# Patient Record
Sex: FEMALE | Race: WHITE | NOT HISPANIC OR LATINO | Employment: OTHER | URBAN - NONMETROPOLITAN AREA
[De-identification: names, ages, dates, MRNs, and addresses within clinical notes are randomized per-mention and may not be internally consistent; named-entity substitution may affect disease eponyms.]

---

## 2017-12-04 RX ORDER — RANITIDINE 150 MG/1
150 TABLET ORAL 2 TIMES DAILY
COMMUNITY
End: 2017-12-11 | Stop reason: HOSPADM

## 2017-12-05 ENCOUNTER — HOSPITAL ENCOUNTER (OUTPATIENT)
Dept: MRI IMAGING | Facility: HOSPITAL | Age: 77
Discharge: HOME | End: 2017-12-05
Admitting: INTERNAL MEDICINE

## 2017-12-05 DIAGNOSIS — R10.11 ABDOMINAL PAIN, RIGHT UPPER QUADRANT: ICD-10-CM

## 2017-12-05 PROCEDURE — 74181 MRI ABDOMEN W/O CONTRAST: CPT

## 2017-12-11 ENCOUNTER — HOSPITAL ENCOUNTER (OUTPATIENT)
Facility: HOSPITAL | Age: 77
Setting detail: HOSPITAL OUTPATIENT SURGERY
Discharge: HOME | End: 2017-12-11
Attending: INTERNAL MEDICINE | Admitting: INTERNAL MEDICINE

## 2017-12-11 ENCOUNTER — ANESTHESIA EVENT (OUTPATIENT)
Dept: GASTROENTEROLOGY | Facility: HOSPITAL | Age: 77
End: 2017-12-11

## 2017-12-11 ENCOUNTER — ANESTHESIA (OUTPATIENT)
Dept: GASTROENTEROLOGY | Facility: HOSPITAL | Age: 77
End: 2017-12-11

## 2017-12-11 VITALS
WEIGHT: 178.5 LBS | SYSTOLIC BLOOD PRESSURE: 195 MMHG | BODY MASS INDEX: 31.63 KG/M2 | RESPIRATION RATE: 18 BRPM | HEART RATE: 69 BPM | TEMPERATURE: 96.7 F | HEIGHT: 63 IN | OXYGEN SATURATION: 96 % | DIASTOLIC BLOOD PRESSURE: 78 MMHG

## 2017-12-11 DIAGNOSIS — K27.9: ICD-10-CM

## 2017-12-11 DIAGNOSIS — Z86.010 HISTORY OF COLONIC POLYPS: ICD-10-CM

## 2017-12-11 PROCEDURE — 88305 TISSUE EXAM BY PATHOLOGIST: CPT | Performed by: INTERNAL MEDICINE

## 2017-12-11 PROCEDURE — 25010000002 PROPOFOL 10 MG/ML EMULSION: Performed by: NURSE ANESTHETIST, CERTIFIED REGISTERED

## 2017-12-11 PROCEDURE — 25010000002 MIDAZOLAM PER 1 MG: Performed by: NURSE ANESTHETIST, CERTIFIED REGISTERED

## 2017-12-11 PROCEDURE — 88305 TISSUE EXAM BY PATHOLOGIST: CPT | Performed by: PATHOLOGY

## 2017-12-11 RX ORDER — PROPOFOL 10 MG/ML
VIAL (ML) INTRAVENOUS AS NEEDED
Status: DISCONTINUED | OUTPATIENT
Start: 2017-12-11 | End: 2017-12-11 | Stop reason: SURG

## 2017-12-11 RX ORDER — MIDAZOLAM HYDROCHLORIDE 1 MG/ML
INJECTION INTRAMUSCULAR; INTRAVENOUS AS NEEDED
Status: DISCONTINUED | OUTPATIENT
Start: 2017-12-11 | End: 2017-12-11 | Stop reason: SURG

## 2017-12-11 RX ORDER — LIDOCAINE HYDROCHLORIDE 10 MG/ML
INJECTION, SOLUTION INFILTRATION; PERINEURAL AS NEEDED
Status: DISCONTINUED | OUTPATIENT
Start: 2017-12-11 | End: 2017-12-11 | Stop reason: SURG

## 2017-12-11 RX ORDER — PROMETHAZINE HYDROCHLORIDE 25 MG/ML
12.5 INJECTION, SOLUTION INTRAMUSCULAR; INTRAVENOUS ONCE AS NEEDED
Status: DISCONTINUED | OUTPATIENT
Start: 2017-12-11 | End: 2017-12-11 | Stop reason: HOSPADM

## 2017-12-11 RX ORDER — PROMETHAZINE HYDROCHLORIDE 25 MG/1
25 SUPPOSITORY RECTAL ONCE AS NEEDED
Status: DISCONTINUED | OUTPATIENT
Start: 2017-12-11 | End: 2017-12-11 | Stop reason: HOSPADM

## 2017-12-11 RX ORDER — DEXTROSE AND SODIUM CHLORIDE 5; .45 G/100ML; G/100ML
20 INJECTION, SOLUTION INTRAVENOUS CONTINUOUS
Status: DISCONTINUED | OUTPATIENT
Start: 2017-12-11 | End: 2017-12-11 | Stop reason: HOSPADM

## 2017-12-11 RX ORDER — DEXAMETHASONE SODIUM PHOSPHATE 4 MG/ML
8 INJECTION, SOLUTION INTRA-ARTICULAR; INTRALESIONAL; INTRAMUSCULAR; INTRAVENOUS; SOFT TISSUE ONCE AS NEEDED
Status: DISCONTINUED | OUTPATIENT
Start: 2017-12-11 | End: 2017-12-11 | Stop reason: HOSPADM

## 2017-12-11 RX ORDER — ONDANSETRON 2 MG/ML
4 INJECTION INTRAMUSCULAR; INTRAVENOUS ONCE AS NEEDED
Status: DISCONTINUED | OUTPATIENT
Start: 2017-12-11 | End: 2017-12-11 | Stop reason: HOSPADM

## 2017-12-11 RX ORDER — PROMETHAZINE HYDROCHLORIDE 25 MG/1
25 TABLET ORAL ONCE AS NEEDED
Status: DISCONTINUED | OUTPATIENT
Start: 2017-12-11 | End: 2017-12-11 | Stop reason: HOSPADM

## 2017-12-11 RX ADMIN — MIDAZOLAM 2 MG: 1 INJECTION INTRAMUSCULAR; INTRAVENOUS at 16:45

## 2017-12-11 RX ADMIN — DEXTROSE AND SODIUM CHLORIDE 20 ML/HR: 5; 450 INJECTION, SOLUTION INTRAVENOUS at 16:09

## 2017-12-11 RX ADMIN — LIDOCAINE HYDROCHLORIDE 50 MG: 10 INJECTION, SOLUTION INFILTRATION; PERINEURAL at 16:45

## 2017-12-11 RX ADMIN — PROPOFOL 100 MG: 10 INJECTION, EMULSION INTRAVENOUS at 16:45

## 2017-12-11 RX ADMIN — PROPOFOL 100 MG: 10 INJECTION, EMULSION INTRAVENOUS at 16:50

## 2017-12-11 NOTE — H&P
Kylee Garcia DO,[facility]  Gastroenterology  Hepatology  Endoscopy  Board Certified in Internal Medicine and gastroenterology  [address], [address]  [place]. [address]  - [phone]   F - [phone]     GASTROENTEROLOGY HISTORY AND PHYSICAL  NOTE   KYLEE GARCIA DO.         SUBJECTIVE:   12/11/2017    Name: Hallie Milan  DOD: 1940        Chief Complaint:       Subjective : Upper abdominal pain with personal history of colon polyps and a family history of colon cancer    Patient is 77 y.o. female presents with for elective EGD and colonoscopy.      ROS/HISTORY/ CURRENT MEDICATIONS/OBJECTIVE/VS/PE:   Review of Systems:   Review of Systems    History:     Past Medical History:   Diagnosis Date   • Allergic rhinitis    • Benign paroxysmal positional vertigo    • Chronic laryngitis    • Ear problem     ear dysfunction   • GERD (gastroesophageal reflux disease)    • Kidney stone     Hx of   • Rapid heart beat    • Temporomandibular joint disorder    • Vertigo      Past Surgical History:   Procedure Laterality Date   • CHOLECYSTECTOMY     • MOUTH SURGERY      Due to staph infection in lower lip     Family History   Problem Relation Age of Onset   • Cancer Other    • Diabetes Other    • Heart disease Other    • Hypertension Other    • Stroke Other    • Thyroid disease Other    • Other Other      Colon Problems     Social History   Substance Use Topics   • Smoking status: Never Smoker   • Smokeless tobacco: Never Used   • Alcohol use No     Prescriptions Prior to Admission   Medication Sig Dispense Refill Last Dose   • albuterol (PROVENTIL HFA;VENTOLIN HFA) 108 (90 BASE) MCG/ACT inhaler Inhale 1 puff Every 4 (Four) Hours As Needed for wheezing.   Past Week at Unknown time   • B COMPLEX VITAMINS PO Take 1,000 mcg by mouth 1 (One) Time Per Week.   Past Week at Unknown time   • nebivolol (BYSTOLIC) 5 MG tablet Take 5 mg by mouth Daily.   12/10/2017 at Unknown time   • raNITIdine (ZANTAC) 150  MG tablet Take 150 mg by mouth 2 (Two) Times a Day.   12/10/2017 at Unknown time   • beclomethasone (QVAR) 40 MCG/ACT inhaler Inhale 2 puffs 2 (Two) Times a Day.   12/8/2017   • MAGNESIUM GLUCONATE PO Take 250 mg by mouth Daily.   12/8/2017   • meclizine (ANTIVERT) 25 MG tablet Take 12.5-25 mg by mouth 3 (Three) Times a Day As Needed for dizziness.   12/4/2017   • Omega-3 Fatty Acids (FISH OIL) 500 MG capsule Take 900 mg by mouth 2 (Two) Times a Day.   12/7/2017     Allergies:  Codeine; Minoxidil; Claritin-d 12 hour [loratadine-pseudoephedrine er]; and Prednisone    I have reviewed the patients medical history, surgical history and family history in the available medical record system.     Current Medications:     Current Facility-Administered Medications   Medication Dose Route Frequency Provider Last Rate Last Dose   • dextrose 5 % and sodium chloride 0.45 % infusion  20 mL/hr Intravenous Continuous Curtis Rick,  20 mL/hr at 12/11/17 1609 20 mL/hr at 12/11/17 1609       Objective     Physical Exam:   Temp:  [96.9 °F (36.1 °C)] 96.9 °F (36.1 °C)  Heart Rate:  [76] 76  Resp:  [19] 19    Physical Exam:  General Appearance:    Alert, cooperative, in no acute distress   Head:    Normocephalic, without obvious abnormality, atraumatic   Eyes:            Lids and lashes normal, conjunctivae and sclerae normal, no   icterus, no pallor, corneas clear, PERRLA   Ears:    Ears appear intact with no abnormalities noted   Throat:   No oral lesions, no thrush, oral mucosa moist   Neck:   No adenopathy, supple, trachea midline, no thyromegaly, no     carotid bruit, no JVD   Back:     No kyphosis present, no scoliosis present, no skin lesions,       erythema or scars, no tenderness to percussion or                   palpation,   range of motion normal   Lungs:     Clear to auscultation,respirations regular, even and                   unlabored    Heart:    Regular rhythm and normal rate, normal S1 and S2, no             murmur, no gallop, no rub, no click   Breast Exam:    Deferred   Abdomen:     Normal bowel sounds, no masses, no organomegaly, soft        non-tender, non-distended, no guarding, no rebound                 tenderness   Genitalia:    Deferred   Extremities:   Moves all extremities well, no edema, no cyanosis, no              redness   Pulses:   Pulses palpable and equal bilaterally   Skin:   No bleeding, bruising or rash   Lymph nodes:   No palpable adenopathy   Neurologic:   Cranial nerves 2 - 12 grossly intact, sensation intact, DTR        present and equal bilaterally      Results Review:     Lab Results   Component Value Date    WBC 10.1 (H) 10/10/2016    WBC 11.1 (H) 10/09/2016    HGB 12.1 10/10/2016    HGB 13.4 10/09/2016    HCT 36.2 10/10/2016    HCT 40.1 10/09/2016     10/10/2016     10/09/2016             No results found for: LIPASE  Lab Results   Component Value Date    INR 1.0 10/10/2016    INR 1.0 10/09/2016          Radiology Review:  Imaging Results (last 72 hours)     ** No results found for the last 72 hours. **           I reviewed the patient's new clinical results.  I reviewed the patient's new imaging results and agree with the interpretation.     ASSESSMENT/PLAN:   ASSESSMENT:   1.  Dyspepsia with no evidence of any improvement with typical medical therapy  2.  Family history of colon cancer  3.  Personal history of colon polyps    PLAN:   1.  Esophagogastroduodenoscopy with biopsies  2.  Colonoscopy    Risk and benefits associated with the procedure are reviewed with the patient.  The patient wishes to proceed      Curtis Rick DO  12/11/17  4:34 PM

## 2017-12-11 NOTE — PLAN OF CARE
Problem: Patient Care Overview (Adult)  Goal: Plan of Care Review    12/11/17 1705   Coping/Psychosocial Response Interventions   Plan Of Care Reviewed With patient   Patient Care Overview   Progress no change   Outcome Evaluation   Outcome Summary/Follow up Plan vss         Problem: GI Endoscopy (Adult)  Goal: Signs and Symptoms of Listed Potential Problems Will be Absent or Manageable (GI Endoscopy)    12/11/17 1705   GI Endoscopy   Problems Assessed (GI Endoscopy) all   Problems Present (GI Endoscopy) none

## 2017-12-11 NOTE — PLAN OF CARE
Problem: Patient Care Overview (Adult)  Goal: Plan of Care Review  Outcome: Outcome(s) achieved Date Met:  12/11/17 12/11/17 1718   Coping/Psychosocial Response Interventions   Plan Of Care Reviewed With patient   Patient Care Overview   Progress no change   Outcome Evaluation   Outcome Summary/Follow up Plan vss         Problem: GI Endoscopy (Adult)  Goal: Signs and Symptoms of Listed Potential Problems Will be Absent or Manageable (GI Endoscopy)  Outcome: Outcome(s) achieved Date Met:  12/11/17 12/11/17 1718   GI Endoscopy   Problems Assessed (GI Endoscopy) all   Problems Present (GI Endoscopy) none

## 2017-12-11 NOTE — ANESTHESIA POSTPROCEDURE EVALUATION
Patient: Hallie Milan    Procedure Summary     Date Anesthesia Start Anesthesia Stop Room / Location    12/11/17 3995 0086 [facility] ENDOSCOPY 2 / [facility] ENDOSCOPY       Procedure Diagnosis Surgeon Provider    ESOPHAGOGASTRODUODENOSCOPY (N/A Esophagus); COLONOSCOPY (N/A ) Active peptic ulcer; History of colonic polyps  (Active peptic ulcer [K27.9]; History of colonic polyps [Z86.010]) Curtis Rick, DO Terrell Boone CRNA          Anesthesia Type: MAC  Last vitals  BP       Temp   96.9 °F (36.1 °C) (12/11/17 1557)   Pulse   76 (12/11/17 1557)   Resp   19 (12/11/17 1557)     SpO2   98 % (12/11/17 1557)     Post Anesthesia Care and Evaluation    Patient location during evaluation: PACU  Patient participation: complete - patient participated  Level of consciousness: awake  Pain score: 1  Pain management: adequate  Airway patency: patent  Anesthetic complications: No anesthetic complications  PONV Status: none  Cardiovascular status: acceptable  Respiratory status: acceptable  Hydration status: acceptable

## 2017-12-11 NOTE — ANESTHESIA PREPROCEDURE EVALUATION
Anesthesia Evaluation     no history of anesthetic complications:  NPO Solid Status: > 8 hours  NPO Liquid Status: > 2 hours     Airway   Mallampati: II  TM distance: >3 FB  Neck ROM: full  Dental - normal exam     Comment: Lower partial left at home    Pulmonary - normal exam   (+) asthma,   Cardiovascular - normal exam    (+) dysrhythmias Tachycardia,   (-) hypertension    ROS comment: Elevated BP due to pain    Neuro/Psych  (+) dizziness/light headedness,    GI/Hepatic/Renal/Endo    (+)  GERD,     Musculoskeletal     Abdominal    Substance History      OB/GYN          Other                                        Anesthesia Plan    ASA 2     MAC     intravenous induction   Anesthetic plan and risks discussed with patient.

## 2017-12-13 LAB
LAB AP CASE REPORT: NORMAL
Lab: NORMAL
PATH REPORT.FINAL DX SPEC: NORMAL
PATH REPORT.GROSS SPEC: NORMAL

## 2018-01-29 ENCOUNTER — OFFICE VISIT (OUTPATIENT)
Dept: CARDIAC SURGERY | Facility: CLINIC | Age: 78
End: 2018-01-29

## 2018-01-29 VITALS
OXYGEN SATURATION: 96 % | SYSTOLIC BLOOD PRESSURE: 128 MMHG | HEIGHT: 63 IN | WEIGHT: 177 LBS | BODY MASS INDEX: 31.36 KG/M2 | HEART RATE: 75 BPM | DIASTOLIC BLOOD PRESSURE: 72 MMHG

## 2018-01-29 DIAGNOSIS — K27.9 PUD (PEPTIC ULCER DISEASE): ICD-10-CM

## 2018-01-29 DIAGNOSIS — H81.10 BENIGN PAROXYSMAL POSITIONAL VERTIGO, UNSPECIFIED LATERALITY: ICD-10-CM

## 2018-01-29 DIAGNOSIS — K21.9 GASTROESOPHAGEAL REFLUX DISEASE WITHOUT ESOPHAGITIS: ICD-10-CM

## 2018-01-29 DIAGNOSIS — I10 BENIGN ESSENTIAL HTN: ICD-10-CM

## 2018-01-29 DIAGNOSIS — E66.09 CLASS 1 OBESITY DUE TO EXCESS CALORIES WITH SERIOUS COMORBIDITY AND BODY MASS INDEX (BMI) OF 31.0 TO 31.9 IN ADULT: ICD-10-CM

## 2018-01-29 DIAGNOSIS — Q79.0 MORGAGNI HERNIA: Primary | ICD-10-CM

## 2018-01-29 PROCEDURE — 99204 OFFICE O/P NEW MOD 45 MIN: CPT | Performed by: THORACIC SURGERY (CARDIOTHORACIC VASCULAR SURGERY)

## 2018-01-29 RX ORDER — SUCRALFATE 1 G/1
1 TABLET ORAL 4 TIMES DAILY
COMMUNITY
End: 2018-02-08 | Stop reason: HOSPADM

## 2018-01-29 RX ORDER — PANTOPRAZOLE SODIUM 40 MG/1
40 TABLET, DELAYED RELEASE ORAL
COMMUNITY
End: 2022-03-25

## 2018-02-05 PROBLEM — K27.9 PUD (PEPTIC ULCER DISEASE): Status: ACTIVE | Noted: 2018-02-05

## 2018-02-05 PROBLEM — K21.9 GERD (GASTROESOPHAGEAL REFLUX DISEASE): Status: ACTIVE | Noted: 2018-02-05

## 2018-02-05 PROBLEM — H81.10 BENIGN PAROXYSMAL POSITIONAL VERTIGO: Status: ACTIVE | Noted: 2018-02-05

## 2018-02-05 PROBLEM — E66.09 CLASS 1 OBESITY DUE TO EXCESS CALORIES WITH SERIOUS COMORBIDITY AND BODY MASS INDEX (BMI) OF 31.0 TO 31.9 IN ADULT: Status: ACTIVE | Noted: 2018-02-05

## 2018-02-05 PROBLEM — Q79.0 MORGAGNI HERNIA: Status: ACTIVE | Noted: 2018-02-05

## 2018-02-05 PROBLEM — I10 BENIGN ESSENTIAL HTN: Status: ACTIVE | Noted: 2018-02-05

## 2018-02-05 NOTE — PROGRESS NOTES
1/29/2018    Hallie Milan  1940    Chief Complaint:    Chief Complaint   Patient presents with   • diaphragmatic hernia       HPI:      PCP:  Manolo Galarza MD  Cardiology:  Dr Matias  GI:  Dr Rick     77 y.o. female with Peptic Ulcer disease(uncontrolled, increased risk ulcer complications), Morgagni diaphragmatic hernia(new, uncontrolled), HTN(stable, increased risk stroke), Obesity(uncontrolled, BMI 32, increased risk cardiovascular events), .  never smoked.  Moderate stomach pains x years.  Currently being treated for active duodenal ulcer disease.  No TIA stroke amaurosis.  No MI claudication. No other associated signs, symptoms or modifying factors.    10/2016 CT Abdomen Pelvis:  Distended gallbladder, diverticulosis.  Small Morgagni hernia containing fat.  12/2017 EGD:  Gastritis, small duodenal ulcers x 10, max 6mm.  12/2017 Colonoscopy:  Diverticulosis, internal hemorrhoids.  12/2017 MRI Abdomen MRCP:  No biliary, pancreatic ductal dilation.  No choledocholithiasis.  Small Morgagni hernia containing fat.    10/2016 ECG:  NSR 66, QTc 442    The following portions of the patient's history were reviewed and updated as appropriate: allergies, current medications, past family history, past medical history, past social history, past surgical history and problem list.  Recent images independently reviewed.  Available laboratory values reviewed.    PMH:  Past Medical History:   Diagnosis Date   • Allergic rhinitis    • Asthma    • Benign paroxysmal positional vertigo    • Chronic laryngitis    • Ear problem     ear dysfunction   • GERD (gastroesophageal reflux disease)    • Kidney stone     Hx of   • Morgagni hernia    • Rapid heart beat    • Temporomandibular joint disorder    • Vertigo        ALLERGIES:  Allergies   Allergen Reactions   • Codeine Other (See Comments)     oversedation   • Minoxidil      Made her heart feel funny    • Claritin-D 12 Hour [Loratadine-Pseudoephedrine Er] Anxiety and  Palpitations   • Prednisone Anxiety and Palpitations         MEDICATIONS:    Current Outpatient Prescriptions:   •  albuterol (PROVENTIL HFA;VENTOLIN HFA) 108 (90 BASE) MCG/ACT inhaler, Inhale 1 puff Every 4 (Four) Hours As Needed for wheezing., Disp: , Rfl:   •  B COMPLEX VITAMINS PO, Take 1,000 mcg by mouth 1 (One) Time Per Week., Disp: , Rfl:   •  beclomethasone (QVAR) 40 MCG/ACT inhaler, Inhale 2 puffs 2 (Two) Times a Day., Disp: , Rfl:   •  MAGNESIUM GLUCONATE PO, Take 250 mg by mouth Daily., Disp: , Rfl:   •  meclizine (ANTIVERT) 25 MG tablet, Take 12.5-25 mg by mouth 3 (Three) Times a Day As Needed for dizziness., Disp: , Rfl:   •  nebivolol (BYSTOLIC) 5 MG tablet, Take 5 mg by mouth Daily., Disp: , Rfl:   •  Omega-3 Fatty Acids (FISH OIL) 500 MG capsule, Take 900 mg by mouth 2 (Two) Times a Day., Disp: , Rfl:   •  pantoprazole (PROTONIX) 40 MG EC tablet, Take 40 mg by mouth 2 (Two) Times a Day., Disp: , Rfl:   •  sucralfate (CARAFATE) 1 g tablet, Take 1 g by mouth 4 (Four) Times a Day., Disp: , Rfl:     Review of Systems   Review of Systems   Constitution: Negative for malaise/fatigue, night sweats and weight loss.   HENT: Positive for hoarse voice and sore throat. Negative for hearing loss and stridor.    Eyes: Negative for vision loss in left eye, vision loss in right eye and visual disturbance.   Cardiovascular: Negative for chest pain, leg swelling and palpitations.   Respiratory: Positive for wheezing. Negative for cough, hemoptysis and shortness of breath.    Hematologic/Lymphatic: Negative for adenopathy and bleeding problem. Does not bruise/bleed easily.   Skin: Negative for color change, poor wound healing and rash.   Musculoskeletal: Negative for arthritis, back pain, muscle weakness and neck pain.   Gastrointestinal: Positive for abdominal pain and heartburn. Negative for dysphagia.   Neurological: Positive for dizziness. Negative for numbness and seizures.   Psychiatric/Behavioral: Negative for  altered mental status, depression and memory loss. The patient is not nervous/anxious.        Physical Exam   Physical Exam   Constitutional: She is oriented to person, place, and time. She is active and cooperative. She does not appear ill. No distress.   HENT:   Head: Atraumatic.   Right Ear: Hearing normal.   Left Ear: Hearing normal.   Nose: No nasal deformity. No epistaxis.   Mouth/Throat: She does not have dentures. Normal dentition.   Eyes: Conjunctivae and lids are normal. Right pupil is round and reactive. Left pupil is round and reactive.   Neck: No JVD present. Carotid bruit is not present. No tracheal deviation present. No thyroid mass and no thyromegaly present.   Cardiovascular: Normal rate and regular rhythm.    No murmur heard.  Pulses:       Carotid pulses are 2+ on the right side, and 2+ on the left side.       Radial pulses are 2+ on the right side, and 2+ on the left side.        Dorsalis pedis pulses are 2+ on the right side, and 2+ on the left side.   Pulmonary/Chest: Effort normal and breath sounds normal.   Abdominal: Soft. She exhibits no distension and no mass. There is no splenomegaly or hepatomegaly. There is no tenderness.   Musculoskeletal: She exhibits no deformity.   Gait normal.    Lymphadenopathy:     She has no cervical adenopathy.        Right: No supraclavicular adenopathy present.        Left: No supraclavicular adenopathy present.   Neurological: She is alert and oriented to person, place, and time. She has normal strength.   Skin: Skin is warm and dry. No cyanosis or erythema. No pallor.   No venous staining   Psychiatric: She has a normal mood and affect. Her speech is normal. Judgment and thought content normal.     Results for HALLIE HENDERSON (MRN [id number]) as of 2/5/2018 09:07   Ref. Range 10/10/2016 02:30   Creatinine Latest Ref Range: 0.5 - 1.0 mg/dl 0.7   BUN Latest Ref Range: 7 - 21 mg/dl 17     ASSESSMENT:  Hallie was seen today for diaphragmatic  hernia.    Diagnoses and all orders for this visit:    Morgagni hernia    Benign paroxysmal positional vertigo, unspecified laterality    Gastroesophageal reflux disease without esophagitis    Class 1 obesity due to excess calories with serious comorbidity and body mass index (BMI) of 31.0 to 31.9 in adult    Benign essential HTN    PUD (peptic ulcer disease)    PLAN:  Detailed discussion with Hallie Milan regarding situation and options.  Congenital defect.  Foramen Morgagni hernia containing small amount fat/omentum, doubt symptomatic.  No change in appearance from 2016 imaging.  Current treatment to active duodenal ulcer disease.  Can re-evaluate if symptoms persist after ulcer disease resolved.  Multiple risk factors with severe comorbidities.  No intervention indicated at this time.  Risks, benefits discussed.  Understands and wishes to proceed with plan.    Return as needed.  Recommended regular physical activity, progressive walking program.  Continue current medications as directed.  Will Obtain relevant old records.    Thank you for the opportunity to participate in this patient's care.    Copy to primary care provider.    EMR Dragon/Transcription disclaimer:   Much of this encounter note is an electronic transcription/translation of spoken language to printed text. The electronic translation of spoken language may permit erroneous, or at times, nonsensical words or phrases to be inadvertently transcribed; Although I have reviewed the note for such errors, some may still exist.

## 2018-02-08 ENCOUNTER — ANESTHESIA (OUTPATIENT)
Dept: GASTROENTEROLOGY | Facility: HOSPITAL | Age: 78
End: 2018-02-08

## 2018-02-08 ENCOUNTER — HOSPITAL ENCOUNTER (OUTPATIENT)
Facility: HOSPITAL | Age: 78
Setting detail: HOSPITAL OUTPATIENT SURGERY
Discharge: HOME | End: 2018-02-08
Attending: INTERNAL MEDICINE | Admitting: INTERNAL MEDICINE

## 2018-02-08 ENCOUNTER — ANESTHESIA EVENT (OUTPATIENT)
Dept: GASTROENTEROLOGY | Facility: HOSPITAL | Age: 78
End: 2018-02-08

## 2018-02-08 VITALS
RESPIRATION RATE: 16 BRPM | OXYGEN SATURATION: 96 % | HEART RATE: 66 BPM | WEIGHT: 174.38 LBS | SYSTOLIC BLOOD PRESSURE: 177 MMHG | DIASTOLIC BLOOD PRESSURE: 59 MMHG | BODY MASS INDEX: 32.09 KG/M2 | HEIGHT: 62 IN | TEMPERATURE: 97.2 F

## 2018-02-08 DIAGNOSIS — K27.9 PEPTIC ULCER SYNDROME: ICD-10-CM

## 2018-02-08 PROCEDURE — 88305 TISSUE EXAM BY PATHOLOGIST: CPT | Performed by: INTERNAL MEDICINE

## 2018-02-08 PROCEDURE — 88305 TISSUE EXAM BY PATHOLOGIST: CPT | Performed by: PATHOLOGY

## 2018-02-08 PROCEDURE — 25010000002 PROPOFOL 10 MG/ML EMULSION: Performed by: NURSE ANESTHETIST, CERTIFIED REGISTERED

## 2018-02-08 RX ORDER — PROPOFOL 10 MG/ML
VIAL (ML) INTRAVENOUS AS NEEDED
Status: DISCONTINUED | OUTPATIENT
Start: 2018-02-08 | End: 2018-02-08 | Stop reason: SURG

## 2018-02-08 RX ORDER — DEXTROSE AND SODIUM CHLORIDE 5; .45 G/100ML; G/100ML
20 INJECTION, SOLUTION INTRAVENOUS CONTINUOUS
Status: DISCONTINUED | OUTPATIENT
Start: 2018-02-08 | End: 2018-02-08 | Stop reason: HOSPADM

## 2018-02-08 RX ORDER — LIDOCAINE HYDROCHLORIDE 10 MG/ML
INJECTION, SOLUTION INFILTRATION; PERINEURAL AS NEEDED
Status: DISCONTINUED | OUTPATIENT
Start: 2018-02-08 | End: 2018-02-08 | Stop reason: SURG

## 2018-02-08 RX ADMIN — PROPOFOL 70 MG: 10 INJECTION, EMULSION INTRAVENOUS at 08:44

## 2018-02-08 RX ADMIN — PROPOFOL 20 MG: 10 INJECTION, EMULSION INTRAVENOUS at 08:46

## 2018-02-08 RX ADMIN — LIDOCAINE HYDROCHLORIDE 80 MG: 10 INJECTION, SOLUTION INFILTRATION; PERINEURAL at 08:44

## 2018-02-08 RX ADMIN — DEXTROSE AND SODIUM CHLORIDE 20 ML/HR: 5; 450 INJECTION, SOLUTION INTRAVENOUS at 08:20

## 2018-02-08 NOTE — ANESTHESIA POSTPROCEDURE EVALUATION
Patient: Hallie Milan    Procedure Summary     Date Anesthesia Start Anesthesia Stop Room / Location    02/08/18 0839 0848 [facility] ENDOSCOPY 2 / [facility] ENDOSCOPY       Procedure Diagnosis Surgeon Provider    ESOPHAGOGASTRODUODENOSCOPY (N/A Esophagus) Peptic ulcer syndrome  (Peptic ulcer syndrome [K27.9]) Curtis Rick, DO Elizabeth Parrish CRNA          Anesthesia Type: MAC  Last vitals  BP   (!) 190/89 (02/08/18 0802)   Temp   96.8 °F (36 °C) (02/08/18 0802)   Pulse   61 (02/08/18 0802)   Resp   16 (02/08/18 0802)     SpO2   99 % (02/08/18 0802)     Post Anesthesia Care and Evaluation    Patient location during evaluation: bedside  Patient participation: complete - patient participated  Level of consciousness: awake and awake and alert  Pain score: 0  Pain management: satisfactory to patient  Airway patency: patent  Anesthetic complications: No anesthetic complications  PONV Status: none  Cardiovascular status: acceptable and stable  Respiratory status: acceptable, room air, unassisted and spontaneous ventilation  Hydration status: acceptable

## 2018-02-08 NOTE — PLAN OF CARE
Problem: Patient Care Overview (Adult)  Goal: Plan of Care Review   02/08/18 0850   Coping/Psychosocial Response Interventions   Plan Of Care Reviewed With patient   Patient Care Overview   Progress no change   Outcome Evaluation   Outcome Summary/Follow up Plan vss       Problem: GI Endoscopy (Adult)  Goal: Signs and Symptoms of Listed Potential Problems Will be Absent or Manageable (GI Endoscopy)   02/08/18 0850   GI Endoscopy   Problems Assessed (GI Endoscopy) all   Problems Present (GI Endoscopy) none

## 2018-02-08 NOTE — ADDENDUM NOTE
Addendum  created 02/08/18 0908 by Elizabeth Parrish CRNA    Anesthesia Intra Flowsheets edited

## 2018-02-08 NOTE — PLAN OF CARE
Problem: Patient Care Overview (Adult)  Goal: Plan of Care Review  Outcome: Outcome(s) achieved Date Met: 02/08/18  vss

## 2018-02-08 NOTE — H&P
Kylee Garcia DO,[facility]  Gastroenterology  Hepatology  Endoscopy  Board Certified in Internal Medicine and gastroenterology  [address], [address]  [place]. [address]  [phone]   F [phone]     GASTROENTEROLOGY HISTORY AND PHYSICAL  NOTE   KYLEE GARCIA DO.         SUBJECTIVE:   2/8/2018    Name: Hallie Milan  DOD: 1940        Chief Complaint:       Subjective : Dyspepsia with a personal history of duodenal ulcer.  Ensure that there is healing of the duodenal ulcer    Patient is 77 y.o. female presents with desire for elective EGD.      ROS/HISTORY/ CURRENT MEDICATIONS/OBJECTIVE/VS/PE:   Review of Systems:   Review of Systems    History:     Past Medical History:   Diagnosis Date   • Allergic rhinitis    • Asthma    • Benign paroxysmal positional vertigo    • Chronic laryngitis    • Ear problem     ear dysfunction   • GERD (gastroesophageal reflux disease)    • Kidney stone     Hx of   • Morgagni hernia    • Rapid heart beat    • Temporomandibular joint disorder    • Vertigo      Past Surgical History:   Procedure Laterality Date   • CHOLECYSTECTOMY     • COLONOSCOPY N/A 12/11/2017    Procedure: COLONOSCOPY;  Surgeon: Kylee Garcia DO;  Location: [facility] ENDOSCOPY;  Service:    • ENDOSCOPY N/A 12/11/2017    Procedure: ESOPHAGOGASTRODUODENOSCOPY;  Surgeon: Kylee Garcia DO;  Location: [facility] ENDOSCOPY;  Service:    • MOUTH SURGERY      Due to staph infection in lower lip     Family History   Problem Relation Age of Onset   • Cancer Other    • Diabetes Other    • Heart disease Other    • Hypertension Other    • Stroke Other    • Thyroid disease Other    • Other Other      Colon Problems     Social History   Substance Use Topics   • Smoking status: Never Smoker   • Smokeless tobacco: Never Used   • Alcohol use No     Prescriptions Prior to Admission   Medication Sig Dispense Refill Last Dose   • B COMPLEX VITAMINS PO Take 1,000 mcg by mouth 1 (One) Time Per Week.    2/7/2018 at Unknown time   • MAGNESIUM GLUCONATE PO Take 250 mg by mouth Daily.   2/7/2018 at Unknown time   • nebivolol (BYSTOLIC) 5 MG tablet Take 5 mg by mouth Daily.   2/7/2018 at Unknown time   • pantoprazole (PROTONIX) 40 MG EC tablet Take 40 mg by mouth 2 (Two) Times a Day.   2/8/2018 at Unknown time   • sucralfate (CARAFATE) 1 g tablet Take 1 g by mouth 4 (Four) Times a Day.   2/7/2018 at Unknown time   • albuterol (PROVENTIL HFA;VENTOLIN HFA) 108 (90 BASE) MCG/ACT inhaler Inhale 1 puff Every 4 (Four) Hours As Needed for wheezing.   2/4/2018   • beclomethasone (QVAR) 40 MCG/ACT inhaler Inhale 2 puffs 2 (Two) Times a Day.   More than a month at Unknown time   • meclizine (ANTIVERT) 25 MG tablet Take 12.5-25 mg by mouth 3 (Three) Times a Day As Needed for dizziness.   1/25/2018   • Omega-3 Fatty Acids (FISH OIL) 500 MG capsule Take 900 mg by mouth 2 (Two) Times a Day.   2/5/2018     Allergies:  Codeine; Minoxidil; Claritin-d 12 hour [loratadine-pseudoephedrine er]; and Prednisone    I have reviewed the patients medical history, surgical history and family history in the available medical record system.     Current Medications:     Current Facility-Administered Medications   Medication Dose Route Frequency Provider Last Rate Last Dose   • dextrose 5 % and sodium chloride 0.45 % infusion  20 mL/hr Intravenous Continuous Curtis Rick DO 20 mL/hr at 02/08/18 0820 20 mL/hr at 02/08/18 0820       Objective     Physical Exam:   Temp:  [96.8 °F (36 °C)] 96.8 °F (36 °C)  Heart Rate:  [61] 61  Resp:  [16] 16  BP: (190)/(89) 190/89    Physical Exam:  General Appearance:    Alert, cooperative, in no acute distress   Head:    Normocephalic, without obvious abnormality, atraumatic   Eyes:            Lids and lashes normal, conjunctivae and sclerae normal, no   icterus, no pallor, corneas clear, PERRLA   Ears:    Ears appear intact with no abnormalities noted   Throat:   No oral lesions, no thrush, oral mucosa moist    Neck:   No adenopathy, supple, trachea midline, no thyromegaly, no     carotid bruit, no JVD   Back:     No kyphosis present, no scoliosis present, no skin lesions,       erythema or scars, no tenderness to percussion or                   palpation,   range of motion normal   Lungs:     Clear to auscultation,respirations regular, even and                   unlabored    Heart:    Regular rhythm and normal rate, normal S1 and S2, no            murmur, no gallop, no rub, no click   Breast Exam:    Deferred   Abdomen:     Normal bowel sounds, no masses, no organomegaly, soft        non-tender, non-distended, no guarding, no rebound                 tenderness   Genitalia:    Deferred   Extremities:   Moves all extremities well, no edema, no cyanosis, no              redness   Pulses:   Pulses palpable and equal bilaterally   Skin:   No bleeding, bruising or rash   Lymph nodes:   No palpable adenopathy   Neurologic:   Cranial nerves 2 - 12 grossly intact, sensation intact, DTR        present and equal bilaterally      Results Review:     Lab Results   Component Value Date    WBC 10.1 (H) 10/10/2016    WBC 11.1 (H) 10/09/2016    HGB 12.1 10/10/2016    HGB 13.4 10/09/2016    HCT 36.2 10/10/2016    HCT 40.1 10/09/2016     10/10/2016     10/09/2016             No results found for: LIPASE  Lab Results   Component Value Date    INR 1.0 10/10/2016    INR 1.0 10/09/2016          Radiology Review:  Imaging Results (last 72 hours)     ** No results found for the last 72 hours. **           I reviewed the patient's new clinical results.  I reviewed the patient's new imaging results and agree with the interpretation.     ASSESSMENT/PLAN:   ASSESSMENT:   1.  Duodenal ulcer    PLAN:   1.  Esophagogastroduodenoscopy with biopsies and possible cultures    Risk and benefits associated with procedure are reviewed with the patient.  The patient wished to proceed      Curtis Rick DO  02/08/18  8:32 AM

## 2018-02-08 NOTE — PLAN OF CARE
Problem: GI Endoscopy (Adult)  Goal: Signs and Symptoms of Listed Potential Problems Will be Absent or Manageable (GI Endoscopy)  Outcome: Outcome(s) achieved Date Met: 02/08/18

## 2018-02-08 NOTE — ANESTHESIA PREPROCEDURE EVALUATION
Anesthesia Evaluation     Patient summary reviewed and Nursing notes reviewed   NPO Solid Status: > 8 hours  NPO Liquid Status: > 2 hours           Airway   Mallampati: II  TM distance: >3 FB  Neck ROM: full  no difficulty expected  Dental    (+) upper dentures    Comment: Upper partial    Pulmonary - normal exam   (+) asthma,   Cardiovascular - normal exam    (+) hypertension,       Neuro/Psych  (+) dizziness/light headedness (begin paroxysmal positional vertigo),     GI/Hepatic/Renal/Endo    (+) obesity,  GERD, PUD, renal disease stones,     Musculoskeletal (-) negative ROS    Abdominal  - normal exam   Substance History - negative use     OB/GYN negative ob/gyn ROS         Other - negative ROS                       Anesthesia Plan    ASA 3     MAC     intravenous induction   Anesthetic plan and risks discussed with patient.

## 2018-06-05 ENCOUNTER — APPOINTMENT (OUTPATIENT)
Dept: GENERAL RADIOLOGY | Facility: HOSPITAL | Age: 78
End: 2018-06-05

## 2018-06-05 ENCOUNTER — HOSPITAL ENCOUNTER (EMERGENCY)
Facility: HOSPITAL | Age: 78
Discharge: HOME | End: 2018-06-05
Attending: EMERGENCY MEDICINE | Admitting: EMERGENCY MEDICINE

## 2018-06-05 ENCOUNTER — APPOINTMENT (OUTPATIENT)
Dept: CT IMAGING | Facility: HOSPITAL | Age: 78
End: 2018-06-05

## 2018-06-05 VITALS
BODY MASS INDEX: 31.01 KG/M2 | RESPIRATION RATE: 18 BRPM | HEART RATE: 59 BPM | WEIGHT: 175 LBS | DIASTOLIC BLOOD PRESSURE: 74 MMHG | TEMPERATURE: 97.4 F | OXYGEN SATURATION: 99 % | SYSTOLIC BLOOD PRESSURE: 177 MMHG | HEIGHT: 63 IN
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
## 2018-06-05 DIAGNOSIS — S63.501A SPRAIN OF RIGHT WRIST, INITIAL ENCOUNTER: ICD-10-CM

## 2018-06-05 DIAGNOSIS — R79.89 ELEVATED LFTS: ICD-10-CM

## 2018-06-05 LAB
ALBUMIN SERPL-MCNC: 4.2 G/DL (ref 3.4–4.8)
ALBUMIN/GLOB SERPL: 1.3 G/DL (ref 1.1–1.8)
ALP SERPL-CCNC: 67 U/L (ref 38–126)
ALT SERPL W P-5'-P-CCNC: 53 U/L (ref 9–52)
ANION GAP SERPL CALCULATED.3IONS-SCNC: 13 MMOL/L (ref 5–15)
AST SERPL-CCNC: 58 U/L (ref 14–36)
BACTERIA UR QL AUTO: ABNORMAL /HPF
BASOPHILS # BLD AUTO: 0.02 10*3/MM3 (ref 0–0.2)
BASOPHILS NFR BLD AUTO: 0.2 % (ref 0–2)
BILIRUB SERPL-MCNC: 0.4 MG/DL (ref 0.2–1.3)
BILIRUB UR QL STRIP: NEGATIVE
BUN BLD-MCNC: 19 MG/DL (ref 7–21)
BUN/CREAT SERPL: 21.1 (ref 7–25)
CALCIUM SPEC-SCNC: 9.2 MG/DL (ref 8.4–10.2)
CHLORIDE SERPL-SCNC: 104 MMOL/L (ref 95–110)
CLARITY UR: CLEAR
CO2 SERPL-SCNC: 23 MMOL/L (ref 22–31)
COLOR UR: YELLOW
CREAT BLD-MCNC: 0.9 MG/DL (ref 0.5–1)
DEPRECATED RDW RBC AUTO: 45.5 FL (ref 36.4–46.3)
EOSINOPHIL # BLD AUTO: 0.11 10*3/MM3 (ref 0–0.7)
EOSINOPHIL NFR BLD AUTO: 1.3 % (ref 0–7)
ERYTHROCYTE [DISTWIDTH] IN BLOOD BY AUTOMATED COUNT: 13.6 % (ref 11.5–14.5)
GFR SERPL CREATININE-BSD FRML MDRD: 61 ML/MIN/1.73 (ref 39–90)
GLOBULIN UR ELPH-MCNC: 3.3 GM/DL (ref 2.3–3.5)
GLUCOSE BLD-MCNC: 97 MG/DL (ref 60–100)
GLUCOSE UR STRIP-MCNC: NEGATIVE MG/DL
HCT VFR BLD AUTO: 39.6 % (ref 35–45)
HGB BLD-MCNC: 13 G/DL (ref 12–15.5)
HGB UR QL STRIP.AUTO: ABNORMAL
HOLD SPECIMEN: NORMAL
HOLD SPECIMEN: NORMAL
HYALINE CASTS UR QL AUTO: ABNORMAL /LPF
IMM GRANULOCYTES # BLD: 0.02 10*3/MM3 (ref 0–0.02)
IMM GRANULOCYTES NFR BLD: 0.2 % (ref 0–0.5)
KETONES UR QL STRIP: NEGATIVE
LEUKOCYTE ESTERASE UR QL STRIP.AUTO: ABNORMAL
LIPASE SERPL-CCNC: 65 U/L (ref 23–300)
LYMPHOCYTES # BLD AUTO: 1.73 10*3/MM3 (ref 0.6–4.2)
LYMPHOCYTES NFR BLD AUTO: 20.9 % (ref 10–50)
MCH RBC QN AUTO: 30.2 PG (ref 26.5–34)
MCHC RBC AUTO-ENTMCNC: 32.8 G/DL (ref 31.4–36)
MCV RBC AUTO: 92.1 FL (ref 80–98)
MONOCYTES # BLD AUTO: 0.83 10*3/MM3 (ref 0–0.9)
MONOCYTES NFR BLD AUTO: 10 % (ref 0–12)
NEUTROPHILS # BLD AUTO: 5.57 10*3/MM3 (ref 2–8.6)
NEUTROPHILS NFR BLD AUTO: 67.4 % (ref 37–80)
NITRITE UR QL STRIP: NEGATIVE
PH UR STRIP.AUTO: 7 [PH] (ref 5–9)
PLATELET # BLD AUTO: 228 10*3/MM3 (ref 150–450)
PMV BLD AUTO: 10.4 FL (ref 8–12)
POTASSIUM BLD-SCNC: 4 MMOL/L (ref 3.5–5.1)
PROT SERPL-MCNC: 7.5 G/DL (ref 6.3–8.6)
PROT UR QL STRIP: NEGATIVE
RBC # BLD AUTO: 4.3 10*6/MM3 (ref 3.77–5.16)
RBC # UR: ABNORMAL /HPF
REF LAB TEST METHOD: ABNORMAL
SODIUM BLD-SCNC: 140 MMOL/L (ref 137–145)
SP GR UR STRIP: 1.01 (ref 1–1.03)
SQUAMOUS #/AREA URNS HPF: ABNORMAL /HPF
UROBILINOGEN UR QL STRIP: ABNORMAL
WBC NRBC COR # BLD: 8.28 10*3/MM3 (ref 3.2–9.8)
WBC UR QL AUTO: ABNORMAL /HPF
WHOLE BLOOD HOLD SPECIMEN: NORMAL
WHOLE BLOOD HOLD SPECIMEN: NORMAL

## 2018-06-05 PROCEDURE — 80053 COMPREHEN METABOLIC PANEL: CPT | Performed by: EMERGENCY MEDICINE

## 2018-06-05 PROCEDURE — 85025 COMPLETE CBC W/AUTO DIFF WBC: CPT | Performed by: EMERGENCY MEDICINE

## 2018-06-05 PROCEDURE — 73130 X-RAY EXAM OF HAND: CPT

## 2018-06-05 PROCEDURE — 25010000002 IOPAMIDOL 61 % SOLUTION: Performed by: EMERGENCY MEDICINE

## 2018-06-05 PROCEDURE — 83690 ASSAY OF LIPASE: CPT | Performed by: EMERGENCY MEDICINE

## 2018-06-05 PROCEDURE — 81001 URINALYSIS AUTO W/SCOPE: CPT | Performed by: EMERGENCY MEDICINE

## 2018-06-05 PROCEDURE — 73110 X-RAY EXAM OF WRIST: CPT

## 2018-06-05 PROCEDURE — 74177 CT ABD & PELVIS W/CONTRAST: CPT

## 2018-06-05 PROCEDURE — 71046 X-RAY EXAM CHEST 2 VIEWS: CPT

## 2018-06-05 PROCEDURE — 99284 EMERGENCY DEPT VISIT MOD MDM: CPT

## 2018-06-05 RX ORDER — ACETAMINOPHEN 325 MG/1
650 TABLET ORAL ONCE
Status: COMPLETED | OUTPATIENT
Start: 2018-06-05 | End: 2018-06-05

## 2018-06-05 RX ORDER — SODIUM CHLORIDE 0.9 % (FLUSH) 0.9 %
10 SYRINGE (ML) INJECTION AS NEEDED
Status: DISCONTINUED | OUTPATIENT
Start: 2018-06-05 | End: 2018-06-05 | Stop reason: HOSPADM

## 2018-06-05 RX ADMIN — ACETAMINOPHEN 325 MG: 325 TABLET ORAL at 14:53

## 2018-06-05 RX ADMIN — IOPAMIDOL 80 ML: 612 INJECTION, SOLUTION INTRAVENOUS at 14:08

## 2018-06-05 NOTE — ED PROVIDER NOTES
Subjective   79yo female pmh significant htn/pud/gerd/bppv/morgagni hernia presents ED c/o trip et fall from standing position striking lower chest/upper abdomen on ground.  ROS neg loc/headache/neck pain/back pain/soa/n/v/d.  ROS (+) right wrist/hand pain.        History provided by:  Patient  Fall   Mechanism of injury: fall    Incident location:  Home  Fall:     Fall occurred:  Standing    Impact surface:  [place]  Associated symptoms: abdominal pain    Associated symptoms: no nausea and no vomiting        Review of Systems   Constitutional: Negative.    HENT: Negative.    Eyes: Negative.    Respiratory: Negative.    Cardiovascular: Negative.    Gastrointestinal: Positive for abdominal pain. Negative for nausea and vomiting.   Endocrine: Negative.    Musculoskeletal: Negative.    Skin: Negative.    All other systems reviewed and are negative.      Past Medical History:   Diagnosis Date   • Allergic rhinitis    • Asthma    • Benign paroxysmal positional vertigo    • Chronic laryngitis    • Ear problem     ear dysfunction   • GERD (gastroesophageal reflux disease)    • Kidney stone     Hx of   • Morgagni hernia    • Rapid heart beat    • Temporomandibular joint disorder    • Ulcer    • Vertigo        Allergies   Allergen Reactions   • Codeine Other (See Comments)     oversedation   • Minoxidil      Made her heart feel funny    • Claritin-D 12 Hour [Loratadine-Pseudoephedrine Er] Anxiety and Palpitations   • Prednisone Anxiety and Palpitations       Past Surgical History:   Procedure Laterality Date   • CHOLECYSTECTOMY     • COLONOSCOPY N/A 12/11/2017    Procedure: COLONOSCOPY;  Surgeon: Curtis Rick DO;  Location: [facility] ENDOSCOPY;  Service:    • ENDOSCOPY N/A 12/11/2017    Procedure: ESOPHAGOGASTRODUODENOSCOPY;  Surgeon: Curtis Rick DO;  Location: [facility] ENDOSCOPY;  Service:    • ENDOSCOPY N/A 2/8/2018    Procedure: ESOPHAGOGASTRODUODENOSCOPY;  Surgeon: Curtis Rick DO;  Location: [facility]  ENDOSCOPY;  Service:    • MOUTH SURGERY      Due to staph infection in lower lip       Family History   Problem Relation Age of Onset   • Cancer Other    • Diabetes Other    • Heart disease Other    • Hypertension Other    • Stroke Other    • Thyroid disease Other    • Other Other         Colon Problems       Social History     Social History   • Marital status:      Social History Main Topics   • Smoking status: Never Smoker   • Smokeless tobacco: Never Used   • Alcohol use No   • Drug use: No   • Sexual activity: Defer     Other Topics Concern   • Not on file           Objective   Physical Exam   Constitutional: She is oriented to person, place, and time. She appears well-developed and well-nourished.   HENT:   Head: Normocephalic and atraumatic.   Nose: Nose normal.   Mouth/Throat: Oropharynx is clear and moist.   Eyes: Pupils are equal, round, and reactive to light.   Neck: Normal range of motion. No JVD present. No tracheal deviation present. No thyromegaly present.   Cardiovascular: Normal rate, regular rhythm, normal heart sounds and intact distal pulses.  Exam reveals no friction rub.    No murmur heard.  Pulmonary/Chest: Effort normal and breath sounds normal. She has no wheezes.   Abdominal: Soft. Normal appearance and bowel sounds are normal. There is tenderness in the right upper quadrant and epigastric area. There is no rigidity, no rebound, no guarding, no CVA tenderness, no tenderness at McBurney's point and negative Oakes's sign.   Musculoskeletal: She exhibits no edema.        Right wrist: She exhibits decreased range of motion and tenderness. She exhibits no bony tenderness, no swelling, no effusion, no crepitus, no deformity and no laceration.   Lymphadenopathy:     She has no cervical adenopathy.   Neurological: She is alert and oriented to person, place, and time. GCS eye subscore is 4. GCS verbal subscore is 5. GCS motor subscore is 6.   Skin: Skin is warm and dry.   Nursing note and  vitals reviewed.      Procedures           ED Course      Labs Reviewed   COMPREHENSIVE METABOLIC PANEL - Abnormal; Notable for the following:        Result Value    ALT (SGPT) 53 (*)     AST (SGOT) 58 (*)     All other components within normal limits    Narrative:     The MDRD GFR formula is only valid for adults with stable renal function between ages 18 and 70.   URINALYSIS W/ CULTURE IF INDICATED - Abnormal; Notable for the following:     Blood, UA Trace (*)     Leuk Esterase, UA Trace (*)     All other components within normal limits   URINALYSIS, MICROSCOPIC ONLY - Abnormal; Notable for the following:     RBC, UA 3-5 (*)     Squamous Epithelial Cells, UA 3-5 (*)     All other components within normal limits   LIPASE - Normal   CBC WITH AUTO DIFFERENTIAL - Normal   RAINBOW DRAW    Narrative:     The following orders were created for panel order [place] Draw.  Procedure                               Abnormality         Status                     ---------                               -----------         ------                     Light Blue Top[[id number]]                                   Final result               Green Top (Gel)[[id number]]                                  Final result               Lavender Top[[id number]]                                     Final result               Gold Top - SST[[id number]]                                   Final result                 Please view results for these tests on the individual orders.   CBC AND DIFFERENTIAL    Narrative:     The following orders were created for panel order CBC & Differential.  Procedure                               Abnormality         Status                     ---------                               -----------         ------                     CBC Auto Differential[[id number]]        Normal              Final result                 Please view results for these tests on the individual orders.   LIGHT BLUE TOP   GREEN TOP   LAVENDER TOP    [facility]     Xr Chest 2 View    Result Date: 6/5/2018  Narrative: EXAM:          Radiograph(s), Chest VIEWS:    PA / Lat ; 2     DATE/TIME:  6/5/2018 1:01 PM CDT            INDICATION:   fall  COMPARISON:  CXR: 10/9/16, CT A/P 10/9/16         FINDINGS:         - lines/tubes:    none   - cardiac:         size within normal limits       - mediastinum: contour within normal limits       - lungs:         no focal air space process, pulmonary interstitial edema, nodule(s)/mass           - pleura:         no evidence of  fluid                - osseous:         No evidence of a displaced rib fracture.               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                         Impression: CONCLUSION:    1. No evidence of an active cardiopulmonary process.                                  Electronically signed by:  CARLIN Krishnan MD  6/5/2018 1:03 PM CDT Workstation: [id number]    Xr Wrist 3+ View Right    Result Date: 6/5/2018  Narrative: EXAM:  Radiograph(s), Osseous       REGION:   Wrist  SIDE:     Right   VIEWS:   4         INDICATION:    wrist pain   COMPARISON:    none          FINDINGS:       No evidence of a fracture or bony malalignment.   No evidence of osteolytic/osteoblastic disease.   Age-related degenerative changes at the base of the thumb. Well-corticated bony fragment projects laterally to the trapezium.                         .        Impression: CONCLUSION:       1. No radiographic evidence of acute osseous pathology.          Note:  if pain or symptoms persist beyond reasonable expectations and follow-up imaging is anticipated,  cross sectional imaging (CT and/or MRI) is suggested, as is deemed clinically appropriate.            Electronically signed by:  CARLIN Krishnan MD  6/5/2018 12:59 PM CDT Workstation: [id number]    Xr Hand 3+ View Right    Result  Date: 6/5/2018  Narrative: PROCEDURE: Three views right hand COMPARISON: No comparison HISTORY: Injury, pain, fifth metacarpal and fifth digit FINDINGS: No acute fracture, subluxation or focal osseous lesion. There are moderate to severe degenerative changes of the first carpal metacarpal joint consistent with osteoarthritis.     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 No radiographic evidence of acute fracture. Electronically signed by:  Jesus Banks MD  6/5/2018 1:03 PM CDT Workstation: [id number]    Ct Abdomen Pelvis With Contrast    Result Date: 6/5/2018  Narrative: CT abdomen, pelvis with contrast. CLINICAL INDICATION: Patient fell. Abdominal trauma/pain   COMPARISON: MRI abdomen December 5, 2017.   TECHNIQUE: 80 mL Isovue-300,  nonionic IV contrast. Helical scanning with axial and coronal reformations. Soft tissue, lung, and bone windows reviewed. This exam was performed according to our departmental dose-optimization program, which includes automated exposure control, adjustment of the mA and/or kV according to patient size and/or use of iterative reconstruction technique. ABDOMEN CT FINDINGS: The visualized lung bases show minor dependent changes. The gallbladder is surgically absent. Liver,  spleen, pancreas, adrenal glands and kidneys are normal in appearance. Bowel grossly unremarkable. Normal appendix. No evidence of pathologically enlarged nodes, free air, or free fluid. Multilevel degenerative changes lumbar spine. No evidence of any acute fracture. PELVIS CT FINDINGS: There are no pelvic masses.    No evidence of pathologically enlarged nodes, free air, or free fluid. The bones are grossly unremarkable.     Impression: CONCLUSION: Evidence of prior cholecystectomy. Multilevel degenerative changes lumbar spine. CT abdomen, pelvis with contrast is otherwise unremarkable. No acute traumatic changes. Electronically signed by:  Nato Mares MD  6/5/2018 2:35 PM CDT  Workstation: [id number]                [facility]      Final diagnoses:   Fall, initial encounter   Sprain of right wrist, initial encounter   Elevated LFTs            Bruno Broussard MD  06/05/18 [id number]

## 2018-10-04 ENCOUNTER — TRANSCRIBE ORDERS (OUTPATIENT)
Dept: PODIATRY | Facility: CLINIC | Age: 78
End: 2018-10-04

## 2018-10-04 DIAGNOSIS — L60.3 ONYCHODYSTROPHY: Primary | ICD-10-CM

## 2018-10-30 ENCOUNTER — OFFICE VISIT (OUTPATIENT)
Dept: PODIATRY | Facility: CLINIC | Age: 78
End: 2018-10-30

## 2018-10-30 VITALS — HEIGHT: 63 IN | OXYGEN SATURATION: 97 % | BODY MASS INDEX: 31.61 KG/M2 | WEIGHT: 178.4 LBS | HEART RATE: 58 BPM

## 2018-10-30 DIAGNOSIS — M79.674 CHRONIC PAIN OF TOE OF RIGHT FOOT: ICD-10-CM

## 2018-10-30 DIAGNOSIS — L60.2 ONYCHOGRYPHOSIS: ICD-10-CM

## 2018-10-30 DIAGNOSIS — G89.29 CHRONIC PAIN OF TOE OF LEFT FOOT: Primary | ICD-10-CM

## 2018-10-30 DIAGNOSIS — M79.675 CHRONIC PAIN OF TOE OF LEFT FOOT: Primary | ICD-10-CM

## 2018-10-30 DIAGNOSIS — G89.29 CHRONIC PAIN OF TOE OF RIGHT FOOT: ICD-10-CM

## 2018-10-30 PROCEDURE — 99203 OFFICE O/P NEW LOW 30 MIN: CPT | Performed by: PODIATRIST

## 2018-10-30 PROCEDURE — 11750 EXCISION NAIL&NAIL MATRIX: CPT | Performed by: PODIATRIST

## 2018-10-30 NOTE — PROGRESS NOTES
Hallie Milan  1940  78 y.o. female  Not diabetic    Patient presents today with a request of having her bilateral great toenails removed permanently.    10/30/2018    Chief Complaint   Patient presents with   • Left Foot - toenail issue   • Right Foot - toenail issue       History of Present Illness    Hallie Milan is a 78 y.o.female who presents to clinic today with chief complaint of bilateral great toe pain.  Patient states that the toenails are thickened and irregularly shaped.  They grow into the skin and cause her pain with ambulation in close toed shoes.  She has done nothing to treat them recently.  She denies any injuries or trauma to her toes.  She is requesting that the toenails be removed permanently.  She denies any other pedal complaints at this time.      Past Medical History:   Diagnosis Date   • Allergic rhinitis    • Asthma    • Benign paroxysmal positional vertigo    • Chronic laryngitis    • Ear problem     ear dysfunction   • GERD (gastroesophageal reflux disease)    • Hypertension    • Kidney stone     Hx of   • Morgagni hernia    • Rapid heart beat    • Temporomandibular joint disorder    • Ulcer    • Vertigo          Past Surgical History:   Procedure Laterality Date   • CHOLECYSTECTOMY     • COLONOSCOPY N/A 12/11/2017    Procedure: COLONOSCOPY;  Surgeon: Curtis Rick DO;  Location: [facility] ENDOSCOPY;  Service:    • ENDOSCOPY N/A 12/11/2017    Procedure: ESOPHAGOGASTRODUODENOSCOPY;  Surgeon: Curtis Rick DO;  Location: [facility] ENDOSCOPY;  Service:    • ENDOSCOPY N/A 2/8/2018    Procedure: ESOPHAGOGASTRODUODENOSCOPY;  Surgeon: Curtis Rick DO;  Location: [facility] ENDOSCOPY;  Service:    • MOUTH SURGERY      Due to staph infection in lower lip         Family History   Problem Relation Age of Onset   • Cancer Other    • Diabetes Other    • Heart disease Other    • Hypertension Other    • Stroke Other    • Thyroid disease Other    • Other Other         Colon Problems    • Cancer Mother    • Heart disease Mother    • Diabetes Mother    • Hypertension Mother    • Thyroid disease Mother    • Heart disease Father    • Cancer Sister    • Diabetes Sister    • Hypertension Sister    • Cancer Brother        Allergies   Allergen Reactions   • Codeine Other (See Comments)     oversedation   • Minoxidil      Made her heart feel funny    • Claritin-D 12 Hour [Loratadine-Pseudoephedrine Er] Anxiety and Palpitations   • Prednisone Anxiety and Palpitations       Social History     Social History   • Marital status:      Spouse name: N/A   • Number of children: N/A   • Years of education: N/A     Occupational History   • Not on file.     Social History Main Topics   • Smoking status: Never Smoker   • Smokeless tobacco: Never Used   • Alcohol use No   • Drug use: No   • Sexual activity: Defer     Other Topics Concern   • Not on file     Social History Narrative   • No narrative on file         Current Outpatient Prescriptions   Medication Sig Dispense Refill   • albuterol (PROVENTIL HFA;VENTOLIN HFA) 108 (90 BASE) MCG/ACT inhaler Inhale 1 puff Every 4 (Four) Hours As Needed for wheezing.     • B COMPLEX VITAMINS PO Take 1,000 mcg by mouth 1 (One) Time Per Week.     • beclomethasone (QVAR) 40 MCG/ACT inhaler Inhale 2 puffs 2 (Two) Times a Day.     • MAGNESIUM GLUCONATE PO Take 250 mg by mouth Daily.     • meclizine (ANTIVERT) 25 MG tablet Take 12.5-25 mg by mouth 3 (Three) Times a Day As Needed for dizziness.     • nebivolol (BYSTOLIC) 5 MG tablet Take 5 mg by mouth Daily.     • Omega-3 Fatty Acids (FISH OIL) 500 MG capsule Take 900 mg by mouth 2 (Two) Times a Day.     • pantoprazole (PROTONIX) 40 MG EC tablet Take 40 mg by mouth 2 (Two) Times a Day.       No current facility-administered medications for this visit.        Review of Systems   Constitutional: Positive for chills and fatigue.   HENT: Negative.    Eyes: Negative.    Respiratory: Positive for cough, choking, shortness of breath  "and wheezing.    Cardiovascular: Positive for leg swelling.   Gastrointestinal: Positive for abdominal pain.   Endocrine: Positive for cold intolerance.   Genitourinary: Positive for enuresis and frequency.   Musculoskeletal:        Ankle pain   Skin: Negative.    Neurological: Positive for dizziness.   Psychiatric/Behavioral: Negative.          OBJECTIVE    Pulse 58   Ht 158.8 cm (62.5\")   Wt 80.9 kg (178 lb 6.4 oz)   SpO2 97%   BMI 32.11 kg/m²       Physical Exam   Constitutional: She is oriented to person, place, and time. She appears well-developed and well-nourished. No distress.   HENT:   Head: Normocephalic and atraumatic.   Nose: Nose normal.   Eyes: Pupils are equal, round, and reactive to light. Conjunctivae and EOM are normal.   Pulmonary/Chest: Effort normal. No respiratory distress.   Neurological: She is oriented to person, place, and time. She displays normal reflexes.   Skin: Skin is warm and dry. Capillary refill takes less than 2 seconds.   Psychiatric: She has a normal mood and affect. Her behavior is normal.   Vitals reviewed.      Gait: normal     Assistive Device: none     Lower Extremity    Cardiovascular:    DP/PT pulses palpable bilateral  CFT brisk  to all digits  Skin temp is warm to warm from proximal tibia to distal digits bilateral  Pedal hair growth present.   No erythema or edema noted     Musculoskeletal:  Muscle strength is 5/5 for all muscle groups tested   ROM of the 1st MTP is full without pain or crepitus bilateral  ROM of the ankle joint is full without pain or crepitus  bilateral    Dermatological:   Bilateral hallux toenails are thickened, discolored and painful to palpation.  Skin is warm, dry and intact bilateral  Webspaces 1-4 bilateral are clean, dry and intact.   No subcutaneous nodules or masses noted    No open wounds noted     Neurological:   Protective sensation intact   Sensation intact to light touch          Nail Removal  Date/Time: 10/30/2018 3:22 " PM  Performed by: RAMSEY MILLS  Authorized by: RAMSEY MILLS   Consent: Verbal consent obtained. Written consent obtained.  Risks and benefits: risks, benefits and alternatives were discussed  Consent given by: patient  Patient understanding: patient states understanding of the procedure being performed  Patient identity confirmed: verbally with patient  Nail removal extremity: bilateral hallux toenails.  Anesthesia: digital block    Anesthesia:  Local Anesthetic: lidocaine 2% without epinephrine    Sedation:  Patient sedated: no  Preparation: skin prepped with Betadine  Amount removed: complete (Right and left hallux nail plate was  from underlying nailbed with blunt dissection and removed with hemostat.)  Nail matrix removed: complete (Phenol )  Removed nail replaced and anchored: no  Dressing: antibiotic ointment and dressing applied  Patient tolerance: Patient tolerated the procedure well with no immediate complications              ASSESSMENT AND PLAN    Hallie was seen today for toenail issue and toenail issue.    Diagnoses and all orders for this visit:    Chronic pain of toe of left foot    Chronic pain of toe of right foot    Onychogryphosis      - Comprehensive foot and ankle exam performed  - Treatment of painful onychogryphotic toenails discussed with patient, including risks and potential benefits of nail avulsion both temporary and permanent versus simple debridement.  - Patient elected for a total permanent nail avulsion to bilateral hallux toenails.   - Dispensed aftercare instruction sheet  - All questions were answered and the patient is in agreement with the current treatment plan.  - RTC in 2 weeks          This document has been electronically signed by Ramsey Mills DPM on October 30, 2018 3:21 PM     10/30/2018  3:21 PM

## 2018-11-13 ENCOUNTER — OFFICE VISIT (OUTPATIENT)
Dept: PODIATRY | Facility: CLINIC | Age: 78
End: 2018-11-13

## 2018-11-13 VITALS — WEIGHT: 178 LBS | BODY MASS INDEX: 31.54 KG/M2 | OXYGEN SATURATION: 98 % | HEIGHT: 63 IN | HEART RATE: 68 BPM

## 2018-11-13 DIAGNOSIS — L60.2 ONYCHOGRYPHOSIS: Primary | ICD-10-CM

## 2018-11-13 PROCEDURE — 99212 OFFICE O/P EST SF 10 MIN: CPT | Performed by: PODIATRIST

## 2018-11-13 NOTE — PROGRESS NOTES
Hallie Milan  1940  78 y.o. female  Not diabetic    Patient presents today for recheck of her bilateral great toenail avulsion.    11/13/2018     Chief Complaint   Patient presents with   • Left Foot - Follow-up   • Right Foot - Follow-up       History of Present Illness    Patient presents to clinic today for recheck of her bilateral great toenail avulsions.  She has been soaking and dressing them at home.  She denies pain.  She denies any other pedal complaints.    Past Medical History:   Diagnosis Date   • Allergic rhinitis    • Asthma    • Benign paroxysmal positional vertigo    • Chronic laryngitis    • Ear problem     ear dysfunction   • GERD (gastroesophageal reflux disease)    • Hypertension    • Ingrown toenail    • Kidney stone     Hx of   • Morgagni hernia    • Onychomycosis    • Rapid heart beat    • Temporomandibular joint disorder    • Ulcer    • Vertigo          Past Surgical History:   Procedure Laterality Date   • CHOLECYSTECTOMY     • MOUTH SURGERY      Due to staph infection in lower lip         Family History   Problem Relation Age of Onset   • Cancer Other    • Diabetes Other    • Heart disease Other    • Hypertension Other    • Stroke Other    • Thyroid disease Other    • Other Other         Colon Problems   • Cancer Mother    • Heart disease Mother    • Diabetes Mother    • Hypertension Mother    • Thyroid disease Mother    • Heart disease Father    • Cancer Sister    • Diabetes Sister    • Hypertension Sister    • Cancer Brother        Allergies   Allergen Reactions   • Codeine Other (See Comments)     oversedation   • Minoxidil      Made her heart feel funny    • Claritin-D 12 Hour [Loratadine-Pseudoephedrine Er] Anxiety and Palpitations   • Prednisone Anxiety and Palpitations       Social History     Socioeconomic History   • Marital status:      Spouse name: Not on file   • Number of children: Not on file   • Years of education: Not on file   • Highest education level: Not  "on file   Social Needs   • Financial resource strain: Not on file   • Food insecurity - worry: Not on file   • Food insecurity - inability: Not on file   • Transportation needs - medical: Not on file   • Transportation needs - non-medical: Not on file   Occupational History   • Not on file   Tobacco Use   • Smoking status: Never Smoker   • Smokeless tobacco: Never Used   Substance and Sexual Activity   • Alcohol use: No   • Drug use: No   • Sexual activity: Defer   Other Topics Concern   • Not on file   Social History Narrative   • Not on file         Current Outpatient Medications   Medication Sig Dispense Refill   • albuterol (PROVENTIL HFA;VENTOLIN HFA) 108 (90 BASE) MCG/ACT inhaler Inhale 1 puff Every 4 (Four) Hours As Needed for wheezing.     • B COMPLEX VITAMINS PO Take 1,000 mcg by mouth 1 (One) Time Per Week.     • beclomethasone (QVAR) 40 MCG/ACT inhaler Inhale 2 puffs 2 (Two) Times a Day.     • MAGNESIUM GLUCONATE PO Take 250 mg by mouth Daily.     • meclizine (ANTIVERT) 25 MG tablet Take 12.5-25 mg by mouth 3 (Three) Times a Day As Needed for dizziness.     • nebivolol (BYSTOLIC) 5 MG tablet Take 5 mg by mouth Daily.     • Omega-3 Fatty Acids (FISH OIL) 500 MG capsule Take 900 mg by mouth 2 (Two) Times a Day.     • pantoprazole (PROTONIX) 40 MG EC tablet Take 40 mg by mouth 2 (Two) Times a Day.       No current facility-administered medications for this visit.        Review of Systems   Constitutional: Positive for chills and fatigue.   HENT: Negative.    Eyes: Negative.    Respiratory: Positive for cough, choking, shortness of breath and wheezing.    Cardiovascular: Positive for leg swelling.   Gastrointestinal: Positive for abdominal pain.   Skin: Negative.    Neurological: Positive for dizziness.   Psychiatric/Behavioral: Negative.          OBJECTIVE    Pulse 68   Ht 158.8 cm (62.5\")   Wt 80.7 kg (178 lb)   SpO2 98%   BMI 32.04 kg/m²       Physical Exam   Constitutional: She is oriented to person, " place, and time. She appears well-developed and well-nourished. No distress.   HENT:   Head: Normocephalic and atraumatic.   Nose: Nose normal.   Eyes: Conjunctivae and EOM are normal. Pupils are equal, round, and reactive to light.   Pulmonary/Chest: Effort normal. No respiratory distress. She has no wheezes.   Neurological: She is oriented to person, place, and time. She displays normal reflexes.   Skin: Skin is warm and dry. Capillary refill takes less than 2 seconds.   Psychiatric: She has a normal mood and affect. Her behavior is normal.   Vitals reviewed.      Gait: normal     Assistive Device: none     Lower Extremity    Cardiovascular:    DP/PT pulses palpable bilateral  CFT brisk  to all digits  Skin temp is warm to warm from proximal tibia to distal digits bilateral  Pedal hair growth present.   No erythema or edema noted     Musculoskeletal:  Muscle strength is 5/5 for all muscle groups tested   ROM of the 1st MTP is full without pain or crepitus bilateral  ROM of the ankle joint is full without pain or crepitus  bilateral    Dermatological:   Bilateral hallux toenails are absent without signs of infection   Skin is warm, dry and intact bilateral  Webspaces 1-4 bilateral are clean, dry and intact.   No subcutaneous nodules or masses noted    No open wounds noted     Neurological:   Protective sensation intact   Sensation intact to light touch          Procedures        ASSESSMENT AND PLAN    Hallie was seen today for follow-up and follow-up.    Diagnoses and all orders for this visit:    Onychogryphosis      - Patient is doing well post toenail avulsions.  Today upon Band-Aid removal of the left great toe a small amount of plantar skin was removed with the Band-Aid.  The toes were dressed and the patient instructed to continue soaking and dressing the toes until there is no drainage on the Band-Aid.  - All questions were answered and the patient is in agreement with the current treatment plan.  - RTC as  needed          This document has been electronically signed by Lon Wheeler DPM on November 13, 2018 12:46 PM     11/13/2018  12:46 PM

## 2018-12-26 ENCOUNTER — HOSPITAL ENCOUNTER (OUTPATIENT)
Facility: HOSPITAL | Age: 78
Setting detail: OBSERVATION
Discharge: HOME | End: 2018-12-28
Attending: EMERGENCY MEDICINE | Admitting: INTERNAL MEDICINE

## 2018-12-26 ENCOUNTER — APPOINTMENT (OUTPATIENT)
Dept: GENERAL RADIOLOGY | Facility: HOSPITAL | Age: 78
End: 2018-12-26

## 2018-12-26 DIAGNOSIS — R07.9 CHEST PAIN, UNSPECIFIED TYPE: ICD-10-CM

## 2018-12-26 DIAGNOSIS — I11.9 BENIGN HYPERTENSIVE HEART DISEASE WITHOUT HEART FAILURE: ICD-10-CM

## 2018-12-26 DIAGNOSIS — I10 BENIGN HYPERTENSION: ICD-10-CM

## 2018-12-26 DIAGNOSIS — R07.9 CHEST PAIN WITH HIGH RISK OF ACUTE CORONARY SYNDROME: Primary | ICD-10-CM

## 2018-12-26 PROBLEM — R07.1 CHEST PAIN ON BREATHING: Status: ACTIVE | Noted: 2018-12-26

## 2018-12-26 LAB
ALBUMIN SERPL-MCNC: 4.3 G/DL (ref 3.4–4.8)
ALBUMIN/GLOB SERPL: 1.3 G/DL (ref 1.1–1.8)
ALP SERPL-CCNC: 53 U/L (ref 38–126)
ALT SERPL W P-5'-P-CCNC: 24 U/L (ref 9–52)
ANION GAP SERPL CALCULATED.3IONS-SCNC: 9 MMOL/L (ref 5–15)
APTT PPP: 29.5 SECONDS (ref 20–40.3)
AST SERPL-CCNC: 29 U/L (ref 14–36)
BASOPHILS # BLD AUTO: 0.02 10*3/MM3 (ref 0–0.2)
BASOPHILS NFR BLD AUTO: 0.2 % (ref 0–2)
BILIRUB SERPL-MCNC: 0.9 MG/DL (ref 0.2–1.3)
BUN BLD-MCNC: 14 MG/DL (ref 7–21)
BUN/CREAT SERPL: 19.2 (ref 7–25)
CALCIUM SPEC-SCNC: 9.5 MG/DL (ref 8.4–10.2)
CHLORIDE SERPL-SCNC: 101 MMOL/L (ref 95–110)
CK MB SERPL-CCNC: 0.6 NG/ML (ref 0–5)
CK SERPL-CCNC: 43 U/L (ref 30–135)
CO2 SERPL-SCNC: 24 MMOL/L (ref 22–31)
CREAT BLD-MCNC: 0.73 MG/DL (ref 0.5–1)
D-DIMER, QUANTITATIVE (MAD,POW, STR): 664 NG/ML (FEU) (ref 0–470)
DEPRECATED RDW RBC AUTO: 46 FL (ref 36.4–46.3)
EOSINOPHIL # BLD AUTO: 0.05 10*3/MM3 (ref 0–0.7)
EOSINOPHIL NFR BLD AUTO: 0.5 % (ref 0–7)
ERYTHROCYTE [DISTWIDTH] IN BLOOD BY AUTOMATED COUNT: 13.9 % (ref 11.5–14.5)
GFR SERPL CREATININE-BSD FRML MDRD: 78 ML/MIN/1.73 (ref 39–90)
GLOBULIN UR ELPH-MCNC: 3.3 GM/DL (ref 2.3–3.5)
GLUCOSE BLD-MCNC: 106 MG/DL (ref 60–100)
HCT VFR BLD AUTO: 37.1 % (ref 35–45)
HGB BLD-MCNC: 12.4 G/DL (ref 12–15.5)
HOLD SPECIMEN: NORMAL
HOLD SPECIMEN: NORMAL
IMM GRANULOCYTES # BLD AUTO: 0.02 10*3/MM3 (ref 0–0.02)
IMM GRANULOCYTES NFR BLD AUTO: 0.2 % (ref 0–0.5)
INR PPP: 1.1 (ref 0.8–1.2)
LYMPHOCYTES # BLD AUTO: 1.55 10*3/MM3 (ref 0.6–4.2)
LYMPHOCYTES NFR BLD AUTO: 15.2 % (ref 10–50)
MCH RBC QN AUTO: 30.4 PG (ref 26.5–34)
MCHC RBC AUTO-ENTMCNC: 33.4 G/DL (ref 31.4–36)
MCV RBC AUTO: 90.9 FL (ref 80–98)
MONOCYTES # BLD AUTO: 1.27 10*3/MM3 (ref 0–0.9)
MONOCYTES NFR BLD AUTO: 12.4 % (ref 0–12)
NEUTROPHILS # BLD AUTO: 7.31 10*3/MM3 (ref 2–8.6)
NEUTROPHILS NFR BLD AUTO: 71.5 % (ref 37–80)
NT-PROBNP SERPL-MCNC: 1260 PG/ML (ref 0–1800)
PLATELET # BLD AUTO: 212 10*3/MM3 (ref 150–450)
PMV BLD AUTO: 10.5 FL (ref 8–12)
POTASSIUM BLD-SCNC: 4 MMOL/L (ref 3.5–5.1)
PROT SERPL-MCNC: 7.6 G/DL (ref 6.3–8.6)
PROTHROMBIN TIME: 14.1 SECONDS (ref 11.1–15.3)
RBC # BLD AUTO: 4.08 10*6/MM3 (ref 3.77–5.16)
SODIUM BLD-SCNC: 134 MMOL/L (ref 137–145)
TROPONIN I SERPL-MCNC: <0.012 NG/ML
TROPONIN I SERPL-MCNC: <0.012 NG/ML
WBC NRBC COR # BLD: 10.22 10*3/MM3 (ref 3.2–9.8)
WHOLE BLOOD HOLD SPECIMEN: NORMAL
WHOLE BLOOD HOLD SPECIMEN: NORMAL

## 2018-12-26 PROCEDURE — 93010 ELECTROCARDIOGRAM REPORT: CPT | Performed by: INTERNAL MEDICINE

## 2018-12-26 PROCEDURE — G0378 HOSPITAL OBSERVATION PER HR: HCPCS

## 2018-12-26 PROCEDURE — 85025 COMPLETE CBC W/AUTO DIFF WBC: CPT | Performed by: EMERGENCY MEDICINE

## 2018-12-26 PROCEDURE — 84484 ASSAY OF TROPONIN QUANT: CPT | Performed by: EMERGENCY MEDICINE

## 2018-12-26 PROCEDURE — 83880 ASSAY OF NATRIURETIC PEPTIDE: CPT | Performed by: EMERGENCY MEDICINE

## 2018-12-26 PROCEDURE — 99285 EMERGENCY DEPT VISIT HI MDM: CPT

## 2018-12-26 PROCEDURE — 96374 THER/PROPH/DIAG INJ IV PUSH: CPT

## 2018-12-26 PROCEDURE — 84484 ASSAY OF TROPONIN QUANT: CPT | Performed by: NURSE PRACTITIONER

## 2018-12-26 PROCEDURE — 85379 FIBRIN DEGRADATION QUANT: CPT | Performed by: EMERGENCY MEDICINE

## 2018-12-26 PROCEDURE — 85730 THROMBOPLASTIN TIME PARTIAL: CPT | Performed by: EMERGENCY MEDICINE

## 2018-12-26 PROCEDURE — 93005 ELECTROCARDIOGRAM TRACING: CPT | Performed by: EMERGENCY MEDICINE

## 2018-12-26 PROCEDURE — 96361 HYDRATE IV INFUSION ADD-ON: CPT

## 2018-12-26 PROCEDURE — 93005 ELECTROCARDIOGRAM TRACING: CPT

## 2018-12-26 PROCEDURE — 96372 THER/PROPH/DIAG INJ SC/IM: CPT

## 2018-12-26 PROCEDURE — 82550 ASSAY OF CK (CPK): CPT | Performed by: EMERGENCY MEDICINE

## 2018-12-26 PROCEDURE — 82553 CREATINE MB FRACTION: CPT | Performed by: EMERGENCY MEDICINE

## 2018-12-26 PROCEDURE — 71045 X-RAY EXAM CHEST 1 VIEW: CPT

## 2018-12-26 PROCEDURE — 85610 PROTHROMBIN TIME: CPT | Performed by: EMERGENCY MEDICINE

## 2018-12-26 PROCEDURE — 25010000002 ENOXAPARIN PER 10 MG: Performed by: EMERGENCY MEDICINE

## 2018-12-26 PROCEDURE — 80053 COMPREHEN METABOLIC PANEL: CPT | Performed by: EMERGENCY MEDICINE

## 2018-12-26 RX ORDER — NEBIVOLOL 5 MG/1
5 TABLET ORAL DAILY
Status: DISCONTINUED | OUTPATIENT
Start: 2018-12-27 | End: 2018-12-28 | Stop reason: HOSPADM

## 2018-12-26 RX ORDER — PANTOPRAZOLE SODIUM 40 MG/1
40 TABLET, DELAYED RELEASE ORAL
Status: DISCONTINUED | OUTPATIENT
Start: 2018-12-27 | End: 2018-12-28 | Stop reason: HOSPADM

## 2018-12-26 RX ORDER — SODIUM CHLORIDE 0.9 % (FLUSH) 0.9 %
10 SYRINGE (ML) INJECTION AS NEEDED
Status: DISCONTINUED | OUTPATIENT
Start: 2018-12-26 | End: 2018-12-28 | Stop reason: HOSPADM

## 2018-12-26 RX ORDER — SODIUM CHLORIDE 0.9 % (FLUSH) 0.9 %
3-10 SYRINGE (ML) INJECTION AS NEEDED
Status: DISCONTINUED | OUTPATIENT
Start: 2018-12-26 | End: 2018-12-26

## 2018-12-26 RX ORDER — FAMOTIDINE 10 MG/ML
20 INJECTION, SOLUTION INTRAVENOUS ONCE
Status: COMPLETED | OUTPATIENT
Start: 2018-12-26 | End: 2018-12-26

## 2018-12-26 RX ORDER — NITROGLYCERIN 0.4 MG/1
0.4 TABLET SUBLINGUAL
Status: DISCONTINUED | OUTPATIENT
Start: 2018-12-26 | End: 2018-12-28 | Stop reason: HOSPADM

## 2018-12-26 RX ORDER — SODIUM CHLORIDE 0.9 % (FLUSH) 0.9 %
3 SYRINGE (ML) INJECTION EVERY 12 HOURS SCHEDULED
Status: DISCONTINUED | OUTPATIENT
Start: 2018-12-27 | End: 2018-12-26

## 2018-12-26 RX ORDER — ASPIRIN 81 MG/1
324 TABLET, CHEWABLE ORAL ONCE
Status: COMPLETED | OUTPATIENT
Start: 2018-12-26 | End: 2018-12-26

## 2018-12-26 RX ORDER — ONDANSETRON 2 MG/ML
4 INJECTION INTRAMUSCULAR; INTRAVENOUS EVERY 6 HOURS PRN
Status: DISCONTINUED | OUTPATIENT
Start: 2018-12-26 | End: 2018-12-28 | Stop reason: HOSPADM

## 2018-12-26 RX ORDER — SODIUM CHLORIDE 9 MG/ML
75 INJECTION, SOLUTION INTRAVENOUS CONTINUOUS
Status: DISCONTINUED | OUTPATIENT
Start: 2018-12-26 | End: 2018-12-27

## 2018-12-26 RX ADMIN — ENOXAPARIN SODIUM 80 MG: 80 INJECTION SUBCUTANEOUS at 17:57

## 2018-12-26 RX ADMIN — ASPIRIN 81 MG CHEWABLE TABLET 324 MG: 81 TABLET CHEWABLE at 14:39

## 2018-12-26 RX ADMIN — NITROGLYCERIN 0.4 MG: 0.4 TABLET, ORALLY DISINTEGRATING SUBLINGUAL at 14:55

## 2018-12-26 RX ADMIN — FAMOTIDINE 20 MG: 10 INJECTION INTRAVENOUS at 15:03

## 2018-12-26 RX ADMIN — Medication 10 ML: at 14:27

## 2018-12-26 RX ADMIN — NITROGLYCERIN 1 INCH: 20 OINTMENT TOPICAL at 17:57

## 2018-12-26 RX ADMIN — NITROGLYCERIN 0.4 MG: 0.4 TABLET, ORALLY DISINTEGRATING SUBLINGUAL at 15:06

## 2018-12-26 RX ADMIN — SODIUM CHLORIDE 75 ML/HR: 900 INJECTION, SOLUTION INTRAVENOUS at 14:42

## 2018-12-27 ENCOUNTER — APPOINTMENT (OUTPATIENT)
Dept: CARDIOLOGY | Facility: HOSPITAL | Age: 78
End: 2018-12-27

## 2018-12-27 LAB
ANION GAP SERPL CALCULATED.3IONS-SCNC: 10 MMOL/L (ref 5–15)
BASOPHILS # BLD AUTO: 0.03 10*3/MM3 (ref 0–0.2)
BASOPHILS NFR BLD AUTO: 0.3 % (ref 0–2)
BH CV ECHO MEAS - ACS: 1.8 CM
BH CV ECHO MEAS - AO ISTHMUS: 1.9 CM
BH CV ECHO MEAS - AO MAX PG (FULL): 6.2 MMHG
BH CV ECHO MEAS - AO MAX PG: 12.5 MMHG
BH CV ECHO MEAS - AO MEAN PG (FULL): 2 MMHG
BH CV ECHO MEAS - AO MEAN PG: 6 MMHG
BH CV ECHO MEAS - AO ROOT AREA (BSA CORRECTED): 1.7
BH CV ECHO MEAS - AO ROOT AREA: 7.1 CM^2
BH CV ECHO MEAS - AO ROOT DIAM: 3 CM
BH CV ECHO MEAS - AO V2 MAX: 177 CM/SEC
BH CV ECHO MEAS - AO V2 MEAN: 118 CM/SEC
BH CV ECHO MEAS - AO V2 VTI: 30.9 CM
BH CV ECHO MEAS - ASC AORTA: 2.9 CM
BH CV ECHO MEAS - AVA(I,A): 2.5 CM^2
BH CV ECHO MEAS - AVA(I,D): 2.5 CM^2
BH CV ECHO MEAS - AVA(V,A): 2 CM^2
BH CV ECHO MEAS - AVA(V,D): 2 CM^2
BH CV ECHO MEAS - BSA(HAYCOCK): 1.9 M^2
BH CV ECHO MEAS - BSA: 1.8 M^2
BH CV ECHO MEAS - BZI_BMI: 31.8 KILOGRAMS/M^2
BH CV ECHO MEAS - BZI_METRIC_HEIGHT: 157.5 CM
BH CV ECHO MEAS - BZI_METRIC_WEIGHT: 78.9 KG
BH CV ECHO MEAS - EDV(CUBED): 62.1 ML
BH CV ECHO MEAS - EDV(TEICH): 68.3 ML
BH CV ECHO MEAS - EF(CUBED): 80.2 %
BH CV ECHO MEAS - EF(TEICH): 73.2 %
BH CV ECHO MEAS - ESV(CUBED): 12.3 ML
BH CV ECHO MEAS - ESV(TEICH): 18.3 ML
BH CV ECHO MEAS - FS: 41.7 %
BH CV ECHO MEAS - IVS/LVPW: 1.1
BH CV ECHO MEAS - IVSD: 1.2 CM
BH CV ECHO MEAS - LA DIMENSION: 4.1 CM
BH CV ECHO MEAS - LA/AO: 1.4
BH CV ECHO MEAS - LV MASS(C)D: 151.1 GRAMS
BH CV ECHO MEAS - LV MASS(C)DI: 83.9 GRAMS/M^2
BH CV ECHO MEAS - LV MAX PG: 6.4 MMHG
BH CV ECHO MEAS - LV MEAN PG: 4 MMHG
BH CV ECHO MEAS - LV V1 MAX: 126 CM/SEC
BH CV ECHO MEAS - LV V1 MEAN: 93.3 CM/SEC
BH CV ECHO MEAS - LV V1 VTI: 27.6 CM
BH CV ECHO MEAS - LVIDD: 4 CM
BH CV ECHO MEAS - LVIDS: 2.3 CM
BH CV ECHO MEAS - LVOT AREA (M): 2.8 CM^2
BH CV ECHO MEAS - LVOT AREA: 2.8 CM^2
BH CV ECHO MEAS - LVOT DIAM: 1.9 CM
BH CV ECHO MEAS - LVPWD: 1.1 CM
BH CV ECHO MEAS - MV A MAX VEL: 72.8 CM/SEC
BH CV ECHO MEAS - MV DEC SLOPE: 814 CM/SEC^2
BH CV ECHO MEAS - MV E MAX VEL: 130 CM/SEC
BH CV ECHO MEAS - MV E/A: 1.8
BH CV ECHO MEAS - MV MAX PG: 5.4 MMHG
BH CV ECHO MEAS - MV MEAN PG: 3 MMHG
BH CV ECHO MEAS - MV P1/2T MAX VEL: 116 CM/SEC
BH CV ECHO MEAS - MV P1/2T: 41.7 MSEC
BH CV ECHO MEAS - MV V2 MAX: 116 CM/SEC
BH CV ECHO MEAS - MV V2 MEAN: 77.8 CM/SEC
BH CV ECHO MEAS - MV V2 VTI: 29.9 CM
BH CV ECHO MEAS - MVA P1/2T LCG: 1.9 CM^2
BH CV ECHO MEAS - MVA(P1/2T): 5.3 CM^2
BH CV ECHO MEAS - MVA(VTI): 2.6 CM^2
BH CV ECHO MEAS - PA MAX PG: 3.9 MMHG
BH CV ECHO MEAS - PA V2 MAX: 99 CM/SEC
BH CV ECHO MEAS - RVDD: 2.4 CM
BH CV ECHO MEAS - SI(AO): 121.2 ML/M^2
BH CV ECHO MEAS - SI(CUBED): 27.6 ML/M^2
BH CV ECHO MEAS - SI(LVOT): 43.4 ML/M^2
BH CV ECHO MEAS - SI(TEICH): 27.8 ML/M^2
BH CV ECHO MEAS - SV(AO): 218.4 ML
BH CV ECHO MEAS - SV(CUBED): 49.8 ML
BH CV ECHO MEAS - SV(LVOT): 78.3 ML
BH CV ECHO MEAS - SV(TEICH): 50 ML
BH CV ECHO MEAS - TR MAX VEL: 296 CM/SEC
BUN BLD-MCNC: 10 MG/DL (ref 7–21)
BUN/CREAT SERPL: 15.6 (ref 7–25)
CALCIUM SPEC-SCNC: 8.8 MG/DL (ref 8.4–10.2)
CHLORIDE SERPL-SCNC: 102 MMOL/L (ref 95–110)
CO2 SERPL-SCNC: 25 MMOL/L (ref 22–31)
CREAT BLD-MCNC: 0.64 MG/DL (ref 0.5–1)
DEPRECATED RDW RBC AUTO: 46 FL (ref 36.4–46.3)
EOSINOPHIL # BLD AUTO: 0.02 10*3/MM3 (ref 0–0.7)
EOSINOPHIL NFR BLD AUTO: 0.2 % (ref 0–7)
ERYTHROCYTE [DISTWIDTH] IN BLOOD BY AUTOMATED COUNT: 13.8 % (ref 11.5–14.5)
GFR SERPL CREATININE-BSD FRML MDRD: 90 ML/MIN/1.73 (ref 39–90)
GLUCOSE BLD-MCNC: 111 MG/DL (ref 60–100)
HCT VFR BLD AUTO: 33.6 % (ref 35–45)
HGB BLD-MCNC: 11.3 G/DL (ref 12–15.5)
IMM GRANULOCYTES # BLD AUTO: 0.01 10*3/MM3 (ref 0–0.02)
IMM GRANULOCYTES NFR BLD AUTO: 0.1 % (ref 0–0.5)
LYMPHOCYTES # BLD AUTO: 2 10*3/MM3 (ref 0.6–4.2)
LYMPHOCYTES NFR BLD AUTO: 21.7 % (ref 10–50)
MAXIMAL PREDICTED HEART RATE: 144 BPM
MCH RBC QN AUTO: 30.6 PG (ref 26.5–34)
MCHC RBC AUTO-ENTMCNC: 33.6 G/DL (ref 31.4–36)
MCV RBC AUTO: 91.1 FL (ref 80–98)
MONOCYTES # BLD AUTO: 1.4 10*3/MM3 (ref 0–0.9)
MONOCYTES NFR BLD AUTO: 15.2 % (ref 0–12)
NEUTROPHILS # BLD AUTO: 5.77 10*3/MM3 (ref 2–8.6)
NEUTROPHILS NFR BLD AUTO: 62.5 % (ref 37–80)
PLATELET # BLD AUTO: 181 10*3/MM3 (ref 150–450)
PMV BLD AUTO: 10.4 FL (ref 8–12)
POTASSIUM BLD-SCNC: 3.6 MMOL/L (ref 3.5–5.1)
RBC # BLD AUTO: 3.69 10*6/MM3 (ref 3.77–5.16)
SODIUM BLD-SCNC: 137 MMOL/L (ref 137–145)
STRESS TARGET HR: 122 BPM
TROPONIN I SERPL-MCNC: <0.012 NG/ML
WBC NRBC COR # BLD: 9.23 10*3/MM3 (ref 3.2–9.8)

## 2018-12-27 PROCEDURE — 85025 COMPLETE CBC W/AUTO DIFF WBC: CPT | Performed by: NURSE PRACTITIONER

## 2018-12-27 PROCEDURE — 84484 ASSAY OF TROPONIN QUANT: CPT | Performed by: NURSE PRACTITIONER

## 2018-12-27 PROCEDURE — G0378 HOSPITAL OBSERVATION PER HR: HCPCS

## 2018-12-27 PROCEDURE — 93306 TTE W/DOPPLER COMPLETE: CPT

## 2018-12-27 PROCEDURE — 94799 UNLISTED PULMONARY SVC/PX: CPT

## 2018-12-27 PROCEDURE — 94760 N-INVAS EAR/PLS OXIMETRY 1: CPT

## 2018-12-27 PROCEDURE — 96361 HYDRATE IV INFUSION ADD-ON: CPT

## 2018-12-27 PROCEDURE — 80048 BASIC METABOLIC PNL TOTAL CA: CPT | Performed by: NURSE PRACTITIONER

## 2018-12-27 RX ORDER — ASPIRIN 81 MG/1
81 TABLET ORAL DAILY
Status: DISCONTINUED | OUTPATIENT
Start: 2018-12-27 | End: 2018-12-28 | Stop reason: HOSPADM

## 2018-12-27 RX ADMIN — SODIUM CHLORIDE 75 ML/HR: 900 INJECTION, SOLUTION INTRAVENOUS at 01:40

## 2018-12-27 RX ADMIN — ASPIRIN 81 MG: 81 TABLET, COATED ORAL at 12:57

## 2018-12-27 RX ADMIN — NITROGLYCERIN 1 INCH: 20 OINTMENT TOPICAL at 12:57

## 2018-12-27 RX ADMIN — PANTOPRAZOLE SODIUM 40 MG: 40 TABLET, DELAYED RELEASE ORAL at 20:14

## 2018-12-27 RX ADMIN — PANTOPRAZOLE SODIUM 40 MG: 40 TABLET, DELAYED RELEASE ORAL at 00:54

## 2018-12-27 RX ADMIN — NITROGLYCERIN 1 INCH: 20 OINTMENT TOPICAL at 05:59

## 2018-12-27 RX ADMIN — NEBIVOLOL HYDROCHLORIDE 5 MG: 5 TABLET ORAL at 09:09

## 2018-12-27 RX ADMIN — PANTOPRAZOLE SODIUM 40 MG: 40 TABLET, DELAYED RELEASE ORAL at 09:09

## 2018-12-28 ENCOUNTER — APPOINTMENT (OUTPATIENT)
Dept: NUCLEAR MEDICINE | Facility: HOSPITAL | Age: 78
End: 2018-12-28

## 2018-12-28 ENCOUNTER — APPOINTMENT (OUTPATIENT)
Dept: CT IMAGING | Facility: HOSPITAL | Age: 78
End: 2018-12-28

## 2018-12-28 ENCOUNTER — APPOINTMENT (OUTPATIENT)
Dept: CARDIOLOGY | Facility: HOSPITAL | Age: 78
End: 2018-12-28

## 2018-12-28 VITALS
SYSTOLIC BLOOD PRESSURE: 123 MMHG | DIASTOLIC BLOOD PRESSURE: 60 MMHG | HEART RATE: 69 BPM | HEIGHT: 62 IN | OXYGEN SATURATION: 96 % | RESPIRATION RATE: 18 BRPM | TEMPERATURE: 97.8 F | BODY MASS INDEX: 31.87 KG/M2 | WEIGHT: 173.2 LBS

## 2018-12-28 PROBLEM — J98.11 ATELECTASIS OF RIGHT LUNG: Status: ACTIVE | Noted: 2018-12-28

## 2018-12-28 PROBLEM — J18.9 RLL PNEUMONIA: Status: ACTIVE | Noted: 2018-12-28

## 2018-12-28 LAB
ANION GAP SERPL CALCULATED.3IONS-SCNC: 11 MMOL/L (ref 5–15)
BASOPHILS # BLD AUTO: 0.01 10*3/MM3 (ref 0–0.2)
BASOPHILS NFR BLD AUTO: 0.1 % (ref 0–2)
BH CV STRESS BP STAGE 1: NORMAL
BH CV STRESS COMMENTS STAGE 1: NORMAL
BH CV STRESS DOSE REGADENOSON STAGE 1: 0.4
BH CV STRESS DURATION MIN STAGE 1: 0
BH CV STRESS DURATION SEC STAGE 1: 10
BH CV STRESS HR STAGE 1: 68
BH CV STRESS PROTOCOL 1: NORMAL
BH CV STRESS RECOVERY BP: NORMAL MMHG
BH CV STRESS RECOVERY HR: 73 BPM
BH CV STRESS STAGE 1: 1
BUN BLD-MCNC: 9 MG/DL (ref 7–21)
BUN/CREAT SERPL: 13 (ref 7–25)
CALCIUM SPEC-SCNC: 9 MG/DL (ref 8.4–10.2)
CHLORIDE SERPL-SCNC: 104 MMOL/L (ref 95–110)
CO2 SERPL-SCNC: 24 MMOL/L (ref 22–31)
CREAT BLD-MCNC: 0.69 MG/DL (ref 0.5–1)
DEPRECATED RDW RBC AUTO: 45.1 FL (ref 36.4–46.3)
EOSINOPHIL # BLD AUTO: 0.13 10*3/MM3 (ref 0–0.7)
EOSINOPHIL NFR BLD AUTO: 1.7 % (ref 0–7)
ERYTHROCYTE [DISTWIDTH] IN BLOOD BY AUTOMATED COUNT: 13.6 % (ref 11.5–14.5)
GFR SERPL CREATININE-BSD FRML MDRD: 83 ML/MIN/1.73 (ref 39–90)
GLUCOSE BLD-MCNC: 92 MG/DL (ref 60–100)
HCT VFR BLD AUTO: 34.7 % (ref 35–45)
HGB BLD-MCNC: 11.6 G/DL (ref 12–15.5)
IMM GRANULOCYTES # BLD AUTO: 0.02 10*3/MM3 (ref 0–0.02)
IMM GRANULOCYTES NFR BLD AUTO: 0.3 % (ref 0–0.5)
LV EF NUC BP: 84 %
LYMPHOCYTES # BLD AUTO: 1.97 10*3/MM3 (ref 0.6–4.2)
LYMPHOCYTES NFR BLD AUTO: 25.8 % (ref 10–50)
MAXIMAL PREDICTED HEART RATE: 144 BPM
MCH RBC QN AUTO: 30.1 PG (ref 26.5–34)
MCHC RBC AUTO-ENTMCNC: 33.4 G/DL (ref 31.4–36)
MCV RBC AUTO: 90.1 FL (ref 80–98)
MONOCYTES # BLD AUTO: 1.24 10*3/MM3 (ref 0–0.9)
MONOCYTES NFR BLD AUTO: 16.3 % (ref 0–12)
NEUTROPHILS # BLD AUTO: 4.26 10*3/MM3 (ref 2–8.6)
NEUTROPHILS NFR BLD AUTO: 55.8 % (ref 37–80)
PERCENT MAX PREDICTED HR: 60.42 %
PLATELET # BLD AUTO: 190 10*3/MM3 (ref 150–450)
PMV BLD AUTO: 10.7 FL (ref 8–12)
POTASSIUM BLD-SCNC: 3.6 MMOL/L (ref 3.5–5.1)
RBC # BLD AUTO: 3.85 10*6/MM3 (ref 3.77–5.16)
SODIUM BLD-SCNC: 139 MMOL/L (ref 137–145)
STRESS BASELINE BP: NORMAL MMHG
STRESS BASELINE HR: 68 BPM
STRESS PERCENT HR: 71 %
STRESS POST ESTIMATED WORKLOAD: 1 METS
STRESS POST PEAK BP: NORMAL MMHG
STRESS POST PEAK HR: 87 BPM
STRESS TARGET HR: 122 BPM
WBC NRBC COR # BLD: 7.63 10*3/MM3 (ref 3.2–9.8)

## 2018-12-28 PROCEDURE — G0378 HOSPITAL OBSERVATION PER HR: HCPCS

## 2018-12-28 PROCEDURE — 25010000002 ONDANSETRON PER 1 MG: Performed by: NURSE PRACTITIONER

## 2018-12-28 PROCEDURE — 0 TECHNETIUM SESTAMIBI: Performed by: INTERNAL MEDICINE

## 2018-12-28 PROCEDURE — 71275 CT ANGIOGRAPHY CHEST: CPT

## 2018-12-28 PROCEDURE — 96375 TX/PRO/DX INJ NEW DRUG ADDON: CPT

## 2018-12-28 PROCEDURE — 85025 COMPLETE CBC W/AUTO DIFF WBC: CPT | Performed by: NURSE PRACTITIONER

## 2018-12-28 PROCEDURE — 80048 BASIC METABOLIC PNL TOTAL CA: CPT | Performed by: NURSE PRACTITIONER

## 2018-12-28 PROCEDURE — 25010000002 REGADENOSON 0.4 MG/5ML SOLUTION: Performed by: INTERNAL MEDICINE

## 2018-12-28 PROCEDURE — 78452 HT MUSCLE IMAGE SPECT MULT: CPT

## 2018-12-28 PROCEDURE — A9500 TC99M SESTAMIBI: HCPCS | Performed by: INTERNAL MEDICINE

## 2018-12-28 PROCEDURE — 0 IOPAMIDOL PER 1 ML: Performed by: INTERNAL MEDICINE

## 2018-12-28 PROCEDURE — 93017 CV STRESS TEST TRACING ONLY: CPT

## 2018-12-28 RX ORDER — 0.9 % SODIUM CHLORIDE 0.9 %
10 VIAL (ML) INJECTION AS NEEDED
Status: DISCONTINUED | OUTPATIENT
Start: 2018-12-28 | End: 2018-12-28 | Stop reason: HOSPADM

## 2018-12-28 RX ORDER — ASPIRIN 81 MG/1
81 TABLET ORAL DAILY
Qty: 30 TABLET | Refills: 0 | Status: SHIPPED | OUTPATIENT
Start: 2018-12-29 | End: 2020-07-18

## 2018-12-28 RX ORDER — AZITHROMYCIN 250 MG/1
TABLET, FILM COATED ORAL
Qty: 6 TABLET | Refills: 0 | Status: SHIPPED | OUTPATIENT
Start: 2018-12-28 | End: 2020-07-18

## 2018-12-28 RX ORDER — AZITHROMYCIN 250 MG/1
250 TABLET, FILM COATED ORAL DAILY
Qty: 4 TABLET | Refills: 0 | Status: SHIPPED | OUTPATIENT
Start: 2018-12-28 | End: 2018-12-28 | Stop reason: HOSPADM

## 2018-12-28 RX ADMIN — TECHNETIUM TC 99M SESTAMIBI 1 DOSE: 1 INJECTION INTRAVENOUS at 07:25

## 2018-12-28 RX ADMIN — ONDANSETRON 4 MG: 2 INJECTION INTRAMUSCULAR; INTRAVENOUS at 10:51

## 2018-12-28 RX ADMIN — ASPIRIN 81 MG: 81 TABLET, COATED ORAL at 10:44

## 2018-12-28 RX ADMIN — REGADENOSON 0.4 MG: 0.08 INJECTION, SOLUTION INTRAVENOUS at 10:22

## 2018-12-28 RX ADMIN — NEBIVOLOL HYDROCHLORIDE 5 MG: 5 TABLET ORAL at 10:44

## 2018-12-28 RX ADMIN — SODIUM CHLORIDE, PRESERVATIVE FREE 10 ML: 5 INJECTION INTRAVENOUS at 10:22

## 2018-12-28 RX ADMIN — IOPAMIDOL 66 ML: 755 INJECTION, SOLUTION INTRAVENOUS at 07:53

## 2018-12-28 RX ADMIN — TECHNETIUM TC 99M SESTAMIBI 1 DOSE: 1 INJECTION INTRAVENOUS at 10:22

## 2018-12-28 RX ADMIN — PANTOPRAZOLE SODIUM 40 MG: 40 TABLET, DELAYED RELEASE ORAL at 10:44

## 2020-07-18 PROBLEM — Z86.010 HISTORY OF COLONIC POLYPS: Status: ACTIVE | Noted: 2017-11-29

## 2020-07-18 PROBLEM — K26.9 DUODENAL ULCER: Status: ACTIVE | Noted: 2018-01-09

## 2020-07-18 PROBLEM — E03.9 ACQUIRED HYPOTHYROIDISM: Status: ACTIVE | Noted: 2017-11-29

## 2020-07-18 PROBLEM — Z87.898 HISTORY OF CHEST PAIN: Status: ACTIVE | Noted: 2019-01-04

## 2020-07-18 PROBLEM — Z87.01 HISTORY OF PNEUMONIA: Status: ACTIVE | Noted: 2019-01-04

## 2020-07-18 PROBLEM — R29.898 BILATERAL LEG WEAKNESS: Status: ACTIVE | Noted: 2018-10-03

## 2020-07-18 PROBLEM — J30.89 ENVIRONMENTAL AND SEASONAL ALLERGIES: Status: ACTIVE | Noted: 2020-05-08

## 2020-07-18 PROBLEM — F41.1 GAD (GENERALIZED ANXIETY DISORDER): Status: ACTIVE | Noted: 2017-12-29

## 2020-07-18 PROBLEM — K57.30 DIVERTICULOSIS OF COLON WITHOUT DIVERTICULITIS: Status: ACTIVE | Noted: 2017-11-29

## 2020-07-20 ENCOUNTER — LAB (OUTPATIENT)
Dept: LAB | Facility: HOSPITAL | Age: 80
End: 2020-07-20

## 2020-07-20 PROCEDURE — U0003 INFECTIOUS AGENT DETECTION BY NUCLEIC ACID (DNA OR RNA); SEVERE ACUTE RESPIRATORY SYNDROME CORONAVIRUS 2 (SARS-COV-2) (CORONAVIRUS DISEASE [COVID-19]), AMPLIFIED PROBE TECHNIQUE, MAKING USE OF HIGH THROUGHPUT TECHNOLOGIES AS DESCRIBED BY CMS-2020-01-R: HCPCS | Performed by: INTERNAL MEDICINE

## 2020-07-20 PROCEDURE — C9803 HOPD COVID-19 SPEC COLLECT: HCPCS | Performed by: INTERNAL MEDICINE

## 2020-07-21 LAB
COVID LABCORP PRIORITY: NORMAL
SARS-COV-2 RNA RESP QL NAA+PROBE: NOT DETECTED

## 2020-07-23 ENCOUNTER — ANESTHESIA EVENT (OUTPATIENT)
Dept: GASTROENTEROLOGY | Facility: HOSPITAL | Age: 80
End: 2020-07-23

## 2020-07-23 ENCOUNTER — ANESTHESIA (OUTPATIENT)
Dept: GASTROENTEROLOGY | Facility: HOSPITAL | Age: 80
End: 2020-07-23

## 2020-07-23 ENCOUNTER — HOSPITAL ENCOUNTER (OUTPATIENT)
Facility: HOSPITAL | Age: 80
Setting detail: HOSPITAL OUTPATIENT SURGERY
Discharge: HOME | End: 2020-07-23
Attending: INTERNAL MEDICINE | Admitting: INTERNAL MEDICINE

## 2020-07-23 VITALS
OXYGEN SATURATION: 98 % | WEIGHT: 175 LBS | BODY MASS INDEX: 32.2 KG/M2 | RESPIRATION RATE: 20 BRPM | HEART RATE: 78 BPM | HEIGHT: 62 IN | SYSTOLIC BLOOD PRESSURE: 172 MMHG | DIASTOLIC BLOOD PRESSURE: 79 MMHG | TEMPERATURE: 98 F

## 2020-07-23 DIAGNOSIS — K26.9 POSTPYLORIC ULCER: ICD-10-CM

## 2020-07-23 PROCEDURE — 93010 ELECTROCARDIOGRAM REPORT: CPT | Performed by: INTERNAL MEDICINE

## 2020-07-23 PROCEDURE — 87071 CULTURE AEROBIC QUANT OTHER: CPT | Performed by: INTERNAL MEDICINE

## 2020-07-23 PROCEDURE — 88305 TISSUE EXAM BY PATHOLOGIST: CPT

## 2020-07-23 PROCEDURE — 25010000002 PROPOFOL 10 MG/ML EMULSION: Performed by: NURSE ANESTHETIST, CERTIFIED REGISTERED

## 2020-07-23 PROCEDURE — 93005 ELECTROCARDIOGRAM TRACING: CPT | Performed by: INTERNAL MEDICINE

## 2020-07-23 RX ORDER — DEXTROSE AND SODIUM CHLORIDE 5; .45 G/100ML; G/100ML
30 INJECTION, SOLUTION INTRAVENOUS CONTINUOUS PRN
Status: DISCONTINUED | OUTPATIENT
Start: 2020-07-23 | End: 2020-07-23 | Stop reason: HOSPADM

## 2020-07-23 RX ORDER — PROPOFOL 10 MG/ML
VIAL (ML) INTRAVENOUS AS NEEDED
Status: DISCONTINUED | OUTPATIENT
Start: 2020-07-23 | End: 2020-07-23 | Stop reason: SURG

## 2020-07-23 RX ORDER — LIDOCAINE HYDROCHLORIDE 20 MG/ML
INJECTION, SOLUTION EPIDURAL; INFILTRATION; INTRACAUDAL; PERINEURAL AS NEEDED
Status: DISCONTINUED | OUTPATIENT
Start: 2020-07-23 | End: 2020-07-23 | Stop reason: SURG

## 2020-07-23 RX ADMIN — PROPOFOL 60 MG: 10 INJECTION, EMULSION INTRAVENOUS at 11:05

## 2020-07-23 RX ADMIN — PROPOFOL 20 MG: 10 INJECTION, EMULSION INTRAVENOUS at 11:09

## 2020-07-23 RX ADMIN — DEXTROSE AND SODIUM CHLORIDE 30 ML/HR: 5; 450 INJECTION, SOLUTION INTRAVENOUS at 10:17

## 2020-07-23 RX ADMIN — LIDOCAINE HYDROCHLORIDE 50 MG: 20 INJECTION, SOLUTION EPIDURAL; INFILTRATION; INTRACAUDAL; PERINEURAL at 11:03

## 2020-07-23 RX ADMIN — PROPOFOL 20 MG: 10 INJECTION, EMULSION INTRAVENOUS at 11:07

## 2020-07-23 NOTE — NURSING NOTE
Called pts  and let her know that pt is in preop and that we would call her when she gets to recovery

## 2020-07-23 NOTE — ANESTHESIA PREPROCEDURE EVALUATION
Anesthesia Evaluation     Patient summary reviewed and Nursing notes reviewed   NPO Solid Status: > 8 hours  NPO Liquid Status: > 2 hours           Airway   Mallampati: II  TM distance: >3 FB  Neck ROM: full  no difficulty expected  Dental    (+) upper dentures    Comment: Upper partial      Pulmonary - normal exam   (+) asthma,  Cardiovascular - normal exam    (+) hypertension well controlled,       Neuro/Psych  (+) dizziness/light headedness (begin paroxysmal positional vertigo),     GI/Hepatic/Renal/Endo    (+) obesity,  GERD well controlled, PUD,  renal disease stones,     Musculoskeletal (-) negative ROS    Abdominal  - normal exam   Substance History - negative use     OB/GYN negative ob/gyn ROS         Other - negative ROS                         Anesthesia Plan    ASA 3     MAC     intravenous induction     Anesthetic plan, all risks, benefits, and alternatives have been provided, discussed and informed consent has been obtained with: patient.    Plan discussed with CRNA.

## 2020-07-23 NOTE — ANESTHESIA POSTPROCEDURE EVALUATION
Patient: Hallie Milan    Procedure Summary     Date:  07/23/20 Room / Location:  [facility] ENDOSCOPY 2 / [facility] ENDOSCOPY    Anesthesia Start:  1100 Anesthesia Stop:  1113    Procedure:  ESOPHAGOGASTRODUODENOSCOPY (N/A ) Diagnosis:       Postpyloric ulcer      (Postpyloric ulcer [K26.9])    Surgeon:  Curtis Rick DO Provider:  Pallavi Wheeler CRNA    Anesthesia Type:  MAC ASA Status:  3          Anesthesia Type: MAC    Vitals  No vitals data found for the desired time range.          Post Anesthesia Care and Evaluation    Patient location during evaluation: bedside  Patient participation: complete - patient participated  Level of consciousness: awake  Pain score: 0  Pain management: adequate  Airway patency: patent  Anesthetic complications: No anesthetic complications  PONV Status: none  Cardiovascular status: acceptable and hemodynamically stable  Respiratory status: acceptable, spontaneous ventilation and nonlabored ventilation  Hydration status: acceptable    Comments: Patient having PAC's during procedure. Dr. Rick aware and will order post-op EKG

## 2020-07-23 NOTE — H&P
Kylee Garcia DO,[facility]  Gastroenterology  Hepatology  Endoscopy  Board Certified in Internal Medicine and gastroenterology  [address], [address]  [place]. [address]  - [phone]   F - [phone]     GASTROENTEROLOGY HISTORY AND PHYSICAL  NOTE   KYLEE GARCIA DO.         SUBJECTIVE:   7/23/2020    Name: Hallie Milan  DOD: 1940        Chief Complaint:       Subjective : Dyspepsia.  Personal history of duodenal ulcer    Patient is 80 y.o. female presents with desire for elective EGD with biopsy and culture.      ROS/HISTORY/ CURRENT MEDICATIONS/OBJECTIVE/VS/PE:   Review of Systems:  All systems unremarkable unless specified below.  Constitutional   HENT  Eyes   Respiratory    Cardiovascular  Gastrointestinal   Endocrine  Genitourinary    Musculoskeletal   Skin  Allergic/Immunologic    Neurological    Hematological  Psychiatric/Behavioral    History:     Past Medical History:   Diagnosis Date   • Allergic rhinitis    • Asthma    • Benign paroxysmal positional vertigo    • Chronic laryngitis    • Disease of thyroid gland    • Ear problem     ear dysfunction   • GERD (gastroesophageal reflux disease)    • Hypertension    • Ingrown toenail    • Kidney stone     Hx of   • Morgagni hernia    • Onychomycosis    • Rapid heart beat    • Temporomandibular joint disorder    • Ulcer    • Vertigo      Past Surgical History:   Procedure Laterality Date   • CHOLECYSTECTOMY     • COLONOSCOPY N/A 12/11/2017    Procedure: COLONOSCOPY;  Surgeon: Kylee Garcia DO;  Location: [facility] ENDOSCOPY;  Service:    • ENDOSCOPY N/A 12/11/2017    Procedure: ESOPHAGOGASTRODUODENOSCOPY;  Surgeon: Kylee Garcia DO;  Location: [facility] ENDOSCOPY;  Service:    • ENDOSCOPY N/A 2/8/2018    Procedure: ESOPHAGOGASTRODUODENOSCOPY;  Surgeon: Kylee Garcia DO;  Location: [facility] ENDOSCOPY;  Service:    • MOUTH SURGERY      Due to staph infection in lower lip     Family History   Problem Relation Age of Onset   •  Cancer Other    • Diabetes Other    • Heart disease Other    • Hypertension Other    • Stroke Other    • Thyroid disease Other    • Other Other         Colon Problems   • Cancer Mother    • Heart disease Mother    • Diabetes Mother    • Hypertension Mother    • Thyroid disease Mother    • Heart disease Father    • Cancer Sister    • Diabetes Sister    • Hypertension Sister    • Cancer Brother      Social History     Tobacco Use   • Smoking status: Never Smoker   • Smokeless tobacco: Never Used   Substance Use Topics   • Alcohol use: No   • Drug use: No     Prior to Admission medications    Medication Sig Start Date End Date Taking? Authorizing Provider   B COMPLEX VITAMINS PO Take 1,000 mcg by mouth 1 (One) Time Per Week.    Farzaneh Moore MD   budesonide (PULMICORT) 90 MCG/ACT inhaler Inhale 1 puff 2 (Two) Times a Day.    Farzaneh Moore MD   Coenzyme Q10 (COQ-10) 100 MG capsule Take  by mouth. 5/8/20   EmergencyNurse Elizondo RN   levothyroxine (SYNTHROID, LEVOTHROID) 25 MCG tablet TAKE 1/2 TABLET BY MOUTH EVERY MORNING BEFORE BREAKFAST 8/1/19   Emergency, Nurse Caro RN   Magnesium 100 MG capsule Take 400 mg by mouth Daily.    Nurse Caro Carter RN   MAGNESIUM GLUCONATE PO Take 250 mg by mouth Daily.    Farzaneh Moore MD   meclizine (ANTIVERT) 25 MG tablet Take 12.5-25 mg by mouth 3 (Three) Times a Day As Needed for dizziness.    Farzaneh Moore MD   nebivolol (BYSTOLIC) 5 MG tablet Take 5 mg by mouth Daily.    Farzaneh Moore MD   Omega-3 Fatty Acids (FISH OIL) 500 MG capsule Take 900 mg by mouth 2 (Two) Times a Day.    Farzaneh Moore MD   pantoprazole (PROTONIX) 40 MG EC tablet Take 40 mg by mouth 2 (Two) Times a Day.    Farzaneh Moore MD   rosuvastatin (CRESTOR) 5 MG tablet Take 5 mg by mouth Every Evening. 5/7/20   Emergency, Nurse Elizondo RN   sucralfate (Carafate) 1 g tablet Take 1 tablet by mouth Every 8 (Eight) Hours. 7/6/20   EmergencyNurse Elizondo RN  "  triamcinolone (KENALOG) 0.1 % ointment Apply  topically to the appropriate area as directed 2 (Two) Times a Day for 10 days. Large tube please 7/18/20 7/28/20  Jo Argueta APRN     Allergies:  Codeine; Minoxidil; Claritin-d 12 hour [loratadine-pseudoephedrine er]; and Prednisone    I have reviewed the patients medical history, surgical history and family history in the available medical record system.     Current Medications:     No current facility-administered medications for this encounter.        Objective     Physical Exam:     /79 (Patient Position: Lying)   Pulse 78   Temp 98 °F (36.7 °C) (Temporal)   Resp 20   Ht 157.5 cm (62\")   Wt 79.4 kg (175 lb)   SpO2 98%   BMI 32.01 kg/m²     Physical Exam:  General Appearance:    Alert, cooperative, in no acute distress   Head:    Normocephalic, without obvious abnormality, atraumatic   Eyes:            Lids and lashes normal, conjunctivae and sclerae normal, no   icterus, no pallor, corneas clear, PERRLA   Ears:    Ears appear intact with no abnormalities noted   Throat:   No oral lesions, no thrush, oral mucosa moist   Neck:   No adenopathy, supple, trachea midline, no thyromegaly, no     carotid bruit, no JVD   Back:     No kyphosis present, no scoliosis present, no skin lesions,       erythema or scars, no tenderness to percussion or                   palpation,   range of motion normal   Lungs:     Clear to auscultation,respirations regular, even and                   unlabored    Heart:    Regular rhythm and normal rate, normal S1 and S2, no            murmur, no gallop, no rub, no click   Breast Exam:    Deferred   Abdomen:     Normal bowel sounds, no masses, no organomegaly, soft        non-tender, non-distended, no guarding, no rebound                 tenderness   Genitalia:    Deferred   Extremities:   Moves all extremities well, no edema, no cyanosis, no              redness   Pulses:   Pulses palpable and equal bilaterally   Skin:   No " bleeding, bruising or rash   Lymph nodes:   No palpable adenopathy   Neurologic:   Cranial nerves 2 - 12 grossly intact, sensation intact, DTR        present and equal bilaterally      Results Review:     Lab Results   Component Value Date    WBC 6.2 01/31/2019    WBC 7.63 12/28/2018    WBC 9.23 12/27/2018    HGB 12.5 (L) 01/31/2019    HGB 11.6 (L) 12/28/2018    HGB 11.3 (L) 12/27/2018    HCT 38.9 01/31/2019    HCT 34.7 (L) 12/28/2018    HCT 33.6 (L) 12/27/2018     01/31/2019     12/28/2018     12/27/2018             No results found for: LIPASE  Lab Results   Component Value Date    INR 1.10 12/26/2018    INR 1.0 10/10/2016    INR 1.0 10/09/2016     No results found for: CULTURE    Radiology Review:  Imaging Results (Last 72 Hours)     ** No results found for the last 72 hours. **           I reviewed the patient's new clinical results.  I reviewed the patient's new imaging results and agree with the interpretation.     ASSESSMENT/PLAN:   ASSESSMENT:  1.  Dyspepsia  2.  Irritable bowel syndrome  3.  Duodenal ulcer    PLAN:  1.  Esophagogastroduodenoscopy with biopsy and culture    Risk and benefits associated with the procedure are reviewed with the patient.  The patient wished to proceed     Curtis Rick DO  07/23/20  09:46

## 2020-07-24 LAB
LAB AP CASE REPORT: NORMAL
PATH REPORT.FINAL DX SPEC: NORMAL

## 2020-07-25 LAB — BACTERIA SPEC AEROBE CULT: ABNORMAL

## 2021-01-25 ENCOUNTER — IMMUNIZATION (OUTPATIENT)
Dept: VACCINE CLINIC | Facility: HOSPITAL | Age: 81
End: 2021-01-25

## 2021-01-25 PROCEDURE — 0001A: CPT | Performed by: NURSE PRACTITIONER

## 2021-01-25 PROCEDURE — 91300 HC SARSCOV02 VAC 30MCG/0.3ML IM: CPT | Performed by: NURSE PRACTITIONER

## 2021-02-15 ENCOUNTER — APPOINTMENT (OUTPATIENT)
Dept: VACCINE CLINIC | Facility: HOSPITAL | Age: 81
End: 2021-02-15

## 2021-02-24 ENCOUNTER — IMMUNIZATION (OUTPATIENT)
Dept: VACCINE CLINIC | Facility: HOSPITAL | Age: 81
End: 2021-02-24

## 2021-02-24 PROCEDURE — 91300 HC SARSCOV02 VAC 30MCG/0.3ML IM: CPT | Performed by: THORACIC SURGERY (CARDIOTHORACIC VASCULAR SURGERY)

## 2021-02-24 PROCEDURE — 0002A: CPT | Performed by: THORACIC SURGERY (CARDIOTHORACIC VASCULAR SURGERY)

## 2021-10-10 ENCOUNTER — APPOINTMENT (OUTPATIENT)
Dept: CT IMAGING | Facility: HOSPITAL | Age: 81
End: 2021-10-10

## 2021-10-10 ENCOUNTER — HOSPITAL ENCOUNTER (OUTPATIENT)
Facility: HOSPITAL | Age: 81
Setting detail: OBSERVATION
Discharge: HOME | End: 2021-10-12
Attending: FAMILY MEDICINE | Admitting: INTERNAL MEDICINE

## 2021-10-10 ENCOUNTER — APPOINTMENT (OUTPATIENT)
Dept: GENERAL RADIOLOGY | Facility: HOSPITAL | Age: 81
End: 2021-10-10

## 2021-10-10 DIAGNOSIS — R07.9 CHEST PAIN, UNSPECIFIED TYPE: Primary | ICD-10-CM

## 2021-10-10 DIAGNOSIS — I10 BENIGN HYPERTENSION: ICD-10-CM

## 2021-10-10 LAB
ALBUMIN SERPL-MCNC: 4.6 G/DL (ref 3.5–5.2)
ALBUMIN/GLOB SERPL: 1.3 G/DL
ALP SERPL-CCNC: 54 U/L (ref 39–117)
ALT SERPL W P-5'-P-CCNC: 24 U/L (ref 1–33)
ANION GAP SERPL CALCULATED.3IONS-SCNC: 11 MMOL/L (ref 5–15)
ANION GAP SERPL CALCULATED.3IONS-SCNC: 16 MMOL/L (ref 5–15)
AST SERPL-CCNC: 27 U/L (ref 1–32)
BACTERIA UR QL AUTO: ABNORMAL /HPF
BASOPHILS # BLD AUTO: 0.03 10*3/MM3 (ref 0–0.2)
BASOPHILS NFR BLD AUTO: 0.3 % (ref 0–1.5)
BILIRUB SERPL-MCNC: 1 MG/DL (ref 0–1.2)
BILIRUB UR QL STRIP: ABNORMAL
BUN SERPL-MCNC: 11 MG/DL (ref 8–23)
BUN SERPL-MCNC: 12 MG/DL (ref 8–23)
BUN/CREAT SERPL: 14.1 (ref 7–25)
BUN/CREAT SERPL: 14.5 (ref 7–25)
CALCIUM SPEC-SCNC: 9.6 MG/DL (ref 8.6–10.5)
CALCIUM SPEC-SCNC: 9.8 MG/DL (ref 8.6–10.5)
CHLORIDE SERPL-SCNC: 100 MMOL/L (ref 98–107)
CHLORIDE SERPL-SCNC: 101 MMOL/L (ref 98–107)
CK SERPL-CCNC: 47 U/L (ref 20–180)
CLARITY UR: ABNORMAL
CO2 SERPL-SCNC: 20 MMOL/L (ref 22–29)
CO2 SERPL-SCNC: 24 MMOL/L (ref 22–29)
COLOR UR: YELLOW
CREAT SERPL-MCNC: 0.78 MG/DL (ref 0.57–1)
CREAT SERPL-MCNC: 0.83 MG/DL (ref 0.57–1)
D-DIMER, QUANTITATIVE (MAD,POW, STR): 1219 NG/ML (FEU) (ref 0–470)
DEPRECATED RDW RBC AUTO: 46.9 FL (ref 37–54)
EOSINOPHIL # BLD AUTO: 0.01 10*3/MM3 (ref 0–0.4)
EOSINOPHIL NFR BLD AUTO: 0.1 % (ref 0.3–6.2)
ERYTHROCYTE [DISTWIDTH] IN BLOOD BY AUTOMATED COUNT: 14 % (ref 12.3–15.4)
FLUAV SUBTYP SPEC NAA+PROBE: NOT DETECTED
FLUBV RNA ISLT QL NAA+PROBE: NOT DETECTED
GFR SERPL CREATININE-BSD FRML MDRD: 66 ML/MIN/1.73
GFR SERPL CREATININE-BSD FRML MDRD: 71 ML/MIN/1.73
GLOBULIN UR ELPH-MCNC: 3.5 GM/DL
GLUCOSE SERPL-MCNC: 115 MG/DL (ref 65–99)
GLUCOSE SERPL-MCNC: 120 MG/DL (ref 65–99)
GLUCOSE UR STRIP-MCNC: NEGATIVE MG/DL
HCT VFR BLD AUTO: 37.2 % (ref 34–46.6)
HGB BLD-MCNC: 12 G/DL (ref 12–15.9)
HGB UR QL STRIP.AUTO: ABNORMAL
HOLD SPECIMEN: NORMAL
HYALINE CASTS UR QL AUTO: ABNORMAL /LPF
IMM GRANULOCYTES # BLD AUTO: 0.04 10*3/MM3 (ref 0–0.05)
IMM GRANULOCYTES NFR BLD AUTO: 0.4 % (ref 0–0.5)
KETONES UR QL STRIP: ABNORMAL
LEUKOCYTE ESTERASE UR QL STRIP.AUTO: ABNORMAL
LYMPHOCYTES # BLD AUTO: 1.79 10*3/MM3 (ref 0.7–3.1)
LYMPHOCYTES NFR BLD AUTO: 15.9 % (ref 19.6–45.3)
MCH RBC QN AUTO: 29.3 PG (ref 26.6–33)
MCHC RBC AUTO-ENTMCNC: 32.3 G/DL (ref 31.5–35.7)
MCV RBC AUTO: 91 FL (ref 79–97)
MONOCYTES # BLD AUTO: 1.78 10*3/MM3 (ref 0.1–0.9)
MONOCYTES NFR BLD AUTO: 15.9 % (ref 5–12)
NEUTROPHILS NFR BLD AUTO: 67.4 % (ref 42.7–76)
NEUTROPHILS NFR BLD AUTO: 7.58 10*3/MM3 (ref 1.7–7)
NITRITE UR QL STRIP: NEGATIVE
NRBC BLD AUTO-RTO: 0 /100 WBC (ref 0–0.2)
NT-PROBNP SERPL-MCNC: 1373 PG/ML (ref 0–1800)
NT-PROBNP SERPL-MCNC: 1588 PG/ML (ref 0–1800)
PH UR STRIP.AUTO: 6.5 [PH] (ref 5–9)
PHOSPHATE SERPL-MCNC: 3 MG/DL (ref 2.5–4.5)
PLATELET # BLD AUTO: 192 10*3/MM3 (ref 140–450)
PMV BLD AUTO: 10.2 FL (ref 6–12)
POTASSIUM SERPL-SCNC: 4.2 MMOL/L (ref 3.5–5.2)
POTASSIUM SERPL-SCNC: 4.2 MMOL/L (ref 3.5–5.2)
PROT SERPL-MCNC: 8.1 G/DL (ref 6–8.5)
PROT UR QL STRIP: ABNORMAL
RBC # BLD AUTO: 4.09 10*6/MM3 (ref 3.77–5.28)
RBC # UR: ABNORMAL /HPF
REF LAB TEST METHOD: ABNORMAL
SARS-COV-2 RNA PNL SPEC NAA+PROBE: NOT DETECTED
SODIUM SERPL-SCNC: 136 MMOL/L (ref 136–145)
SODIUM SERPL-SCNC: 136 MMOL/L (ref 136–145)
SP GR UR STRIP: 1.02 (ref 1–1.03)
SQUAMOUS #/AREA URNS HPF: ABNORMAL /HPF
TROPONIN T SERPL-MCNC: <0.01 NG/ML (ref 0–0.03)
UROBILINOGEN UR QL STRIP: ABNORMAL
WBC # BLD AUTO: 11.23 10*3/MM3 (ref 3.4–10.8)
WBC UR QL AUTO: ABNORMAL /HPF

## 2021-10-10 PROCEDURE — C9803 HOPD COVID-19 SPEC COLLECT: HCPCS

## 2021-10-10 PROCEDURE — 87636 SARSCOV2 & INF A&B AMP PRB: CPT | Performed by: NURSE PRACTITIONER

## 2021-10-10 PROCEDURE — 25010000002 MORPHINE PER 10 MG: Performed by: NURSE PRACTITIONER

## 2021-10-10 PROCEDURE — 85379 FIBRIN DEGRADATION QUANT: CPT | Performed by: NURSE PRACTITIONER

## 2021-10-10 PROCEDURE — 25010000002 HYDRALAZINE PER 20 MG: Performed by: NURSE PRACTITIONER

## 2021-10-10 PROCEDURE — 71045 X-RAY EXAM CHEST 1 VIEW: CPT

## 2021-10-10 PROCEDURE — 83880 ASSAY OF NATRIURETIC PEPTIDE: CPT | Performed by: STUDENT IN AN ORGANIZED HEALTH CARE EDUCATION/TRAINING PROGRAM

## 2021-10-10 PROCEDURE — G0378 HOSPITAL OBSERVATION PER HR: HCPCS

## 2021-10-10 PROCEDURE — 80053 COMPREHEN METABOLIC PANEL: CPT | Performed by: NURSE PRACTITIONER

## 2021-10-10 PROCEDURE — 83880 ASSAY OF NATRIURETIC PEPTIDE: CPT | Performed by: NURSE PRACTITIONER

## 2021-10-10 PROCEDURE — 36415 COLL VENOUS BLD VENIPUNCTURE: CPT | Performed by: STUDENT IN AN ORGANIZED HEALTH CARE EDUCATION/TRAINING PROGRAM

## 2021-10-10 PROCEDURE — 93010 ELECTROCARDIOGRAM REPORT: CPT | Performed by: INTERNAL MEDICINE

## 2021-10-10 PROCEDURE — 94640 AIRWAY INHALATION TREATMENT: CPT

## 2021-10-10 PROCEDURE — 81001 URINALYSIS AUTO W/SCOPE: CPT | Performed by: NURSE PRACTITIONER

## 2021-10-10 PROCEDURE — 85025 COMPLETE CBC W/AUTO DIFF WBC: CPT | Performed by: NURSE PRACTITIONER

## 2021-10-10 PROCEDURE — 93005 ELECTROCARDIOGRAM TRACING: CPT | Performed by: NURSE PRACTITIONER

## 2021-10-10 PROCEDURE — 25010000002 ONDANSETRON PER 1 MG: Performed by: NURSE PRACTITIONER

## 2021-10-10 PROCEDURE — 99285 EMERGENCY DEPT VISIT HI MDM: CPT

## 2021-10-10 PROCEDURE — 94760 N-INVAS EAR/PLS OXIMETRY 1: CPT

## 2021-10-10 PROCEDURE — 96375 TX/PRO/DX INJ NEW DRUG ADDON: CPT

## 2021-10-10 PROCEDURE — 93005 ELECTROCARDIOGRAM TRACING: CPT | Performed by: FAMILY MEDICINE

## 2021-10-10 PROCEDURE — 84484 ASSAY OF TROPONIN QUANT: CPT | Performed by: NURSE PRACTITIONER

## 2021-10-10 PROCEDURE — 82550 ASSAY OF CK (CPK): CPT | Performed by: NURSE PRACTITIONER

## 2021-10-10 PROCEDURE — 93005 ELECTROCARDIOGRAM TRACING: CPT

## 2021-10-10 PROCEDURE — 71275 CT ANGIOGRAPHY CHEST: CPT

## 2021-10-10 PROCEDURE — 84100 ASSAY OF PHOSPHORUS: CPT | Performed by: STUDENT IN AN ORGANIZED HEALTH CARE EDUCATION/TRAINING PROGRAM

## 2021-10-10 PROCEDURE — 96374 THER/PROPH/DIAG INJ IV PUSH: CPT

## 2021-10-10 PROCEDURE — 84484 ASSAY OF TROPONIN QUANT: CPT | Performed by: STUDENT IN AN ORGANIZED HEALTH CARE EDUCATION/TRAINING PROGRAM

## 2021-10-10 PROCEDURE — 0 IOPAMIDOL PER 1 ML: Performed by: FAMILY MEDICINE

## 2021-10-10 RX ORDER — HYDRALAZINE HYDROCHLORIDE 20 MG/ML
10 INJECTION INTRAMUSCULAR; INTRAVENOUS ONCE
Status: COMPLETED | OUTPATIENT
Start: 2021-10-10 | End: 2021-10-10

## 2021-10-10 RX ORDER — MORPHINE SULFATE 2 MG/ML
2 INJECTION, SOLUTION INTRAMUSCULAR; INTRAVENOUS ONCE
Status: COMPLETED | OUTPATIENT
Start: 2021-10-10 | End: 2021-10-10

## 2021-10-10 RX ORDER — SODIUM CHLORIDE 0.9 % (FLUSH) 0.9 %
10 SYRINGE (ML) INJECTION AS NEEDED
Status: DISCONTINUED | OUTPATIENT
Start: 2021-10-10 | End: 2021-10-12 | Stop reason: HOSPADM

## 2021-10-10 RX ORDER — CALCIUM CARBONATE 200(500)MG
2 TABLET,CHEWABLE ORAL 2 TIMES DAILY PRN
Status: DISCONTINUED | OUTPATIENT
Start: 2021-10-10 | End: 2021-10-12 | Stop reason: HOSPADM

## 2021-10-10 RX ORDER — BUDESONIDE 0.5 MG/2ML
0.5 INHALANT ORAL
Status: DISCONTINUED | OUTPATIENT
Start: 2021-10-10 | End: 2021-10-12 | Stop reason: HOSPADM

## 2021-10-10 RX ORDER — ONDANSETRON 2 MG/ML
4 INJECTION INTRAMUSCULAR; INTRAVENOUS EVERY 6 HOURS PRN
Status: DISCONTINUED | OUTPATIENT
Start: 2021-10-10 | End: 2021-10-12 | Stop reason: HOSPADM

## 2021-10-10 RX ORDER — ACETAMINOPHEN 325 MG/1
650 TABLET ORAL EVERY 6 HOURS PRN
Status: DISCONTINUED | OUTPATIENT
Start: 2021-10-10 | End: 2021-10-12 | Stop reason: HOSPADM

## 2021-10-10 RX ORDER — POTASSIUM CHLORIDE 750 MG/1
40 CAPSULE, EXTENDED RELEASE ORAL AS NEEDED
Status: DISCONTINUED | OUTPATIENT
Start: 2021-10-10 | End: 2021-10-12 | Stop reason: HOSPADM

## 2021-10-10 RX ORDER — ROSUVASTATIN CALCIUM 5 MG/1
5 TABLET, COATED ORAL NIGHTLY
Status: DISCONTINUED | OUTPATIENT
Start: 2021-10-10 | End: 2021-10-12 | Stop reason: HOSPADM

## 2021-10-10 RX ORDER — PANTOPRAZOLE SODIUM 40 MG/1
40 TABLET, DELAYED RELEASE ORAL
Status: DISCONTINUED | OUTPATIENT
Start: 2021-10-10 | End: 2021-10-12 | Stop reason: HOSPADM

## 2021-10-10 RX ORDER — MAGNESIUM SULFATE HEPTAHYDRATE 40 MG/ML
2 INJECTION, SOLUTION INTRAVENOUS AS NEEDED
Status: DISCONTINUED | OUTPATIENT
Start: 2021-10-10 | End: 2021-10-12 | Stop reason: HOSPADM

## 2021-10-10 RX ORDER — MORPHINE SULFATE 2 MG/ML
2 INJECTION, SOLUTION INTRAMUSCULAR; INTRAVENOUS EVERY 4 HOURS PRN
Status: DISCONTINUED | OUTPATIENT
Start: 2021-10-10 | End: 2021-10-12 | Stop reason: HOSPADM

## 2021-10-10 RX ORDER — MAGNESIUM SULFATE HEPTAHYDRATE 40 MG/ML
4 INJECTION, SOLUTION INTRAVENOUS AS NEEDED
Status: DISCONTINUED | OUTPATIENT
Start: 2021-10-10 | End: 2021-10-12 | Stop reason: HOSPADM

## 2021-10-10 RX ORDER — NEBIVOLOL 5 MG/1
5 TABLET ORAL DAILY
Status: DISCONTINUED | OUTPATIENT
Start: 2021-10-11 | End: 2021-10-12 | Stop reason: HOSPADM

## 2021-10-10 RX ORDER — SUCRALFATE 1 G/1
1 TABLET ORAL EVERY 8 HOURS
Status: DISCONTINUED | OUTPATIENT
Start: 2021-10-10 | End: 2021-10-12 | Stop reason: HOSPADM

## 2021-10-10 RX ORDER — SODIUM CHLORIDE 0.9 % (FLUSH) 0.9 %
10 SYRINGE (ML) INJECTION EVERY 12 HOURS SCHEDULED
Status: DISCONTINUED | OUTPATIENT
Start: 2021-10-10 | End: 2021-10-10

## 2021-10-10 RX ORDER — HYDROCODONE BITARTRATE AND ACETAMINOPHEN 5; 325 MG/1; MG/1
1 TABLET ORAL EVERY 6 HOURS PRN
Status: DISCONTINUED | OUTPATIENT
Start: 2021-10-10 | End: 2021-10-12 | Stop reason: HOSPADM

## 2021-10-10 RX ORDER — ONDANSETRON 2 MG/ML
4 INJECTION INTRAMUSCULAR; INTRAVENOUS ONCE
Status: COMPLETED | OUTPATIENT
Start: 2021-10-10 | End: 2021-10-10

## 2021-10-10 RX ORDER — MECLIZINE HYDROCHLORIDE 25 MG/1
25 TABLET ORAL 3 TIMES DAILY PRN
Status: DISCONTINUED | OUTPATIENT
Start: 2021-10-10 | End: 2021-10-12 | Stop reason: HOSPADM

## 2021-10-10 RX ORDER — LEVOTHYROXINE SODIUM 0.03 MG/1
25 TABLET ORAL
Status: DISCONTINUED | OUTPATIENT
Start: 2021-10-11 | End: 2021-10-12 | Stop reason: HOSPADM

## 2021-10-10 RX ORDER — POTASSIUM CHLORIDE 1.5 G/1.77G
40 POWDER, FOR SOLUTION ORAL AS NEEDED
Status: DISCONTINUED | OUTPATIENT
Start: 2021-10-10 | End: 2021-10-12 | Stop reason: HOSPADM

## 2021-10-10 RX ADMIN — HYDROCODONE BITARTRATE AND ACETAMINOPHEN 1 TABLET: 5; 325 TABLET ORAL at 19:29

## 2021-10-10 RX ADMIN — MECLIZINE HYDROCHLORIDE 25 MG: 25 TABLET ORAL at 21:49

## 2021-10-10 RX ADMIN — IOPAMIDOL 58 ML: 755 INJECTION, SOLUTION INTRAVENOUS at 13:30

## 2021-10-10 RX ADMIN — ONDANSETRON 4 MG: 2 INJECTION INTRAMUSCULAR; INTRAVENOUS at 11:48

## 2021-10-10 RX ADMIN — PANTOPRAZOLE SODIUM 40 MG: 40 TABLET, DELAYED RELEASE ORAL at 21:48

## 2021-10-10 RX ADMIN — HYDRALAZINE HYDROCHLORIDE 10 MG: 20 INJECTION INTRAMUSCULAR; INTRAVENOUS at 14:48

## 2021-10-10 RX ADMIN — MORPHINE SULFATE 2 MG: 2 INJECTION, SOLUTION INTRAMUSCULAR; INTRAVENOUS at 11:47

## 2021-10-10 RX ADMIN — ROSUVASTATIN CALCIUM 5 MG: 5 TABLET, FILM COATED ORAL at 21:48

## 2021-10-10 RX ADMIN — BUDESONIDE 0.5 MG: 0.5 INHALANT RESPIRATORY (INHALATION) at 19:59

## 2021-10-10 NOTE — ED NOTES
Patient presents to ED with c/o having chest tightness and shortness of air since yesterday around 1600 after she picked pears. Patient does have a history of asthma. Denies covid exposure     Knight, Letitia, RN  10/10/21 [id number]

## 2021-10-10 NOTE — ED PROVIDER NOTES
"Subjective   Patient presents to the ER with c/o bilateral chest pain that started yesterday after picking pears. She states states this morning pain was worsen and she went to the .  saw some subtle changes to patient's EKG and sent patient here. Patient states pain is bilateral but puts hand to mid chest when pointing at location of pain. C/O shortness of breath and pain when breathing. Pain is also worse with palpation of \"certain\" spots and movement.           Review of Systems   Constitutional: Negative.    Respiratory: Positive for shortness of breath. Negative for cough.    Cardiovascular: Positive for chest pain. Negative for palpitations.   Gastrointestinal: Negative.    Genitourinary: Negative.    Skin: Negative.    Neurological: Negative.    Psychiatric/Behavioral: Negative.        Past Medical History:   Diagnosis Date   • Allergic rhinitis    • Benign paroxysmal positional vertigo    • Chronic laryngitis    • GERD (gastroesophageal reflux disease)    • Hypertension    • Hypothyroidism    • Nephrolithiasis    • Temporomandibular joint disorder    • Vertigo        Allergies   Allergen Reactions   • Codeine Other (See Comments)     oversedation   • Minoxidil Unknown - High Severity     Made her heart feel funny    • Claritin-D 12 Hour [Loratadine-Pseudoephedrine Er] Anxiety and Palpitations   • Prednisone Anxiety and Palpitations       Past Surgical History:   Procedure Laterality Date   • CHOLECYSTECTOMY     • COLONOSCOPY N/A 12/11/2017   • ENDOSCOPY N/A 12/11/2017   • ENDOSCOPY N/A 2/8/2018   • ENDOSCOPY N/A 7/23/2020       Family History   Problem Relation Age of Onset   • Hypertension Mother    • Heart disease Father    • Cancer Brother        Social History     Socioeconomic History   • Marital status:    Tobacco Use   • Smoking status: Never Smoker   • Smokeless tobacco: Never Used   Substance and Sexual Activity   • Alcohol use: No   • Drug use: No   • Sexual activity: Not Currently " "          Objective    BP 95/51 (BP Location: Right arm, Patient Position: Lying)   Pulse 83   Temp 98.4 °F (36.9 °C) (Temporal)   Resp 18   Ht 161.3 cm (63.5\")   Wt 80.4 kg (177 lb 4 oz)   SpO2 97%   BMI 30.90 kg/m²     Physical Exam  Vitals and nursing note reviewed.   Constitutional:       General: She is not in acute distress.     Appearance: She is well-developed.   HENT:      Head: Normocephalic and atraumatic.   Cardiovascular:      Rate and Rhythm: Normal rate and regular rhythm.      Heart sounds: Normal heart sounds. No murmur heard.      Pulmonary:      Effort: Pulmonary effort is normal. No respiratory distress.      Breath sounds: Normal breath sounds. No wheezing.   Chest:       Abdominal:      General: Bowel sounds are normal. There is no distension.      Palpations: Abdomen is soft.      Tenderness: There is no abdominal tenderness.   Musculoskeletal:         General: Normal range of motion.      Cervical back: Normal range of motion and neck supple.   Skin:     General: Skin is warm and dry.      Capillary Refill: Capillary refill takes less than 2 seconds.   Neurological:      Mental Status: She is alert and oriented to person, place, and time.      Coordination: Coordination normal.   Psychiatric:         Behavior: Behavior normal.         Thought Content: Thought content normal.         Judgment: Judgment normal.         ECG 12 Lead      Date/Time: 10/10/2021 5:02 PM  Performed by: Aicha Luna APRN  Authorized by: Aicha Luna APRN   Interpreted by physician  Comparison: compared with previous ECG   Rhythm: sinus bradycardia  Rate: bradycardic  BPM: 59  Comments: Left axis deviation  Pulmonary disease patter  Minimal volage criteria for LVH        Results for orders placed or performed during the hospital encounter of 10/10/21   COVID-19 and FLU A/B PCR - Swab, Nasopharynx    Specimen: Nasopharynx; Swab   Result Value Ref Range    COVID19 Not Detected Not Detected - " Ref. Range    Influenza A PCR Not Detected Not Detected    Influenza B PCR Not Detected Not Detected   Comprehensive Metabolic Panel    Specimen: Blood   Result Value Ref Range    Glucose 120 (H) 65 - 99 mg/dL    BUN 11 8 - 23 mg/dL    Creatinine 0.78 0.57 - 1.00 mg/dL    Sodium 136 136 - 145 mmol/L    Potassium 4.2 3.5 - 5.2 mmol/L    Chloride 101 98 - 107 mmol/L    CO2 24.0 22.0 - 29.0 mmol/L    Calcium 9.6 8.6 - 10.5 mg/dL    Total Protein 8.1 6.0 - 8.5 g/dL    Albumin 4.60 3.50 - 5.20 g/dL    ALT (SGPT) 24 1 - 33 U/L    AST (SGOT) 27 1 - 32 U/L    Alkaline Phosphatase 54 39 - 117 U/L    Total Bilirubin 1.0 0.0 - 1.2 mg/dL    eGFR Non African Amer 71 >60 mL/min/1.73    Globulin 3.5 gm/dL    A/G Ratio 1.3 g/dL    BUN/Creatinine Ratio 14.1 7.0 - 25.0    Anion Gap 11.0 5.0 - 15.0 mmol/L   Urinalysis With Microscopic If Indicated (No Culture) - Urine, Clean Catch    Specimen: Urine, Clean Catch   Result Value Ref Range    Color, UA Yellow Yellow, Straw, Dark Yellow, Estela    Appearance, UA Cloudy (A) Clear    pH, UA 6.5 5.0 - 9.0    Specific Gravity, UA 1.024 1.003 - 1.030    Glucose, UA Negative Negative    Ketones, UA Trace (A) Negative    Bilirubin, UA Small (1+) (A) Negative    Blood, UA Small (1+) (A) Negative    Protein, UA Trace (A) Negative    Leuk Esterase, UA Large (3+) (A) Negative    Nitrite, UA Negative Negative    Urobilinogen, UA 0.2 E.U./dL 0.2 - 1.0 E.U./dL   BNP    Specimen: Blood   Result Value Ref Range    proBNP 1,373.0 0.0-1,800.0 pg/mL   Troponin    Specimen: Blood   Result Value Ref Range    Troponin T <0.010 0.000 - 0.030 ng/mL   D-dimer, Quantitative    Specimen: Blood   Result Value Ref Range    D-Dimer, Quantitative 1,219 (H) 0 - 470 ng/mL (FEU)   CK    Specimen: Blood   Result Value Ref Range    Creatine Kinase 47 20 - 180 U/L   CBC Auto Differential    Specimen: Blood   Result Value Ref Range    WBC 11.23 (H) 3.40 - 10.80 10*3/mm3    RBC 4.09 3.77 - 5.28 10*6/mm3    Hemoglobin 12.0 12.0  - 15.9 g/dL    Hematocrit 37.2 34.0 - 46.6 %    MCV 91.0 79.0 - 97.0 fL    MCH 29.3 26.6 - 33.0 pg    MCHC 32.3 31.5 - 35.7 g/dL    RDW 14.0 12.3 - 15.4 %    RDW-SD 46.9 37.0 - 54.0 fl    MPV 10.2 6.0 - 12.0 fL    Platelets 192 140 - 450 10*3/mm3    Neutrophil % 67.4 42.7 - 76.0 %    Lymphocyte % 15.9 (L) 19.6 - 45.3 %    Monocyte % 15.9 (H) 5.0 - 12.0 %    Eosinophil % 0.1 (L) 0.3 - 6.2 %    Basophil % 0.3 0.0 - 1.5 %    Immature Grans % 0.4 0.0 - 0.5 %    Neutrophils, Absolute 7.58 (H) 1.70 - 7.00 10*3/mm3    Lymphocytes, Absolute 1.79 0.70 - 3.10 10*3/mm3    Monocytes, Absolute 1.78 (H) 0.10 - 0.90 10*3/mm3    Eosinophils, Absolute 0.01 0.00 - 0.40 10*3/mm3    Basophils, Absolute 0.03 0.00 - 0.20 10*3/mm3    Immature Grans, Absolute 0.04 0.00 - 0.05 10*3/mm3    nRBC 0.0 0.0 - 0.2 /100 WBC   Urinalysis, Microscopic Only - Urine, Clean Catch    Specimen: Urine, Clean Catch   Result Value Ref Range    RBC, UA 3-5 (A) None Seen /HPF    WBC, UA 31-50 (A) None Seen, 0-2, 3-5 /HPF    Bacteria, UA 1+ (A) None Seen /HPF    Squamous Epithelial Cells, UA 13-20 (A) None Seen, 0-2 /HPF    Hyaline Casts, UA None Seen None Seen /LPF    Methodology Manual Light Microscopy    Troponin    Specimen: Blood   Result Value Ref Range    Troponin T <0.010 0.000 - 0.030 ng/mL   BNP    Specimen: Blood   Result Value Ref Range    proBNP 1,588.0 0.0-1,800.0 pg/mL   Troponin    Specimen: Blood   Result Value Ref Range    Troponin T <0.010 0.000 - 0.030 ng/mL   Basic Metabolic Panel    Specimen: Blood   Result Value Ref Range    Glucose 115 (H) 65 - 99 mg/dL    BUN 12 8 - 23 mg/dL    Creatinine 0.83 0.57 - 1.00 mg/dL    Sodium 136 136 - 145 mmol/L    Potassium 4.2 3.5 - 5.2 mmol/L    Chloride 100 98 - 107 mmol/L    CO2 20.0 (L) 22.0 - 29.0 mmol/L    Calcium 9.8 8.6 - 10.5 mg/dL    eGFR Non African Amer 66 >60 mL/min/1.73    BUN/Creatinine Ratio 14.5 7.0 - 25.0    Anion Gap 16.0 (H) 5.0 - 15.0 mmol/L   Phosphorus    Specimen: Blood   Result  Value Ref Range    Phosphorus 3.0 2.5 - 4.5 mg/dL   CBC Auto Differential    Specimen: Blood   Result Value Ref Range    WBC 8.37 3.40 - 10.80 10*3/mm3    RBC 3.57 (L) 3.77 - 5.28 10*6/mm3    Hemoglobin 10.7 (L) 12.0 - 15.9 g/dL    Hematocrit 32.3 (L) 34.0 - 46.6 %    MCV 90.5 79.0 - 97.0 fL    MCH 30.0 26.6 - 33.0 pg    MCHC 33.1 31.5 - 35.7 g/dL    RDW 14.3 12.3 - 15.4 %    RDW-SD 47.6 37.0 - 54.0 fl    MPV 10.6 6.0 - 12.0 fL    Platelets 178 140 - 450 10*3/mm3    Neutrophil % 55.1 42.7 - 76.0 %    Lymphocyte % 26.9 19.6 - 45.3 %    Monocyte % 14.9 (H) 5.0 - 12.0 %    Eosinophil % 2.2 0.3 - 6.2 %    Basophil % 0.5 0.0 - 1.5 %    Immature Grans % 0.4 0.0 - 0.5 %    Neutrophils, Absolute 4.62 1.70 - 7.00 10*3/mm3    Lymphocytes, Absolute 2.25 0.70 - 3.10 10*3/mm3    Monocytes, Absolute 1.25 (H) 0.10 - 0.90 10*3/mm3    Eosinophils, Absolute 0.18 0.00 - 0.40 10*3/mm3    Basophils, Absolute 0.04 0.00 - 0.20 10*3/mm3    Immature Grans, Absolute 0.03 0.00 - 0.05 10*3/mm3    nRBC 0.0 0.0 - 0.2 /100 WBC   Basic Metabolic Panel    Specimen: Blood   Result Value Ref Range    Glucose 96 65 - 99 mg/dL    BUN 15 8 - 23 mg/dL    Creatinine 0.85 0.57 - 1.00 mg/dL    Sodium 136 136 - 145 mmol/L    Potassium 4.0 3.5 - 5.2 mmol/L    Chloride 103 98 - 107 mmol/L    CO2 24.0 22.0 - 29.0 mmol/L    Calcium 9.2 8.6 - 10.5 mg/dL    eGFR Non African Amer 65 >60 mL/min/1.73    BUN/Creatinine Ratio 17.6 7.0 - 25.0    Anion Gap 9.0 5.0 - 15.0 mmol/L   Phosphorus    Specimen: Blood   Result Value Ref Range    Phosphorus 2.0 (L) 2.5 - 4.5 mg/dL   CBC Auto Differential    Specimen: Blood   Result Value Ref Range    WBC 7.54 3.40 - 10.80 10*3/mm3    RBC 3.55 (L) 3.77 - 5.28 10*6/mm3    Hemoglobin 10.3 (L) 12.0 - 15.9 g/dL    Hematocrit 32.3 (L) 34.0 - 46.6 %    MCV 91.0 79.0 - 97.0 fL    MCH 29.0 26.6 - 33.0 pg    MCHC 31.9 31.5 - 35.7 g/dL    RDW 13.9 12.3 - 15.4 %    RDW-SD 47.0 37.0 - 54.0 fl    MPV 10.9 6.0 - 12.0 fL    Platelets 180 140 -  450 10*3/mm3    Neutrophil % 66.6 42.7 - 76.0 %    Lymphocyte % 17.0 (L) 19.6 - 45.3 %    Monocyte % 13.5 (H) 5.0 - 12.0 %    Eosinophil % 2.1 0.3 - 6.2 %    Basophil % 0.4 0.0 - 1.5 %    Immature Grans % 0.4 0.0 - 0.5 %    Neutrophils, Absolute 5.02 1.70 - 7.00 10*3/mm3    Lymphocytes, Absolute 1.28 0.70 - 3.10 10*3/mm3    Monocytes, Absolute 1.02 (H) 0.10 - 0.90 10*3/mm3    Eosinophils, Absolute 0.16 0.00 - 0.40 10*3/mm3    Basophils, Absolute 0.03 0.00 - 0.20 10*3/mm3    Immature Grans, Absolute 0.03 0.00 - 0.05 10*3/mm3    nRBC 0.0 0.0 - 0.2 /100 WBC   Stress Test With Myocardial Perfusion One Day   Result Value Ref Range    Target HR (85%) 120 bpm    Max. Pred. HR (100%) 141 bpm     CV STRESS PROTOCOL 1 Pharmacologic     Stage 1 1     HR Stage 1 87     BP Stage 1 177/67     Duration Min Stage 1 0     Duration Sec Stage 1 10     Stress Dose Regadenoson Stage 1 0.4     Stress Comments Stage 1 10 sec bolus injection     Baseline HR 87 bpm    Baseline /67 mmHg    Peak  bpm    Percent Max Pred HR 78.01 %    Percent Target HR 92 %    Peak /75 mmHg    Recovery HR 99 bpm    Recovery /80 mmHg    Estimated workload 1.0 METS    BH CV REST NUCLEAR ISOTOPE DOSE 10.4 mCi    BH CV STRESS NUCLEAR ISOTOPE DOSE 36.4 mCi    Nuc Stress EF 80 %   ECG 12 Lead   Result Value Ref Range    QT Interval 422 ms    QTC Interval 417 ms   Adult Transthoracic Echo Complete w/ Color, Spectral and Contrast if Necessary Per Protocol   Result Value Ref Range    BSA 1.8 m^2    RVIDd 2.8 cm    IVSd 1.0 cm    LVIDd 4.0 cm    LVIDs 2.2 cm    LVPWd 1.2 cm    IVS/LVPW 0.83     FS 44.5 %    EDV(Teich) 69.2 ml    ESV(Teich) 16.4 ml    EF(Teich) 76.3 %    EDV(cubed) 63.0 ml    ESV(cubed) 10.8 ml    EF(cubed) 82.9 %    LV mass(C)d 151.3 grams    LV mass(C)dI 82.4 grams/m^2    SV(Teich) 52.8 ml    SI(Teich) 28.7 ml/m^2    SV(cubed) 52.3 ml    SI(cubed) 28.5 ml/m^2    Ao root diam 2.8 cm    Ao root area 6.2 cm^2    ACS 1.8 cm    LA  dimension 4.0 cm    asc Aorta Diam 3.0 cm    LA/Ao 1.4     LVOT diam 2.0 cm    LVOT area 3.1 cm^2    LVOT area(traced) 3.1 cm^2    LVLd ap4 7.1 cm    EDV(MOD-sp4) 46.3 ml    LVLs ap4 6.2 cm    ESV(MOD-sp4) 16.4 ml    EF(MOD-sp4) 64.6 %    LVLd ap2 6.6 cm    EDV(MOD-sp2) 53.1 ml    LVLs ap2 5.7 cm    ESV(MOD-sp2) 22.8 ml    EF(MOD-sp2) 57.1 %    SV(MOD-sp4) 29.9 ml    SI(MOD-sp4) 16.3 ml/m^2    SV(MOD-sp2) 30.3 ml    SI(MOD-sp2) 16.5 ml/m^2    Ao root area (BSA corrected) 1.5     LV Hudson Vol (BSA corrected) 25.2 ml/m^2    LV Sys Vol (BSA corrected) 8.9 ml/m^2    MV E max christina 133.0 cm/sec    MV A max christina 81.0 cm/sec    MV E/A 1.6     MV P1/2t max christina 146.0 cm/sec    MV P1/2t 60.8 msec    MVA(P1/2t) 3.6 cm^2    MV dec slope 703.0 cm/sec^2    Ao pk christina 155.0 cm/sec    Ao max PG 9.6 mmHg    Ao max PG (full) 2.7 mmHg    Ao V2 mean 107.0 cm/sec    Ao mean PG 5.0 mmHg    Ao mean PG (full) 2.0 mmHg    Ao V2 VTI 36.1 cm    ISABELLA(I,A) 2.5 cm^2    ISABELLA(I,D) 2.5 cm^2    ISABELLA(V,A) 2.7 cm^2    ISABELLA(V,D) 2.7 cm^2    LV V1 max PG 6.9 mmHg    LV V1 mean PG 3.0 mmHg    LV V1 max 131.0 cm/sec    LV V1 mean 85.1 cm/sec    LV V1 VTI 28.4 cm    MR max christina 552.0 cm/sec    MR max .9 mmHg    SV(Ao) 222.3 ml    SI(Ao) 121.1 ml/m^2    SV(LVOT) 89.2 ml    SI(LVOT) 48.6 ml/m^2    PA V2 max 129.0 cm/sec    PA max PG 6.7 mmHg    PA max PG (full) 4.1 mmHg    RV V1 max PG 2.5 mmHg    RV V1 mean PG 2.0 mmHg    RV V1 max 79.6 cm/sec    RV V1 mean 58.1 cm/sec    RV V1 VTI 18.5 cm    TR max christina 266.0 cm/sec    RVSP(TR) 33.3 mmHg    RAP systole 5.0 mmHg    MVA P1/2T LCG 1.5 cm^2     CV ECHO WALDEMAR - BZI_BMI 31.4 kilograms/m^2     CV ECHO WALDEMAR - BSA(HAYCOCK) 1.9 m^2     CV ECHO WALDEMAR - BZI_METRIC_WEIGHT 80.3 kg     CV ECHO WALDEMAR - BZI_METRIC_HEIGHT 160.0 cm    Target HR (85%) 120 bpm    Max. Pred. HR (100%) 141 bpm   Gold Top - SST   Result Value Ref Range    Extra Tube Hold for add-ons.    Green Top (Gel)   Result Value Ref Range    Extra Tube Hold  for add-ons.      Adult Transthoracic Echo Complete w/ Color, Spectral and Contrast if Necessary Per Protocol    Result Date: 10/12/2021  Narrative: · The right atrial cavity is borderline dilated. · Left ventricular ejection fraction appears to be 51 - 55%. · Left ventricular diastolic function was normal.      XR Chest 2 View    Result Date: 10/25/2021  Narrative: EXAMINATION:  XR CHEST 2 VW CLINICAL HISTORY:  81 years Female,cough, fever, R05.9 Cough, unspecified R50.9 Fever, unspecified COMPARISON:  Chest x-ray dated 10/10/2021 FINDINGS:  Cardiomegaly, also present previously. Pulmonary vascularity is within normal limits. There is patchy atelectasis or possibly infiltrate in the lung bases, increased relative to the prior exam. No pneumothorax.     Impression: 1. Patchy atelectasis or possibly infiltrate in the lung bases, increased relative to 10/10/2021. 2. Stable cardiomegaly. Electronically signed by:  Ward Grigsby MD  10/25/2021 2:47 PM CDT Workstation: [id number]    XR Chest 1 View    Result Date: 10/10/2021  Narrative: PROCEDURE: Single chest view portable REASON FOR EXAM:SOB FINDINGS: Comparison exam dated December 26, 2018. Stable prominent right epicardial fat pad as seen on prior CT. Cardiac and pulmonary vasculature are normal. Lungs are clear. Pleural spaces are normal. No acute osseous abnormality.     Impression: No acute cardiopulmonary abnormality. Electronically signed by:  Steve Nelson MD  10/10/2021 12:28 PM CDT Workstation: [id number]    Stress Test With Myocardial Perfusion One Day    Result Date: 10/16/2021  Narrative: · Left ventricular ejection fraction is hyperdynamic (Calculated EF > 70%). . · Myocardial perfusion imaging indicates a normal myocardial perfusion study with no evidence of ischemia. · Impressions are consistent with a low risk study. · Findings consistent with a normal ECG stress test.      CT Angiogram Chest    Result Date: 10/10/2021  Narrative: EXAM: CT CHEST  ANGIOGRAPHY WITH IV CONTRAST ORDERING PROVIDER: AICHA LUNA CLINICAL HISTORY: Shortness of breath COMPARISON: 12/28/2018 TECHNIQUE: Chest CT was performed using a high resolution pulmonary angiogram protocol with 58ml of Isovue-370 as IV contrast and reformatted in the sagittal and coronal planes. 3-dimensional images also acquired with special processing of the CT scan data with a specialized workstation for evaluation. This examination was performed according to our departmental dose optimization program which includes automated exposure control, adjustment of the MA and kV according to patient size, and/or use of iterative reconstruction technique. FINDINGS: LUNGS AND PLEURA: Mild probable dependent atelectasis versus infiltrate in the lung bases. No evidence of pleural effusion. No pneumothorax. HEART: Prominent size and unremarkable configuration. No pericardial effusion. MEDIASTINUM AND MANDO: No significant adenopathy. AORTA AND GREAT VESSELS: No aneurysm or dissection. PULMONARY ARTERIES: No filling defects. UPPER ABDOMEN: Unremarkable. MUSCULOSKELETAL:  No aggressive osseous lesion.. Unremarkable vertebral body height and alignment. No lytic or sclerotic lesion. Grade 1 retrolisthesis of L1 on L2. EXTRATHORACIC SOFT TISSUES: No axillary adenopathy. No mass. Unremarkable supraclavicular soft tissues.     Impression: 1.  No evidence of pulmonary embolism. 2.  Mild probable dependent atelectasis versus infiltrate in the bilateral lung bases. 3.  Grade 1 retrolisthesis of L1 on L2. Electronically signed by:  Main Fong MD  10/10/2021 2:18 PM CDT Workstation: [id number]             ED Course  ED Course as of 10/28/21 1048   Sun Oct 10, 2021   1602 Dr. Hendrickson paged.  [SH]   1627 Dr. Hendrickson repaged. [SH]   [id number] Spoke with Dr. Hendrickson who agrees to admission at this time.  [SH]      ED Course User Index  [SH] Aicha Luna APRN                                         HEART Score (for  prediction of 6-week risk of major adverse cardiac event) reviewed and/or performed as part of the patient evaluation and treatment planning process.  The result associated with this review/performance is: 4       MDM    Final diagnoses:   Chest pain, unspecified type       ED Disposition  ED Disposition     ED Disposition Condition Comment    Decision to Admit  Level of Care: Telemetry [5]   Diagnosis: Chest pain, unspecified type [[id number]]   Admitting Physician: CATHI TORRES [[id number]]   Attending Physician: CATHI TORRES [[id number]]            Maonlo Galarza MD  [address] [place], [place] [id number] [phone]    Follow up  3 days; office will call with appt time    Jadon Matias MD  [address] [place] [address] [address]LE[address]  [place] [id number] [phone]    Follow up in 3 month(s)  office will call with appointment         Medication List      No changes were made to your prescriptions during this visit.          Aicha Luna APRN  10/10/21 1840       Aicha Luna APRN  10/28/21 1048

## 2021-10-11 LAB
BASOPHILS # BLD AUTO: 0.04 10*3/MM3 (ref 0–0.2)
BASOPHILS NFR BLD AUTO: 0.5 % (ref 0–1.5)
DEPRECATED RDW RBC AUTO: 47.6 FL (ref 37–54)
EOSINOPHIL # BLD AUTO: 0.18 10*3/MM3 (ref 0–0.4)
EOSINOPHIL NFR BLD AUTO: 2.2 % (ref 0.3–6.2)
ERYTHROCYTE [DISTWIDTH] IN BLOOD BY AUTOMATED COUNT: 14.3 % (ref 12.3–15.4)
HCT VFR BLD AUTO: 32.3 % (ref 34–46.6)
HGB BLD-MCNC: 10.7 G/DL (ref 12–15.9)
HOLD SPECIMEN: NORMAL
IMM GRANULOCYTES # BLD AUTO: 0.03 10*3/MM3 (ref 0–0.05)
IMM GRANULOCYTES NFR BLD AUTO: 0.4 % (ref 0–0.5)
LYMPHOCYTES # BLD AUTO: 2.25 10*3/MM3 (ref 0.7–3.1)
LYMPHOCYTES NFR BLD AUTO: 26.9 % (ref 19.6–45.3)
MCH RBC QN AUTO: 30 PG (ref 26.6–33)
MCHC RBC AUTO-ENTMCNC: 33.1 G/DL (ref 31.5–35.7)
MCV RBC AUTO: 90.5 FL (ref 79–97)
MONOCYTES # BLD AUTO: 1.25 10*3/MM3 (ref 0.1–0.9)
MONOCYTES NFR BLD AUTO: 14.9 % (ref 5–12)
NEUTROPHILS NFR BLD AUTO: 4.62 10*3/MM3 (ref 1.7–7)
NEUTROPHILS NFR BLD AUTO: 55.1 % (ref 42.7–76)
NRBC BLD AUTO-RTO: 0 /100 WBC (ref 0–0.2)
PLATELET # BLD AUTO: 178 10*3/MM3 (ref 140–450)
PMV BLD AUTO: 10.6 FL (ref 6–12)
RBC # BLD AUTO: 3.57 10*6/MM3 (ref 3.77–5.28)
WBC # BLD AUTO: 8.37 10*3/MM3 (ref 3.4–10.8)

## 2021-10-11 PROCEDURE — 85025 COMPLETE CBC W/AUTO DIFF WBC: CPT | Performed by: STUDENT IN AN ORGANIZED HEALTH CARE EDUCATION/TRAINING PROGRAM

## 2021-10-11 PROCEDURE — 25010000002 ENOXAPARIN PER 10 MG: Performed by: STUDENT IN AN ORGANIZED HEALTH CARE EDUCATION/TRAINING PROGRAM

## 2021-10-11 PROCEDURE — 96372 THER/PROPH/DIAG INJ SC/IM: CPT

## 2021-10-11 PROCEDURE — G0378 HOSPITAL OBSERVATION PER HR: HCPCS

## 2021-10-11 PROCEDURE — 94799 UNLISTED PULMONARY SVC/PX: CPT

## 2021-10-11 RX ADMIN — Medication 400 MG: at 09:13

## 2021-10-11 RX ADMIN — BUDESONIDE 0.5 MG: 0.5 INHALANT RESPIRATORY (INHALATION) at 08:35

## 2021-10-11 RX ADMIN — BUDESONIDE 0.5 MG: 0.5 INHALANT RESPIRATORY (INHALATION) at 19:36

## 2021-10-11 RX ADMIN — SUCRALFATE 1 G: 1 TABLET ORAL at 11:06

## 2021-10-11 RX ADMIN — SUCRALFATE 1 G: 1 TABLET ORAL at 18:11

## 2021-10-11 RX ADMIN — NEBIVOLOL HYDROCHLORIDE 5 MG: 5 TABLET ORAL at 09:11

## 2021-10-11 RX ADMIN — PANTOPRAZOLE SODIUM 40 MG: 40 TABLET, DELAYED RELEASE ORAL at 20:46

## 2021-10-11 RX ADMIN — SUCRALFATE 1 G: 1 TABLET ORAL at 05:36

## 2021-10-11 RX ADMIN — LEVOTHYROXINE SODIUM 25 MCG: 25 TABLET ORAL at 05:36

## 2021-10-11 RX ADMIN — ROSUVASTATIN CALCIUM 5 MG: 5 TABLET, FILM COATED ORAL at 20:46

## 2021-10-11 RX ADMIN — ENOXAPARIN SODIUM 40 MG: 40 INJECTION SUBCUTANEOUS at 09:11

## 2021-10-11 RX ADMIN — HYDROCODONE BITARTRATE AND ACETAMINOPHEN 1 TABLET: 5; 325 TABLET ORAL at 05:36

## 2021-10-11 RX ADMIN — PANTOPRAZOLE SODIUM 40 MG: 40 TABLET, DELAYED RELEASE ORAL at 09:11

## 2021-10-11 NOTE — PROGRESS NOTES
[facility] Medicine Services  INPATIENT PROGRESS NOTE     LOS: 0 days   Patient Care Team:  Manolo Galarza MD as PCP - General    Chief Complaint:  Chest pain      Subjective     Interval History:     Patient Complaints: Patient seen and examined. Patient resting comfortably. Patient continues to complain of chest pain at times on exertion. Patient complains of shortness of breath associated with it.    History taken from: Patient, nursing staff and chart review.    Review of Systems:    Review of Systems   Constitutional: Negative for appetite change, chills, diaphoresis and fever.   HENT: Negative for congestion, rhinorrhea, sore throat and trouble swallowing.    Eyes: Negative for visual disturbance.   Respiratory: Negative for cough, chest tightness, shortness of breath and wheezing.    Cardiovascular: Positive for chest pain and palpitations. Negative for leg swelling.   Gastrointestinal: Negative for abdominal pain, blood in stool, diarrhea, nausea and vomiting.   Endocrine: Negative for cold intolerance and heat intolerance.   Genitourinary: Negative for decreased urine volume and difficulty urinating.   Musculoskeletal: Negative for back pain, gait problem and neck pain.   Skin: Negative for rash.   Neurological: Negative for dizziness, syncope, weakness, light-headedness, numbness and headaches.   Psychiatric/Behavioral: The patient is not nervous/anxious.    All other systems reviewed and are negative.        Objective     Vital Signs  Temp:  [97.2 °F (36.2 °C)-98.2 °F (36.8 °C)] 97.3 °F (36.3 °C)  Heart Rate:  [] 74  Resp:  [15-20] 15  BP: (109-228)/() 109/51    Physical Exam:   Physical Exam  Vitals and nursing note reviewed.   Constitutional:       Appearance: She is well-developed.   HENT:      Head: Normocephalic and atraumatic.      Nose: Nose normal.   Eyes:      Conjunctiva/sclera: Conjunctivae normal.      Pupils: Pupils are equal,  round, and reactive to light.   Neck:      Thyroid: No thyromegaly.      Vascular: No JVD.      Trachea: No tracheal deviation.   Cardiovascular:      Rate and Rhythm: Normal rate and regular rhythm.      Heart sounds: Normal heart sounds.   Pulmonary:      Effort: Pulmonary effort is normal. No respiratory distress.      Breath sounds: Normal breath sounds. No wheezing or rales.   Chest:      Chest wall: No tenderness.   Abdominal:      General: Bowel sounds are normal. There is no distension.      Palpations: Abdomen is soft.      Tenderness: There is no abdominal tenderness. There is no guarding or rebound.   Musculoskeletal:         General: Normal range of motion.      Cervical back: Normal range of motion and neck supple.   Lymphadenopathy:      Cervical: No cervical adenopathy.   Skin:     General: Skin is warm and dry.      Comments: Intact   Neurological:      Mental Status: She is alert and oriented to person, place, and time.      Cranial Nerves: No cranial nerve deficit.      Deep Tendon Reflexes: Reflexes are normal and symmetric.            Results Review:       Results from last 7 days   Lab Units 10/10/21  1839 10/10/21  1142   SODIUM mmol/L 136 136   POTASSIUM mmol/L 4.2 4.2   CHLORIDE mmol/L 100 101   CO2 mmol/L 20.0* 24.0   BUN mg/dL 12 11   CREATININE mg/dL 0.83 0.78   GLUCOSE mg/dL 115* 120*   CALCIUM mg/dL 9.8 9.6   BILIRUBIN mg/dL  --  1.0   ALK PHOS U/L  --  54   ALT (SGPT) U/L  --  24   AST (SGOT) U/L  --  27       Results from last 7 days   Lab Units 10/10/21  1839   PHOSPHORUS mg/dL 3.0       Results from last 7 days   Lab Units 10/11/21  0558 10/10/21  1142   WBC 10*3/mm3 8.37 11.23*   HEMOGLOBIN g/dL 10.7* 12.0   HEMATOCRIT % 32.3* 37.2   PLATELETS 10*3/mm3 178 192       Lab Results   Component Value Date    CKTOTAL 47 10/10/2021    CKMB 0.60 12/26/2018    TROPONINI <0.012 12/27/2018    TROPONINT <0.010 10/10/2021       CO2   Date Value Ref Range Status   10/10/2021 20.0 (L) 22.0 - 29.0  mmol/L Final              Imaging Results (Last 7 Days)     Procedure Component Value Units Date/Time    CT Angiogram Chest [[id number]] Collected: 10/10/21 1320     Updated: 10/10/21 1419    Narrative:      EXAM:  CT CHEST ANGIOGRAPHY WITH IV CONTRAST    ORDERING PROVIDER:  MICHELLE DELGADO    CLINICAL HISTORY:  Shortness of breath    COMPARISON:  12/28/2018    TECHNIQUE:   Chest CT was performed using a high resolution pulmonary  angiogram protocol with 58ml of Isovue-370 as IV contrast and  reformatted in the sagittal and coronal planes.     3-dimensional images also acquired with special processing of the  CT scan data with a specialized workstation for evaluation.    This examination was performed according to our departmental dose  optimization program which includes automated exposure control,  adjustment of the MA and kV according to patient size, and/or use  of iterative reconstruction technique.     FINDINGS:     LUNGS AND PLEURA: Mild probable dependent atelectasis versus  infiltrate in the lung bases. No evidence of pleural effusion. No  pneumothorax.    HEART: Prominent size and unremarkable configuration. No  pericardial effusion.     MEDIASTINUM AND MANDO: No significant adenopathy.     AORTA AND GREAT VESSELS: No aneurysm or dissection.     PULMONARY ARTERIES: No filling defects.     UPPER ABDOMEN: Unremarkable.    MUSCULOSKELETAL:  No aggressive osseous lesion.. Unremarkable  vertebral body height and alignment. No lytic or sclerotic  lesion. Grade 1 retrolisthesis of L1 on L2.    EXTRATHORACIC SOFT TISSUES: No axillary adenopathy. No mass.  Unremarkable supraclavicular soft tissues.       Impression:      1.  No evidence of pulmonary embolism.  2.  Mild probable dependent atelectasis versus infiltrate in the  bilateral lung bases.   3.  Grade 1 retrolisthesis of L1 on L2.      Electronically signed by:  Main Fong MD  10/10/2021 2:18 PM CDT  Workstation: [id number]    XR Chest 1 View  [[id number]] Collected: 10/10/21 1200     Updated: 10/10/21 1229    Narrative:        PROCEDURE: Single chest view portable    REASON FOR EXAM:SOB    FINDINGS: Comparison exam dated December 26, 2018. Stable  prominent right epicardial fat pad as seen on prior CT. Cardiac  and pulmonary vasculature are normal. Lungs are clear. Pleural  spaces are normal. No acute osseous abnormality.      Impression:      No acute cardiopulmonary abnormality.    Electronically signed by:  Steve Nelson MD  10/10/2021 12:28 PM CDT  Workstation: [id number]         No results found for: ACANTHNAEG, AFBCX, BPERTUSSISCX, BLOODCX  No results found for: BCIDPCR, CXREFLEX, CSFCX, CULTURETIS  No results found for: CULTURES, HSVCX, URCX  No results found for: EYECULTURE, GCCX, HSVCULTURE, LABHSV  No results found for: LEGIONELLA, MRSACX, MUMPSCX, MYCOPLASCX  No results found for: NOCARDIACX, STOOLCX  No results found for: THROATCX, UNSTIMCULT, URINECX, CULTURE, VZVCULTUR  No results found for: VIRALCULTU, WOUNDCX     Medication Review:   Current Facility-Administered Medications   Medication Dose Route Frequency Provider Last Rate Last Admin   • acetaminophen (TYLENOL) tablet 650 mg  650 mg Oral Q6H PRN Richie Anaya N, DO       • budesonide (PULMICORT) nebulizer solution 0.5 mg  0.5 mg Nebulization BID - RT [place], Richie N, DO   0.5 mg at 10/11/21 0835   • calcium carbonate (TUMS) chewable tablet 500 mg (200 mg elemental)  2 tablet Oral BID PRN Jac[place], Richie N, DO       • enoxaparin (LOVENOX) syringe 40 mg  40 mg Subcutaneous Q24H Richie Anaya N, DO   40 mg at 10/11/21 0911   • HYDROcodone-acetaminophen (NORCO) 5-325 MG per tablet 1 tablet  1 tablet Oral Q6H PRN Richie Anaya N, DO   1 tablet at 10/11/21 0536   • levothyroxine (SYNTHROID, LEVOTHROID) tablet 25 mcg  25 mcg Oral Q AM Richie Anaya N, DO   25 mcg at 10/11/21 0536   • magnesium oxide (MAG-OX) tablet 400 mg  400 mg Oral Daily Richie Anaya DO   400 mg at 10/11/21 0902    • Magnesium Sulfate 2 gram Bolus, followed by 8 gram infusion (total Mg dose 10 grams)- Mg less than or equal to 1mg/dL  2 g Intravenous PRN Joshua Anayalan N, DO        Or   • Magnesium Sulfate 2 gram / 50mL Infusion (GIVE X 3 BAGS TO EQUAL 6GM TOTAL DOSE) - Mg 1.1 - 1.5 mg/dl  2 g Intravenous PRN [place], Richie N, DO        Or   • Magnesium Sulfate 4 gram infusion- Mg 1.6-1.9 mg/dL  4 g Intravenous PRN Jaclyn, Richie N, DO       • meclizine (ANTIVERT) tablet 25 mg  25 mg Oral TID PRN Joshua Anayalan N, DO   25 mg at 10/10/21 2149   • morphine injection 2 mg  2 mg Intravenous Q4H PRN Joshua Anayalan N, DO       • nebivolol (BYSTOLIC) tablet 5 mg  5 mg Oral Daily Richie Anaya N, DO   5 mg at 10/11/21 0911   • ondansetron (ZOFRAN) injection 4 mg  4 mg Intravenous Q6H PRN Joshua Anayalan N, DO       • pantoprazole (PROTONIX) EC tablet 40 mg  40 mg Oral BID - RT Joshua Anayalan N, DO   40 mg at 10/11/21 0911   • potassium & sodium phosphates (PHOS-NAK) 280-160-250 MG packet - for Phosphorus less than 1.25 mg/dL  2 packet Oral Q6H PRN Joshua Anayalan N, DO        Or   • potassium & sodium phosphates (PHOS-NAK) 280-160-250 MG packet - for Phosphorus 1.25 - 2.5 mg/dL  2 packet Oral Q6H PRN Joshua Anayalan N, DO       • potassium chloride (KLOR-CON) packet 40 mEq  40 mEq Oral PRN Joshua Anayalan N, DO       • potassium chloride (MICRO-K) CR capsule 40 mEq  40 mEq Oral PRN Joshua Anayalan N, DO       • rosuvastatin (CRESTOR) tablet 5 mg  5 mg Oral Nightly Joshua Anayalan N, DO   5 mg at 10/10/21 2148   • sodium chloride 0.9 % flush 10 mL  10 mL Intravenous PRN Richie Anaya N, DO       • sodium chloride 0.9 % flush 10 mL  10 mL Intravenous PRN [place], Richie N, DO       • sucralfate (CARAFATE) tablet 1 g  1 g Oral Q8H Richie Anaya N, DO   1 g at 10/11/21 0536         Assessment/Plan       Chest pain          -We'll get cardiology evaluation.  -We'll follow cardiology recommendation regards to chest pain and further  work-up.  -If patient is cleared we'll consider discharge planning in a.m.  -DVT and GI prophylaxis in place.  -We'll continue monitoring patient in hospital setting and treat patient as course dictates.  -Please review orders for detailed plan of care.  -We'll continue with home medication as prior to hospitalization.          This document has been electronically signed by Thalia Tucker MD on October 11, 2021 09:40 CDT        EMR Dragon/Transcription disclaimer:   Dictated utilizing Dragon dictation.

## 2021-10-11 NOTE — PLAN OF CARE
Goal Outcome Evaluation:         No changes, no reported chest pain. Waiting to see Dr. Matias

## 2021-10-12 ENCOUNTER — APPOINTMENT (OUTPATIENT)
Dept: NUCLEAR MEDICINE | Facility: HOSPITAL | Age: 81
End: 2021-10-12

## 2021-10-12 ENCOUNTER — APPOINTMENT (OUTPATIENT)
Dept: CARDIOLOGY | Facility: HOSPITAL | Age: 81
End: 2021-10-12

## 2021-10-12 VITALS
BODY MASS INDEX: 30.26 KG/M2 | HEART RATE: 83 BPM | DIASTOLIC BLOOD PRESSURE: 51 MMHG | HEIGHT: 64 IN | SYSTOLIC BLOOD PRESSURE: 95 MMHG | RESPIRATION RATE: 18 BRPM | WEIGHT: 177.25 LBS | TEMPERATURE: 98.4 F | OXYGEN SATURATION: 97 %

## 2021-10-12 LAB
ANION GAP SERPL CALCULATED.3IONS-SCNC: 9 MMOL/L (ref 5–15)
BASOPHILS # BLD AUTO: 0.03 10*3/MM3 (ref 0–0.2)
BASOPHILS NFR BLD AUTO: 0.4 % (ref 0–1.5)
BH CV ECHO MEAS - ACS: 1.8 CM
BH CV ECHO MEAS - AO MAX PG (FULL): 2.7 MMHG
BH CV ECHO MEAS - AO MAX PG: 9.6 MMHG
BH CV ECHO MEAS - AO MEAN PG (FULL): 2 MMHG
BH CV ECHO MEAS - AO MEAN PG: 5 MMHG
BH CV ECHO MEAS - AO ROOT AREA (BSA CORRECTED): 1.5
BH CV ECHO MEAS - AO ROOT AREA: 6.2 CM^2
BH CV ECHO MEAS - AO ROOT DIAM: 2.8 CM
BH CV ECHO MEAS - AO V2 MAX: 155 CM/SEC
BH CV ECHO MEAS - AO V2 MEAN: 107 CM/SEC
BH CV ECHO MEAS - AO V2 VTI: 36.1 CM
BH CV ECHO MEAS - ASC AORTA: 3 CM
BH CV ECHO MEAS - AVA(I,A): 2.5 CM^2
BH CV ECHO MEAS - AVA(I,D): 2.5 CM^2
BH CV ECHO MEAS - AVA(V,A): 2.7 CM^2
BH CV ECHO MEAS - AVA(V,D): 2.7 CM^2
BH CV ECHO MEAS - BSA(HAYCOCK): 1.9 M^2
BH CV ECHO MEAS - BSA: 1.8 M^2
BH CV ECHO MEAS - BZI_BMI: 31.4 KILOGRAMS/M^2
BH CV ECHO MEAS - BZI_METRIC_HEIGHT: 160 CM
BH CV ECHO MEAS - BZI_METRIC_WEIGHT: 80.3 KG
BH CV ECHO MEAS - EDV(CUBED): 63 ML
BH CV ECHO MEAS - EDV(MOD-SP2): 53.1 ML
BH CV ECHO MEAS - EDV(MOD-SP4): 46.3 ML
BH CV ECHO MEAS - EDV(TEICH): 69.2 ML
BH CV ECHO MEAS - EF(CUBED): 82.9 %
BH CV ECHO MEAS - EF(MOD-SP2): 57.1 %
BH CV ECHO MEAS - EF(MOD-SP4): 64.6 %
BH CV ECHO MEAS - EF(TEICH): 76.3 %
BH CV ECHO MEAS - ESV(CUBED): 10.8 ML
BH CV ECHO MEAS - ESV(MOD-SP2): 22.8 ML
BH CV ECHO MEAS - ESV(MOD-SP4): 16.4 ML
BH CV ECHO MEAS - ESV(TEICH): 16.4 ML
BH CV ECHO MEAS - FS: 44.5 %
BH CV ECHO MEAS - IVS/LVPW: 0.83
BH CV ECHO MEAS - IVSD: 1 CM
BH CV ECHO MEAS - LA DIMENSION: 4 CM
BH CV ECHO MEAS - LA/AO: 1.4
BH CV ECHO MEAS - LV DIASTOLIC VOL/BSA (35-75): 25.2 ML/M^2
BH CV ECHO MEAS - LV MASS(C)D: 151.3 GRAMS
BH CV ECHO MEAS - LV MASS(C)DI: 82.4 GRAMS/M^2
BH CV ECHO MEAS - LV MAX PG: 6.9 MMHG
BH CV ECHO MEAS - LV MEAN PG: 3 MMHG
BH CV ECHO MEAS - LV SYSTOLIC VOL/BSA (12-30): 8.9 ML/M^2
BH CV ECHO MEAS - LV V1 MAX: 131 CM/SEC
BH CV ECHO MEAS - LV V1 MEAN: 85.1 CM/SEC
BH CV ECHO MEAS - LV V1 VTI: 28.4 CM
BH CV ECHO MEAS - LVIDD: 4 CM
BH CV ECHO MEAS - LVIDS: 2.2 CM
BH CV ECHO MEAS - LVLD AP2: 6.6 CM
BH CV ECHO MEAS - LVLD AP4: 7.1 CM
BH CV ECHO MEAS - LVLS AP2: 5.7 CM
BH CV ECHO MEAS - LVLS AP4: 6.2 CM
BH CV ECHO MEAS - LVOT AREA (M): 3.1 CM^2
BH CV ECHO MEAS - LVOT AREA: 3.1 CM^2
BH CV ECHO MEAS - LVOT DIAM: 2 CM
BH CV ECHO MEAS - LVPWD: 1.2 CM
BH CV ECHO MEAS - MR MAX PG: 121.9 MMHG
BH CV ECHO MEAS - MR MAX VEL: 552 CM/SEC
BH CV ECHO MEAS - MV A MAX VEL: 81 CM/SEC
BH CV ECHO MEAS - MV DEC SLOPE: 703 CM/SEC^2
BH CV ECHO MEAS - MV E MAX VEL: 133 CM/SEC
BH CV ECHO MEAS - MV E/A: 1.6
BH CV ECHO MEAS - MV P1/2T MAX VEL: 146 CM/SEC
BH CV ECHO MEAS - MV P1/2T: 60.8 MSEC
BH CV ECHO MEAS - MVA P1/2T LCG: 1.5 CM^2
BH CV ECHO MEAS - MVA(P1/2T): 3.6 CM^2
BH CV ECHO MEAS - PA MAX PG (FULL): 4.1 MMHG
BH CV ECHO MEAS - PA MAX PG: 6.7 MMHG
BH CV ECHO MEAS - PA V2 MAX: 129 CM/SEC
BH CV ECHO MEAS - RAP SYSTOLE: 5 MMHG
BH CV ECHO MEAS - RV MAX PG: 2.5 MMHG
BH CV ECHO MEAS - RV MEAN PG: 2 MMHG
BH CV ECHO MEAS - RV V1 MAX: 79.6 CM/SEC
BH CV ECHO MEAS - RV V1 MEAN: 58.1 CM/SEC
BH CV ECHO MEAS - RV V1 VTI: 18.5 CM
BH CV ECHO MEAS - RVDD: 2.8 CM
BH CV ECHO MEAS - RVSP: 33.3 MMHG
BH CV ECHO MEAS - SI(AO): 121.1 ML/M^2
BH CV ECHO MEAS - SI(CUBED): 28.5 ML/M^2
BH CV ECHO MEAS - SI(LVOT): 48.6 ML/M^2
BH CV ECHO MEAS - SI(MOD-SP2): 16.5 ML/M^2
BH CV ECHO MEAS - SI(MOD-SP4): 16.3 ML/M^2
BH CV ECHO MEAS - SI(TEICH): 28.7 ML/M^2
BH CV ECHO MEAS - SV(AO): 222.3 ML
BH CV ECHO MEAS - SV(CUBED): 52.3 ML
BH CV ECHO MEAS - SV(LVOT): 89.2 ML
BH CV ECHO MEAS - SV(MOD-SP2): 30.3 ML
BH CV ECHO MEAS - SV(MOD-SP4): 29.9 ML
BH CV ECHO MEAS - SV(TEICH): 52.8 ML
BH CV ECHO MEAS - TR MAX VEL: 266 CM/SEC
BUN SERPL-MCNC: 15 MG/DL (ref 8–23)
BUN/CREAT SERPL: 17.6 (ref 7–25)
CALCIUM SPEC-SCNC: 9.2 MG/DL (ref 8.6–10.5)
CHLORIDE SERPL-SCNC: 103 MMOL/L (ref 98–107)
CO2 SERPL-SCNC: 24 MMOL/L (ref 22–29)
CREAT SERPL-MCNC: 0.85 MG/DL (ref 0.57–1)
DEPRECATED RDW RBC AUTO: 47 FL (ref 37–54)
EOSINOPHIL # BLD AUTO: 0.16 10*3/MM3 (ref 0–0.4)
EOSINOPHIL NFR BLD AUTO: 2.1 % (ref 0.3–6.2)
ERYTHROCYTE [DISTWIDTH] IN BLOOD BY AUTOMATED COUNT: 13.9 % (ref 12.3–15.4)
GFR SERPL CREATININE-BSD FRML MDRD: 65 ML/MIN/1.73
GLUCOSE SERPL-MCNC: 96 MG/DL (ref 65–99)
HCT VFR BLD AUTO: 32.3 % (ref 34–46.6)
HGB BLD-MCNC: 10.3 G/DL (ref 12–15.9)
IMM GRANULOCYTES # BLD AUTO: 0.03 10*3/MM3 (ref 0–0.05)
IMM GRANULOCYTES NFR BLD AUTO: 0.4 % (ref 0–0.5)
LYMPHOCYTES # BLD AUTO: 1.28 10*3/MM3 (ref 0.7–3.1)
LYMPHOCYTES NFR BLD AUTO: 17 % (ref 19.6–45.3)
MAXIMAL PREDICTED HEART RATE: 141 BPM
MCH RBC QN AUTO: 29 PG (ref 26.6–33)
MCHC RBC AUTO-ENTMCNC: 31.9 G/DL (ref 31.5–35.7)
MCV RBC AUTO: 91 FL (ref 79–97)
MONOCYTES # BLD AUTO: 1.02 10*3/MM3 (ref 0.1–0.9)
MONOCYTES NFR BLD AUTO: 13.5 % (ref 5–12)
NEUTROPHILS NFR BLD AUTO: 5.02 10*3/MM3 (ref 1.7–7)
NEUTROPHILS NFR BLD AUTO: 66.6 % (ref 42.7–76)
NRBC BLD AUTO-RTO: 0 /100 WBC (ref 0–0.2)
PHOSPHATE SERPL-MCNC: 2 MG/DL (ref 2.5–4.5)
PLATELET # BLD AUTO: 180 10*3/MM3 (ref 140–450)
PMV BLD AUTO: 10.9 FL (ref 6–12)
POTASSIUM SERPL-SCNC: 4 MMOL/L (ref 3.5–5.2)
RBC # BLD AUTO: 3.55 10*6/MM3 (ref 3.77–5.28)
SODIUM SERPL-SCNC: 136 MMOL/L (ref 136–145)
STRESS TARGET HR: 120 BPM
WBC # BLD AUTO: 7.54 10*3/MM3 (ref 3.4–10.8)

## 2021-10-12 PROCEDURE — 25010000002 TRIAMCINOLONE PER 10 MG: Performed by: INTERNAL MEDICINE

## 2021-10-12 PROCEDURE — 94799 UNLISTED PULMONARY SVC/PX: CPT

## 2021-10-12 PROCEDURE — G0378 HOSPITAL OBSERVATION PER HR: HCPCS

## 2021-10-12 PROCEDURE — 25010000002 ENOXAPARIN PER 10 MG: Performed by: STUDENT IN AN ORGANIZED HEALTH CARE EDUCATION/TRAINING PROGRAM

## 2021-10-12 PROCEDURE — 84100 ASSAY OF PHOSPHORUS: CPT | Performed by: STUDENT IN AN ORGANIZED HEALTH CARE EDUCATION/TRAINING PROGRAM

## 2021-10-12 PROCEDURE — 93017 CV STRESS TEST TRACING ONLY: CPT

## 2021-10-12 PROCEDURE — 85025 COMPLETE CBC W/AUTO DIFF WBC: CPT | Performed by: STUDENT IN AN ORGANIZED HEALTH CARE EDUCATION/TRAINING PROGRAM

## 2021-10-12 PROCEDURE — 93306 TTE W/DOPPLER COMPLETE: CPT

## 2021-10-12 PROCEDURE — 0 TECHNETIUM SESTAMIBI: Performed by: INTERNAL MEDICINE

## 2021-10-12 PROCEDURE — 25010000002 KETOROLAC TROMETHAMINE PER 15 MG: Performed by: INTERNAL MEDICINE

## 2021-10-12 PROCEDURE — 25010000002 REGADENOSON 0.4 MG/5ML SOLUTION: Performed by: INTERNAL MEDICINE

## 2021-10-12 PROCEDURE — 96372 THER/PROPH/DIAG INJ SC/IM: CPT

## 2021-10-12 PROCEDURE — 96376 TX/PRO/DX INJ SAME DRUG ADON: CPT

## 2021-10-12 PROCEDURE — 36415 COLL VENOUS BLD VENIPUNCTURE: CPT | Performed by: STUDENT IN AN ORGANIZED HEALTH CARE EDUCATION/TRAINING PROGRAM

## 2021-10-12 PROCEDURE — A9500 TC99M SESTAMIBI: HCPCS | Performed by: INTERNAL MEDICINE

## 2021-10-12 PROCEDURE — 80048 BASIC METABOLIC PNL TOTAL CA: CPT | Performed by: STUDENT IN AN ORGANIZED HEALTH CARE EDUCATION/TRAINING PROGRAM

## 2021-10-12 PROCEDURE — 96375 TX/PRO/DX INJ NEW DRUG ADDON: CPT

## 2021-10-12 PROCEDURE — 78452 HT MUSCLE IMAGE SPECT MULT: CPT

## 2021-10-12 PROCEDURE — 25010000002 ONDANSETRON PER 1 MG: Performed by: STUDENT IN AN ORGANIZED HEALTH CARE EDUCATION/TRAINING PROGRAM

## 2021-10-12 RX ORDER — SODIUM CHLORIDE 0.9 % (FLUSH) 0.9 %
10 SYRINGE (ML) INJECTION ONCE
Status: COMPLETED | OUTPATIENT
Start: 2021-10-12 | End: 2021-10-12

## 2021-10-12 RX ORDER — ALBUTEROL SULFATE 2.5 MG/3ML
2.5 SOLUTION RESPIRATORY (INHALATION) ONCE
Status: COMPLETED | OUTPATIENT
Start: 2021-10-12 | End: 2021-10-12

## 2021-10-12 RX ORDER — KETOROLAC TROMETHAMINE 30 MG/ML
30 INJECTION, SOLUTION INTRAMUSCULAR; INTRAVENOUS ONCE
Status: COMPLETED | OUTPATIENT
Start: 2021-10-12 | End: 2021-10-12

## 2021-10-12 RX ORDER — TRIAMCINOLONE ACETONIDE 40 MG/ML
40 INJECTION, SUSPENSION INTRA-ARTICULAR; INTRAMUSCULAR ONCE
Status: COMPLETED | OUTPATIENT
Start: 2021-10-12 | End: 2021-10-12

## 2021-10-12 RX ADMIN — SODIUM CHLORIDE, PRESERVATIVE FREE 10 ML: 5 INJECTION INTRAVENOUS at 11:02

## 2021-10-12 RX ADMIN — Medication 400 MG: at 08:28

## 2021-10-12 RX ADMIN — ALBUTEROL SULFATE 2.5 MG: 2.5 SOLUTION RESPIRATORY (INHALATION) at 10:54

## 2021-10-12 RX ADMIN — ENOXAPARIN SODIUM 40 MG: 40 INJECTION SUBCUTANEOUS at 08:29

## 2021-10-12 RX ADMIN — LEVOTHYROXINE SODIUM 25 MCG: 25 TABLET ORAL at 05:35

## 2021-10-12 RX ADMIN — REGADENOSON 0.4 MG: 0.08 INJECTION, SOLUTION INTRAVENOUS at 11:02

## 2021-10-12 RX ADMIN — BUDESONIDE 0.5 MG: 0.5 INHALANT RESPIRATORY (INHALATION) at 08:05

## 2021-10-12 RX ADMIN — BUDESONIDE 0.5 MG: 0.5 INHALANT RESPIRATORY (INHALATION) at 19:33

## 2021-10-12 RX ADMIN — SUCRALFATE 1 G: 1 TABLET ORAL at 05:35

## 2021-10-12 RX ADMIN — TECHNETIUM TC 99M SESTAMIBI 1 DOSE: 1 INJECTION INTRAVENOUS at 11:02

## 2021-10-12 RX ADMIN — HYDROCODONE BITARTRATE AND ACETAMINOPHEN 1 TABLET: 5; 325 TABLET ORAL at 11:38

## 2021-10-12 RX ADMIN — PANTOPRAZOLE SODIUM 40 MG: 40 TABLET, DELAYED RELEASE ORAL at 08:28

## 2021-10-12 RX ADMIN — TRIAMCINOLONE ACETONIDE 40 MG: 40 INJECTION, SUSPENSION INTRA-ARTICULAR; INTRAMUSCULAR at 01:07

## 2021-10-12 RX ADMIN — KETOROLAC TROMETHAMINE 30 MG: 30 INJECTION, SOLUTION INTRAMUSCULAR; INTRAVENOUS at 01:07

## 2021-10-12 RX ADMIN — TECHNETIUM TC 99M SESTAMIBI 1 DOSE: 1 INJECTION INTRAVENOUS at 09:49

## 2021-10-12 RX ADMIN — ONDANSETRON 4 MG: 2 INJECTION INTRAMUSCULAR; INTRAVENOUS at 11:40

## 2021-10-12 NOTE — PROGRESS NOTES
[facility] Medicine Services  INPATIENT PROGRESS NOTE     LOS: 0 days   Patient Care Team:  Manolo Galarza MD as PCP - General    Chief Complaint:  Chest pain      Subjective     Interval History:     Patient Complaints: Patient seen and examined.  Patient resting comfortably patient denies any complaints.  No significant events reported overnight.    History taken from: Patient, nursing staff and chart reviewed.    Review of Systems:    Review of Systems   Constitutional: Negative for appetite change, chills, diaphoresis and fever.   HENT: Negative for congestion, rhinorrhea, sore throat and trouble swallowing.    Eyes: Negative for visual disturbance.   Respiratory: Negative for cough, chest tightness, shortness of breath and wheezing.    Cardiovascular: Positive for chest pain and palpitations. Negative for leg swelling.   Gastrointestinal: Negative for abdominal pain, blood in stool, diarrhea, nausea and vomiting.   Endocrine: Negative for cold intolerance and heat intolerance.   Genitourinary: Negative for decreased urine volume and difficulty urinating.   Musculoskeletal: Negative for back pain, gait problem and neck pain.   Skin: Negative for rash.   Neurological: Negative for dizziness, syncope, weakness, light-headedness, numbness and headaches.   Psychiatric/Behavioral: The patient is not nervous/anxious.    All other systems reviewed and are negative.        Objective     Vital Signs  Temp:  [97.9 °F (36.6 °C)-99 °F (37.2 °C)] 98.4 °F (36.9 °C)  Heart Rate:  [65-77] 67  Resp:  [15-20] 16  BP: ()/(51-85) 95/51    Physical Exam:   Physical Exam  Vitals and nursing note reviewed.   Constitutional:       Appearance: She is well-developed.   HENT:      Head: Normocephalic and atraumatic.      Nose: Nose normal.   Eyes:      Conjunctiva/sclera: Conjunctivae normal.      Pupils: Pupils are equal, round, and reactive to light.   Neck:      Thyroid: No  thyromegaly.      Vascular: No JVD.      Trachea: No tracheal deviation.   Cardiovascular:      Rate and Rhythm: Normal rate and regular rhythm.      Heart sounds: Normal heart sounds.   Pulmonary:      Effort: Pulmonary effort is normal. No respiratory distress.      Breath sounds: Normal breath sounds. No wheezing or rales.   Chest:      Chest wall: No tenderness.   Abdominal:      General: Bowel sounds are normal. There is no distension.      Palpations: Abdomen is soft.      Tenderness: There is no abdominal tenderness. There is no guarding or rebound.   Musculoskeletal:         General: Normal range of motion.      Cervical back: Normal range of motion and neck supple.   Lymphadenopathy:      Cervical: No cervical adenopathy.   Skin:     General: Skin is warm and dry.      Comments: Intact   Neurological:      Mental Status: She is alert and oriented to person, place, and time.      Cranial Nerves: No cranial nerve deficit.      Deep Tendon Reflexes: Reflexes are normal and symmetric.            Results Review:       Results from last 7 days   Lab Units 10/12/21  0548 10/10/21  1839 10/10/21  1142   SODIUM mmol/L 136 136 136   POTASSIUM mmol/L 4.0 4.2 4.2   CHLORIDE mmol/L 103 100 101   CO2 mmol/L 24.0 20.0* 24.0   BUN mg/dL 15 12 11   CREATININE mg/dL 0.85 0.83 0.78   GLUCOSE mg/dL 96 115* 120*   CALCIUM mg/dL 9.2 9.8 9.6   BILIRUBIN mg/dL  --   --  1.0   ALK PHOS U/L  --   --  54   ALT (SGPT) U/L  --   --  24   AST (SGOT) U/L  --   --  27       Results from last 7 days   Lab Units 10/12/21  0548 10/10/21  1839   PHOSPHORUS mg/dL 2.0* 3.0       Results from last 7 days   Lab Units 10/12/21  0548 10/11/21  0558 10/10/21  1142   WBC 10*3/mm3 7.54 8.37 11.23*   HEMOGLOBIN g/dL 10.3* 10.7* 12.0   HEMATOCRIT % 32.3* 32.3* 37.2   PLATELETS 10*3/mm3 180 178 192       Lab Results   Component Value Date    CKTOTAL 47 10/10/2021    CKMB 0.60 12/26/2018    TROPONINI <0.012 12/27/2018    TROPONINT <0.010 10/10/2021        CO2   Date Value Ref Range Status   10/12/2021 24.0 22.0 - 29.0 mmol/L Final              Imaging Results (Last 7 Days)     Procedure Component Value Units Date/Time    CT Angiogram Chest [[id number]] Collected: 10/10/21 1320     Updated: 10/10/21 1419    Narrative:      EXAM:  CT CHEST ANGIOGRAPHY WITH IV CONTRAST    ORDERING PROVIDER:  MICHELLE DELGADO    CLINICAL HISTORY:  Shortness of breath    COMPARISON:  12/28/2018    TECHNIQUE:   Chest CT was performed using a high resolution pulmonary  angiogram protocol with 58ml of Isovue-370 as IV contrast and  reformatted in the sagittal and coronal planes.     3-dimensional images also acquired with special processing of the  CT scan data with a specialized workstation for evaluation.    This examination was performed according to our departmental dose  optimization program which includes automated exposure control,  adjustment of the MA and kV according to patient size, and/or use  of iterative reconstruction technique.     FINDINGS:     LUNGS AND PLEURA: Mild probable dependent atelectasis versus  infiltrate in the lung bases. No evidence of pleural effusion. No  pneumothorax.    HEART: Prominent size and unremarkable configuration. No  pericardial effusion.     MEDIASTINUM AND MANDO: No significant adenopathy.     AORTA AND GREAT VESSELS: No aneurysm or dissection.     PULMONARY ARTERIES: No filling defects.     UPPER ABDOMEN: Unremarkable.    MUSCULOSKELETAL:  No aggressive osseous lesion.. Unremarkable  vertebral body height and alignment. No lytic or sclerotic  lesion. Grade 1 retrolisthesis of L1 on L2.    EXTRATHORACIC SOFT TISSUES: No axillary adenopathy. No mass.  Unremarkable supraclavicular soft tissues.       Impression:      1.  No evidence of pulmonary embolism.  2.  Mild probable dependent atelectasis versus infiltrate in the  bilateral lung bases.   3.  Grade 1 retrolisthesis of L1 on L2.      Electronically signed by:  Main Fong MD   10/10/2021 2:18 PM CDT  Workstation: [id number]    XR Chest 1 View [[id number]] Collected: 10/10/21 1200     Updated: 10/10/21 1229    Narrative:        PROCEDURE: Single chest view portable    REASON FOR EXAM:SOB    FINDINGS: Comparison exam dated December 26, 2018. Stable  prominent right epicardial fat pad as seen on prior CT. Cardiac  and pulmonary vasculature are normal. Lungs are clear. Pleural  spaces are normal. No acute osseous abnormality.      Impression:      No acute cardiopulmonary abnormality.    Electronically signed by:  Steve Nelson MD  10/10/2021 12:28 PM CDT  Workstation: [id number]         No results found for: ACANTHNAEG, AFBCX, BPERTUSSISCX, BLOODCX  No results found for: BCIDPCR, CXREFLEX, CSFCX, CULTURETIS  No results found for: CULTURES, HSVCX, URCX  No results found for: EYECULTURE, GCCX, HSVCULTURE, LABHSV  No results found for: LEGIONELLA, MRSACX, MUMPSCX, MYCOPLASCX  No results found for: NOCARDIACX, STOOLCX  No results found for: THROATCX, UNSTIMCULT, URINECX, CULTURE, VZVCULTUR  No results found for: VIRALCULTU, WOUNDCX     Medication Review:   Current Facility-Administered Medications   Medication Dose Route Frequency Provider Last Rate Last Admin   • acetaminophen (TYLENOL) tablet 650 mg  650 mg Oral Q6H PRN [place], Richie N, DO       • budesonide (PULMICORT) nebulizer solution 0.5 mg  0.5 mg Nebulization BID - RT [place] N, DO   0.5 mg at 10/12/21 0805   • calcium carbonate (TUMS) chewable tablet 500 mg (200 mg elemental)  2 tablet Oral BID PRN Jaclyn Richie N, DO       • enoxaparin (LOVENOX) syringe 40 mg  40 mg Subcutaneous Q24H Joshua Anayalan N, DO   40 mg at 10/12/21 0829   • HYDROcodone-acetaminophen (NORCO) 5-325 MG per tablet 1 tablet  1 tablet Oral Q6H PRN Richie Anaya N, DO   1 tablet at 10/12/21 1138   • levothyroxine (SYNTHROID, LEVOTHROID) tablet 25 mcg  25 mcg Oral Q AM Richie Anaya N, DO   25 mcg at 10/12/21 0535   • magnesium oxide (MAG-OX) tablet 400 mg   400 mg Oral Daily Jaclyn, Richie N, DO   400 mg at 10/12/21 0828   • Magnesium Sulfate 2 gram Bolus, followed by 8 gram infusion (total Mg dose 10 grams)- Mg less than or equal to 1mg/dL  2 g Intravenous PRN Joshua Anayalan N, DO        Or   • Magnesium Sulfate 2 gram / 50mL Infusion (GIVE X 3 BAGS TO EQUAL 6GM TOTAL DOSE) - Mg 1.1 - 1.5 mg/dl  2 g Intravenous PRN Jac[place], Richie N, DO        Or   • Magnesium Sulfate 4 gram infusion- Mg 1.6-1.9 mg/dL  4 g Intravenous PRN [place], Richie N, DO       • meclizine (ANTIVERT) tablet 25 mg  25 mg Oral TID PRN Richie Anaya N, DO   25 mg at 10/10/21 2149   • morphine injection 2 mg  2 mg Intravenous Q4H PRN Joshua Anayalan N, DO       • nebivolol (BYSTOLIC) tablet 5 mg  5 mg Oral Daily Richie Anaya N, DO   5 mg at 10/11/21 0911   • ondansetron (ZOFRAN) injection 4 mg  4 mg Intravenous Q6H PRN Joshua Anayalan N, DO   4 mg at 10/12/21 1140   • pantoprazole (PROTONIX) EC tablet 40 mg  40 mg Oral BID - RT Joshua Anayalan N, DO   40 mg at 10/12/21 0828   • potassium & sodium phosphates (PHOS-NAK) 280-160-250 MG packet - for Phosphorus less than 1.25 mg/dL  2 packet Oral Q6H PRN Richie Anaya N, DO        Or   • potassium & sodium phosphates (PHOS-NAK) 280-160-250 MG packet - for Phosphorus 1.25 - 2.5 mg/dL  2 packet Oral Q6H PRN Joshua Anayalan N, DO       • potassium chloride (KLOR-CON) packet 40 mEq  40 mEq Oral PRN Joshua Anayalan N, DO       • potassium chloride (MICRO-K) CR capsule 40 mEq  40 mEq Oral PRN Jac[place], Richie N, DO       • rosuvastatin (CRESTOR) tablet 5 mg  5 mg Oral Nightly Richie Anaya N, DO   5 mg at 10/11/21 2046   • sodium chloride 0.9 % flush 10 mL  10 mL Intravenous PRN [place], Richie N, DO       • sodium chloride 0.9 % flush 10 mL  10 mL Intravenous PRN [place] Richie N, DO       • sucralfate (CARAFATE) tablet 1 g  1 g Oral Q8H [place], Richie N, DO   1 g at 10/12/21 0535         Assessment/Plan       Chest pain          -Cardiology follow-up  appreciated.  -Patient scheduled for stress test earlier.  Awaiting test results.  -We'll get cardiology clearance prior to discharge.  -Clinically stable and improving.  -Continue home medications prior to hospitalization.  -DVT and GI prophylaxis in place.  -We'll continue monitoring patient in hospital setting and treat patient as course dictates.  -Please review orders for detailed plan of care.          This document has been electronically signed by Thalia Tucker MD on October 12, 2021 16:00 CDT        EMR Dragon/Transcription disclaimer:   Dictated utilizing Dragon dictation.

## 2021-10-12 NOTE — PROGRESS NOTES
Patient has been recommended to undergo myocardial perfusion scan.    Risk-benefit treatment option for the myocardial perfusion scan were discussed with the patient and an informed consent was obtained.    Myocardial perfusion scintigraphy (MPS)  was recommended to the patient as the best non-invasive modality for the assessment of obstructive CAD.  I  did spend some time discussing the procedure, as well as risks and benefits.  I also informed the patient that the risk of nonfatal MI or major cardiac complication is about  0.02%. The patient was also informed about the risks and benefits of MPS. Patient was also provided with a handout describing the test, as well as patient instructions prior to testing.       I also discussed the results of MPS as divided into risks.The NPV of this test is as high as 98%.  I also specifically discussed the risk of cardiac death with the following percentages:     Low-risk MPS:                          0.5% per year  Mildly abnormal MPS:              2.7%  Moderately abnormal:             2.9%  Severely abnormal:                 4.2%

## 2021-10-12 NOTE — CONSULTS
Cardiology Consultation Note.        Patient Name: Hallie Milan  Age/Sex: 81 y.o. female  : 1940  MRN: [id number]    Date of consultation: 10/11/2021  Consulting Physician: Jadon Matias MD  Primary care Physician: Manolo Galarza MD  Requesting Physician:  Thalia Tucker MD     Reason for consultation: Chest pain      Subjective:       Chief Complaint: Chest pain    History of Present Illness:  Hallie Milan is a 81 y.o. female     Body mass index is 31.14 kg/m². with a past medical history significant for negative myocardial perfusion scan in 2018, arterial hypertension, hypertensive heart disease, allergic rhinitis, hypothyroidism, hyperlipidemia, history of renal calculi, obesity with a body mask index of 31, gastroesophageal reflux disease, history of anxiety, post nasal drainage, temporomandibular joint dysfunction, who has had previous history of lip abscess with Methicillin Resistant Staphylococcus Aureus infection requiring incision and drainage with a nuclear Cardiolite stress test done in  which was negative for any evidence of any stress-induced ischemia treated medically.    Patient apparently was in the garden and was picking up pairs from the trees.  Patient subsequently had symptoms of chest pain.  Patient initially attributed it to muscle pull and strenuous activity.  Patient discomfort continued.  During the patient persistent discomfort patient was concerned and subsequently presented to the hospital.    Patient initial resting electrocardiogram did not show any acute ST-T wave changes.  Patient was subsequently hospitalized.    On questioning patient chest pain is partially pleuritic in nature and partially reproducible.  Patient does have risk factor for atherosclerotic coronary artery disease.    Patient on further questioning denies any nausea vomiting.  Patient denies any paroxysmal nocturnal dyspnea orthopnea.    Patient 10 point review of system except  for stated in the history of present is negative.      Concurrent medical History:  1. Chest tightness .    2.  Arterial hypertension.  3.  Hypertensive heart disease.  4.  Nonrheumatic mild mitral and nonrheumatic mild tricuspid regurgitation.  5.  Negative myocardial perfusion scan in 2018 for stress-induced ischemia.  6.  Allergic rhinitis.  7.  Gastroesophageal reflux disease.  8.  Anxiety and tremors.  9.  Temporomandibular joint dysfunction.  10.  History of renal calculi.  11.  Previous history of lip abscess requiring incision and drainage.  12.  History of MRSA infection  13.  History of benign positional vertigo  14.  Hypothyroidism.  15.  Hyperlipidemia.         PSH:   1.  Abscess on the left lip requiring incision and drainage by Dr. Stinson 2005   2.  Esophagogastroduodenoscopy.  3.  Colonoscopy.    4.  Cholecystectomy.           FH: Significant for father and mother who had CAD and 2 brothers who have CAD.      SH: Denies any tobacco or alcohol intake. Patient has been active.         CARDIAC RISK FACTORS:   1. Postmenopausal   2. Family history for CAD   3. Arterial hypertension   4.  Obesity.                   Allergies:  Allergies   Allergen Reactions   • Codeine Other (See Comments)     oversedation   • Minoxidil      Made her heart feel funny    • Claritin-D 12 Hour [Loratadine-Pseudoephedrine Er] Anxiety and Palpitations   • Prednisone Anxiety and Palpitations       Medication:  Medications Prior to Admission   Medication Sig Dispense Refill Last Dose   • budesonide (PULMICORT) 90 MCG/ACT inhaler Inhale 1 puff 2 (Two) Times a Day.   10/9/2021 at Unknown time   • Coenzyme Q10 (COQ-10) 100 MG capsule Take  by mouth.   10/9/2021 at Unknown time   • Magnesium 100 MG capsule Take 400 mg by mouth Daily.   10/9/2021 at Unknown time   • meclizine (ANTIVERT) 25 MG tablet Take 12.5-25 mg by mouth 3 (Three) Times a Day As Needed for dizziness.   10/9/2021 at Unknown time   • nebivolol (BYSTOLIC) 5 MG tablet  Take 5 mg by mouth Daily.   10/9/2021 at Unknown time   • Omega-3 Fatty Acids (FISH OIL) 500 MG capsule Take 900 mg by mouth 2 (Two) Times a Day.   10/9/2021 at Unknown time   • pantoprazole (PROTONIX) 40 MG EC tablet Take 40 mg by mouth 2 (Two) Times a Day.   10/9/2021 at Unknown time   • rosuvastatin (CRESTOR) 5 MG tablet Take 5 mg by mouth Every Evening.   10/9/2021 at Unknown time   • sucralfate (Carafate) 1 g tablet Take 1 tablet by mouth Every 8 (Eight) Hours.   10/9/2021 at Unknown time   • B COMPLEX VITAMINS PO Take 1,000 mcg by mouth 1 (One) Time Per Week.   Unknown at Unknown time   • levothyroxine (SYNTHROID, LEVOTHROID) 25 MCG tablet TAKE 1/2 TABLET BY MOUTH EVERY MORNING BEFORE BREAKFAST   Unknown at Unknown time   • MAGNESIUM GLUCONATE PO Take 250 mg by mouth Daily.              Review of Systems:       Constitutional:  Denies recent weight loss, weight gain, fever or chills, no change in exercise tolerance.     HENT:  Denies any hearing loss, epistaxis, hoarseness, or difficulty speaking.     Eyes: Wears eyeglasses or contact lenses     Respiratory:  Denies dyspnea with exertion,no cough, wheezing, or hemoptysis.     Cardiovascular: Positive for chest pain.  Negative for palpitations,orthopnea, PND, peripheral edema, syncope, or claudication.     Gastrointestinal:  Denies change in bowel habits, dyspepsia, ulcer disease, hematochezia, or melena.  No nausea, no vomiting, no hematemesis, no diarrhea or constipation.    Endocrine: Negative for cold intolerance, heat intolerance, polydipsia, polyphagia or polyuria. Denies any history of weight change or unintended weight loss.    Genitourinary: Negative for hematuria.  No frequent urination or nocturia.      Musculoskeletal: Denies any history of arthritic symptoms or back problems .  No joint pain, joint stiffness, joint swelling, muscle pain, muscle weakness or neck pain.    Skin:  Denies any change in hair or nails, rashes, or skin lesions.      Allergic/Immunologic: Negative.  Negative for environmental allergies, food allergies or immunocompromised state.     Neurological:  Denies any history of recurrent headaches, strokes, TIA, or seizure disorder.     Hematological: Denies excessive bleeding, easy bruising, fatigue, lymphadenopathy or petechiae or any bleeding disorders.     Psychiatric/Behavioral: Denies any history of depression, substance abuse, or change in cognitive function. Denies any psychomotor reaction or tangential thought.  No depression, homicidal ideations or suicidal ideations.          Objective:     Objective:  Temp:  [97.3 °F (36.3 °C)-99 °F (37.2 °C)] 99 °F (37.2 °C)  Heart Rate:  [] 73  Resp:  [15-20] 20  BP: (109-162)/(51-85) 142/66      Body mass index is 31.14 kg/m².           Physical Exam:   General Appearance:    Alert, oriented, cooperative, in no acute distress.   Head:    Normocephalic, atraumatic, without obvious abnormality.   Eyes:           ARLIN.  Lids and lashes normal, conjunctivae and sclerae normal, no icterus, no pallor.   Ears:    Ears appear intact with no abnormalities noted.   Throat:   Mucous membranes pink and moist.   Neck:   Supple, trachea midline, no carotid bruit, no organomegaly or JVD.   Lungs:     Clear to auscultation and percussion, respirations regular, even and unlabored. No wheezes, rales or rhonchi.    Heart:    Regular rhythm and normal rate, normal S1 and S2, no murmur, no gallop, no rub, no click.   Abdomen:     Soft, nontender, nondistended, no guarding, no rebound tenderness, normal bowel sounds in all four quadrants, no masses, liver and spleen nonpalpable.   Genitalia:    Deferred.   Extremities:   Moves all extremities well, no edema, no cyanosis, no  redness, no clubbing.   Pulses:   Pulses palpable and equal bilaterally.   Skin:   Moist and warm. No bleeding, bruising or rash.   Neurologic/Psychiatric:   Alert and oriented to person, place, and time.  Motor, power and tone  in upper and lower extremities are grossly intact. No focal neurological deficits. Normal cognitive function. No psychomotor reaction or tangential thought. No depression, homicidal ideations and suicidal ideations.       Medication Review:   Current Facility-Administered Medications   Medication Dose Route Frequency Provider Last Rate Last Admin   • acetaminophen (TYLENOL) tablet 650 mg  650 mg Oral Q6H PRN Richie Anaya, DO       • budesonide (PULMICORT) nebulizer solution 0.5 mg  0.5 mg Nebulization BID - RT Richie Anaya DO   0.5 mg at 10/11/21 1936   • calcium carbonate (TUMS) chewable tablet 500 mg (200 mg elemental)  2 tablet Oral BID PRN Richie Anaya, DO       • enoxaparin (LOVENOX) syringe 40 mg  40 mg Subcutaneous Q24H Richie Anaya, DO   40 mg at 10/11/21 0911   • HYDROcodone-acetaminophen (NORCO) 5-325 MG per tablet 1 tablet  1 tablet Oral Q6H PRN Richie Anaya DO   1 tablet at 10/11/21 0536   • levothyroxine (SYNTHROID, LEVOTHROID) tablet 25 mcg  25 mcg Oral Q AM Richie Anaya, DO   25 mcg at 10/11/21 0536   • magnesium oxide (MAG-OX) tablet 400 mg  400 mg Oral Daily Richie Anaya DO   400 mg at 10/11/21 0913   • Magnesium Sulfate 2 gram Bolus, followed by 8 gram infusion (total Mg dose 10 grams)- Mg less than or equal to 1mg/dL  2 g Intravenous PRN Richie Anaya, DO        Or   • Magnesium Sulfate 2 gram / 50mL Infusion (GIVE X 3 BAGS TO EQUAL 6GM TOTAL DOSE) - Mg 1.1 - 1.5 mg/dl  2 g Intravenous PRN Richie Anaya, DO        Or   • Magnesium Sulfate 4 gram infusion- Mg 1.6-1.9 mg/dL  4 g Intravenous PRN Richie Anaya, DO       • meclizine (ANTIVERT) tablet 25 mg  25 mg Oral TID PRN Richie Anaya, DO   25 mg at 10/10/21 2149   • morphine injection 2 mg  2 mg Intravenous Q4H PRN Richie Anaya DO       • nebivolol (BYSTOLIC) tablet 5 mg  5 mg Oral Daily Richie Anaya DO   5 mg at 10/11/21 0911   • ondansetron (ZOFRAN) injection 4 mg  4 mg Intravenous Q6H PRN  Joshua Anayalan N, DO       • pantoprazole (PROTONIX) EC tablet 40 mg  40 mg Oral BID - RT [place], Richie N, DO   40 mg at 10/11/21 2046   • potassium & sodium phosphates (PHOS-NAK) 280-160-250 MG packet - for Phosphorus less than 1.25 mg/dL  2 packet Oral Q6H PRN [place], Richie N, DO        Or   • potassium & sodium phosphates (PHOS-NAK) 280-160-250 MG packet - for Phosphorus 1.25 - 2.5 mg/dL  2 packet Oral Q6H PRN [place], Richie N, DO       • potassium chloride (KLOR-CON) packet 40 mEq  40 mEq Oral PRN [place], Richie N, DO       • potassium chloride (MICRO-K) CR capsule 40 mEq  40 mEq Oral PRN [place], Richie N, DO       • rosuvastatin (CRESTOR) tablet 5 mg  5 mg Oral Nightly [place], Richie N, DO   5 mg at 10/11/21 2046   • sodium chloride 0.9 % flush 10 mL  10 mL Intravenous PRN [place] Richie N, DO       • sodium chloride 0.9 % flush 10 mL  10 mL Intravenous PRN [place], Richie N, DO       • sucralfate (CARAFATE) tablet 1 g  1 g Oral Q8H Joshua Anayalan N, DO   1 g at 10/11/21 1811       Lab Review:     Results from last 7 days   Lab Units 10/10/21  1839 10/10/21  1142 10/10/21  1142   SODIUM mmol/L 136   < > 136   POTASSIUM mmol/L 4.2   < > 4.2   CHLORIDE mmol/L 100   < > 101   CO2 mmol/L 20.0*   < > 24.0   BUN mg/dL 12   < > 11   CREATININE mg/dL 0.83   < > 0.78   CALCIUM mg/dL 9.8   < > 9.6   BILIRUBIN mg/dL  --   --  1.0   ALK PHOS U/L  --   --  54   ALT (SGPT) U/L  --   --  24   AST (SGOT) U/L  --   --  27   GLUCOSE mg/dL 115*   < > 120*    < > = values in this interval not displayed.     Results from last 7 days   Lab Units 10/10/21  1839 10/10/21  1548 10/10/21  1142   CK TOTAL U/L  --   --  47   TROPONIN T ng/mL <0.010 <0.010 <0.010         Results from last 7 days   Lab Units 10/11/21  0558   WBC 10*3/mm3 8.37   HEMOGLOBIN g/dL 10.7*   HEMATOCRIT % 32.3*   PLATELETS 10*3/mm3 178                           EKG:   ECG/EMG Results (last 24 hours)     Procedure Component Value Units Date/Time    SCANNED EKG  [[id number]] Resulted: 10/10/21     Updated: 10/11/21 1416    SCANNED EKG [[id number]] Resulted: 10/10/21     Updated: 10/11/21 1416          ECHO:  Results for orders placed during the hospital encounter of 12/26/18    Adult Transthoracic Echo Complete W/ Cont if Necessary Per Protocol    Interpretation Summary  · Left atrial cavity size is mildly dilated.  · Left ventricular wall thickness is consistent with mild concentric hypertrophy.  · Mild tricuspid valve regurgitation is present.  · Mild aortic valve regurgitation is present.       Imaging:  Imaging Results (Last 24 Hours)     ** No results found for the last 24 hours. **          I personally viewed and interpreted the patient's EKG/Telemetry data.    Assessment:   1.  Chest pain.  2.  Arterial hypertension.  3.  Hypertensive heart disease.  4.  Gastroesophageal reflux disease          Plan:   1. Symptoms of chest pain described as chest pain pleuritic in nature with strong family history for atherosclerotic coronary artery disease with symptoms of shortness of breath. She has been recommended to undergo Lexiscan Cardiolite stress test and echocardiogram. Risks, benefits and treatment options for the Lexiscan Cardiolite stress test were discussed with the patient and informed consent was obtained from the patient for the Lexiscan Cardiolite stress test. She had been recommended to undergo Lexiscan Cardiolite stress test. Further recommendation to follow after the echocardiogram and Lexiscan Cardiolite stress test.       Patient has been recommended to undergo myocardial perfusion scan.    Risk-benefit treatment option for the myocardial perfusion scan were discussed with the patient and an informed consent was obtained.    Myocardial perfusion scintigraphy (MPS)  was recommended to the patient as the best non-invasive modality for the assessment of obstructive CAD.  I  did spend some time discussing the procedure, as well as risks and benefits.  I also  informed the patient that the risk of nonfatal MI or major cardiac complication is about  0.02%. The patient was also informed about the risks and benefits of MPS. Patient was also provided with a handout describing the test, as well as patient instructions prior to testing.       I also discussed the results of MPS as divided into risks.The NPV of this test is as high as 98%.  I also specifically discussed the risk of cardiac death with the following percentages:     Low-risk MPS:                          0.5% per year  Mildly abnormal MPS:              2.7%  Moderately abnormal:             2.9%  Severely abnormal:                 4.2%    2.  Arterial hypertension with a blood pressure of 118/70. Her blood pressure at home has been normal. She has been recommended to decrease her salt intake and would continue her on Bystolic 5 mg once a day and would follow the heart rate and blood pressure closely. She has been counseled to decrease her salt intake.       3. Hyperlipidemia. She has not been on any lipid lowering agent. Her lipid profile will be checked today and would start her on lipid lowering agent due to her strong family history.       4. History of chronic allergic rhinitis. She is currently on LoHist long with Zantac and has been followed by Dr. Parker.       5. History of lip abscess with Methicillin Resistant Staphylococcus Aureus requiring incision and drainage. She has not complained of any signs of fever or chills or any swelling of the lip. She has no evidence of any focal infection.     6.  Hypothyroidism.  Patient is currently on thyroid supplement    7.  Hyperlipidemia.  Patient is currently on Crestor 5 mg daily.  Patient has been counseled on low-fat low-cholesterol diet.    8.  Benign positional vertigo.  Patient is on meclizine and has been followed by the primary care physician.    9.  Gastroesophageal reflux disease.  Patient is on Protonix 40 mg daily.    Thank you for the  consultation.    The above plan of management were discussed with the patient          Time: Time spent in face-to-face evaluation of greater than 50 minutes interacting, formulating, examining and discussing the plan with the patient with 50% of greater time spent in face-to-face interaction.    Electronically signed by Jadon Matias MD, 10/11/21, 11:29 PM CDT.    Dictated utilizing Dragon dictation.

## 2021-10-12 NOTE — PLAN OF CARE
Goal Outcome Evaluation:      Echo and stress test performed today. Waiting to be resulted

## 2021-10-13 NOTE — PROGRESS NOTES
Cardiology Progress Note     LOS: 0 days   Patient Care Team:  Manolo Galarza MD as PCP - General    Subjective:    Chart reviewed. Patient seen and examined. Patient denies any chest pain, shortness of breath, or palpitation.  Patient underwent a myocardial perfusion scan.  Patient myocardial perfusion scan was not suggestive of any reversible ischemia.  Patient at the present time would be treated medically.  Patient transthoracic echocardiogram had revealed preserved left ventricular systolic function.    Objective:  Temp:  [97.9 °F (36.6 °C)-99 °F (37.2 °C)] 98.4 °F (36.9 °C)  Heart Rate:  [65-83] 83  Resp:  [15-20] 18  BP: ()/(51-70) 95/51    Intake/Output Summary (Last 24 hours) at 10/12/2021 2000  Last data filed at 10/12/2021 1303  Gross per 24 hour   Intake 240 ml   Output 600 ml   Net -360 ml       Physical Exam:   General Appearance:    Alert, oriented, cooperative, in no acute distress.   Head:    Normocephalic, atraumatic, without obvious abnormality   Eyes:             ARLIN. Lids and lashes normal, conjunctivae and sclerae normal, no icterus, no pallor.   Ears:    Ears appear intact with no abnormalities noted.   Throat:   Mucous membranes pink and moist.   Neck:  Supple, trachea midline, no carotid bruit, no organomegaly or JVD.   Lungs:    Clear to auscultation and percussion.  Respirations regular, even and unlabored. No wheezes, rales, or rhonchi.    Heart:    Regular rhythm and normal rate, normal S1 and S2, no      murmur, no gallop, no rub, no click.   Abdomen:    Soft, nontender, nondistended, no guarding, no rebound tenderness. Normal bowel sounds in all four quadrants, no masses, liver and spleen nonpalpable.    Genitalia:    Deferred.   Extremities:   Moves all extremities well, no edema, no cyanosis, no       redness, no clubbing.   Pulses:   Pulses palpable and equal bilaterally.   Skin:   Moist and warm. No bleeding, bruising or rash.   Neurologic/Psychiatric:   Alert and  oriented to person, place, and time.  Motor, power and tone in upper and lower extremities are grossly intact.  No focal neurological deficits. Normal cognitive function. No psychomotor reaction or tangential thought. No depression, homicidal ideations and suicidal ideations.          Results Review:    Results from last 7 days   Lab Units 10/12/21  0548 10/10/21  1839 10/10/21  1142   SODIUM mmol/L 136   < > 136   POTASSIUM mmol/L 4.0   < > 4.2   CHLORIDE mmol/L 103   < > 101   CO2 mmol/L 24.0   < > 24.0   BUN mg/dL 15   < > 11   CREATININE mg/dL 0.85   < > 0.78   CALCIUM mg/dL 9.2   < > 9.6   BILIRUBIN mg/dL  --   --  1.0   ALK PHOS U/L  --   --  54   ALT (SGPT) U/L  --   --  24   AST (SGOT) U/L  --   --  27   GLUCOSE mg/dL 96   < > 120*    < > = values in this interval not displayed.     Results from last 7 days   Lab Units 10/10/21  1839 10/10/21  1548 10/10/21  1142   CK TOTAL U/L  --   --  47   TROPONIN T ng/mL <0.010 <0.010 <0.010         Results from last 7 days   Lab Units 10/12/21  0548   WBC 10*3/mm3 7.54   HEMOGLOBIN g/dL 10.3*   HEMATOCRIT % 32.3*   PLATELETS 10*3/mm3 180                           ECHO:  Results for orders placed during the hospital encounter of 12/26/18    Adult Transthoracic Echo Complete W/ Cont if Necessary Per Protocol    Interpretation Summary  · Left atrial cavity size is mildly dilated.  · Left ventricular wall thickness is consistent with mild concentric hypertrophy.  · Mild tricuspid valve regurgitation is present.  · Mild aortic valve regurgitation is present.      ECG 12 Lead   ED Interpretation   Aicha Luna APRN     10/10/2021  6:40 PM   ECG 12 Lead         Date/Time: 10/10/2021 5:02 PM   Performed by: Aicha Luna APRN   Authorized by: Aicha Luna APRN    Interpreted by physician   Comparison: compared with previous ECG    Rhythm: sinus bradycardia   Rate: bradycardic   BPM: 59   Comments: Left axis deviation   Pulmonary disease patter    Minimal volage criteria for LVH         ECG 12 Lead         SCANNED EKG   Final Result      SCANNED EKG   Final Result           Medication Review:   Current Facility-Administered Medications   Medication Dose Route Frequency Provider Last Rate Last Admin   • acetaminophen (TYLENOL) tablet 650 mg  650 mg Oral Q6H PRN Richie Anaya N, DO       • budesonide (PULMICORT) nebulizer solution 0.5 mg  0.5 mg Nebulization BID - RT Joshua Anayalan N, DO   0.5 mg at 10/12/21 1933   • calcium carbonate (TUMS) chewable tablet 500 mg (200 mg elemental)  2 tablet Oral BID PRN Joshua Anayalan N, DO       • enoxaparin (LOVENOX) syringe 40 mg  40 mg Subcutaneous Q24H Richie Anaya N, DO   40 mg at 10/12/21 0829   • HYDROcodone-acetaminophen (NORCO) 5-325 MG per tablet 1 tablet  1 tablet Oral Q6H PRN Richie Anaya N, DO   1 tablet at 10/12/21 1138   • levothyroxine (SYNTHROID, LEVOTHROID) tablet 25 mcg  25 mcg Oral Q AM Richie Anaya N, DO   25 mcg at 10/12/21 0535   • magnesium oxide (MAG-OX) tablet 400 mg  400 mg Oral Daily Richie Anaya N, DO   400 mg at 10/12/21 0828   • Magnesium Sulfate 2 gram Bolus, followed by 8 gram infusion (total Mg dose 10 grams)- Mg less than or equal to 1mg/dL  2 g Intravenous PRN Richie Anaya N, DO        Or   • Magnesium Sulfate 2 gram / 50mL Infusion (GIVE X 3 BAGS TO EQUAL 6GM TOTAL DOSE) - Mg 1.1 - 1.5 mg/dl  2 g Intravenous PRN Richie Anaya N, DO        Or   • Magnesium Sulfate 4 gram infusion- Mg 1.6-1.9 mg/dL  4 g Intravenous PRN [place] N, DO       • meclizine (ANTIVERT) tablet 25 mg  25 mg Oral TID PRN Richie Anaya N, DO   25 mg at 10/10/21 2149   • morphine injection 2 mg  2 mg Intravenous Q4H PRN Richie Anaya N, DO       • nebivolol (BYSTOLIC) tablet 5 mg  5 mg Oral Daily Richie Anaya N, DO   5 mg at 10/11/21 0911   • ondansetron (ZOFRAN) injection 4 mg  4 mg Intravenous Q6H PRN Richie Anaya DO   4 mg at 10/12/21 1140   • pantoprazole (PROTONIX) EC tablet 40 mg  40  mg Oral BID - RT Jaclyn, Richie N, DO   40 mg at 10/12/21 0828   • potassium & sodium phosphates (PHOS-NAK) 280-160-250 MG packet - for Phosphorus less than 1.25 mg/dL  2 packet Oral Q6H PRN [place], Richie N, DO        Or   • potassium & sodium phosphates (PHOS-NAK) 280-160-250 MG packet - for Phosphorus 1.25 - 2.5 mg/dL  2 packet Oral Q6H PRN Jaclyn, Richie N, DO       • potassium chloride (KLOR-CON) packet 40 mEq  40 mEq Oral PRN Jaclyn, Richie N, DO       • potassium chloride (MICRO-K) CR capsule 40 mEq  40 mEq Oral PRN Jaclyn, Richie N, DO       • rosuvastatin (CRESTOR) tablet 5 mg  5 mg Oral Nightly Jaclyn, Richie N, DO   5 mg at 10/11/21 2046   • sodium chloride 0.9 % flush 10 mL  10 mL Intravenous PRN Jaclyn, Richie N, DO       • sodium chloride 0.9 % flush 10 mL  10 mL Intravenous PRN [place], Richie N, DO       • sucralfate (CARAFATE) tablet 1 g  1 g Oral Q8H Jaclyn, Richie N, DO   1 g at 10/12/21 0535       Assessment and Plan:      Chest pain    GERD (gastroesophageal reflux disease)    Benign essential HTN    PUD (peptic ulcer disease)    Acquired hypothyroidism    B12 deficiency  1.  Chest pain.  Patient had atypical symptoms of chest pain.  Patient had negative troponin.  Patient underwent a myocardial perfusion scan.  Patient myocardial perfusion scan did not reveal of any evidence of any stress-induced ischemia.  Patient at the present time has been recommended medical management and would not be subjected to any invasive evaluation from the cardiac standpoint.    2.  Arterial hypertension.  Patient blood pressure has remained stable.  Patient would be continued on the present dose of the Bystolic.  Patient has been counseled to decrease her salt intake.    3.  Hyperlipidemia.  Patient has been counseled on low-fat low-cholesterol diet and to undergo lipid profile check.  Patient is currently on Crestor 5 mg daily.      4.  Obesity with a body mass index of 30.  Patient has been counseled on weight  reduction lifestyle modification and dietary restriction.    The above plan of management were discussed with the patient          Electronically signed by Jadon Matias MD, 10/12/21, 8:00 PM CDT.      Time: Time spent on face-to-face interaction 20 minutes    Dictated utilizing Dragon dictation.

## 2021-10-13 NOTE — DISCHARGE SUMMARY
Date of Discharge:  10/12/2021    Discharge Diagnosis: Chest pain     Presenting Problem/History of Present Illness  Chest pain, unspecified type [R07.9]     Hospital Course  80 yo F with PMH of vertigo, asthma presented from urgent care where she was being evaluated for chest pain. 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 Pain worsened this morning, prompting her to go to urgent care. Pain is worse with movement, but relieved with pain meds she got in the ER. Hsa some dyspnea with walking at baseline and ankle swelling when she stands too long, but none recently. No diaphoresis, nausea, vomiting. Doesn't smoke or use drugs. Pain described as stabbing. Present to bilat chest, but more superior almost near armpits.      She had both COVID vaccines around Jan/Feb and is wanting to get booster shot. She had flu shot on 10/7/21.     Patient admitted to hospital for chest pain.  Patient's condition improved.  Patient has been having chest tightness and chest pain in retrosternal region on movement and exertion.  Patient denies any nausea or vomiting.  Patient was further evaluate by cardiology services.  Patient had scheduled stress test and echocardiogram done was reported as negative.  Cardiology had cleared the patient.  Patient is currently stable, able to be discharged.  Patient states that she has been chest pain-free during this hospitalization.    Procedures Performed         Consults:   Consults     Date and Time Order Name Status Description    10/11/2021  9:40 AM Inpatient Cardiology Consult Completed           Pertinent Test Results:   Lab Results (last 24 hours)     Procedure Component Value Units Date/Time    Phosphorus [[id number]]  (Abnormal) Collected: 10/12/21 0548    Specimen: Blood Updated: 10/12/21 0705     Phosphorus 2.0 mg/dL     Basic Metabolic Panel [[id number]]  (Normal) Collected: 10/12/21 0548    Specimen: Blood Updated: 10/12/21 0658     Glucose  96 mg/dL      BUN 15 mg/dL      Creatinine 0.85 mg/dL      Sodium 136 mmol/L      Potassium 4.0 mmol/L      Chloride 103 mmol/L      CO2 24.0 mmol/L      Calcium 9.2 mg/dL      eGFR Non African Amer 65 mL/min/1.73      BUN/Creatinine Ratio 17.6     Anion Gap 9.0 mmol/L     Narrative:      GFR Normal >60  Chronic Kidney Disease <60  Kidney Failure <15      CBC & Differential [[id number]]  (Abnormal) Collected: 10/12/21 0548    Specimen: Blood Updated: 10/12/21 0636    Narrative:      The following orders were created for panel order CBC & Differential.  Procedure                               Abnormality         Status                     ---------                               -----------         ------                     CBC Auto Differential[[id number]]        Abnormal            Final result                 Please view results for these tests on the individual orders.    CBC Auto Differential [[id number]]  (Abnormal) Collected: 10/12/21 0548    Specimen: Blood Updated: 10/12/21 0636     WBC 7.54 10*3/mm3      RBC 3.55 10*6/mm3      Hemoglobin 10.3 g/dL      Hematocrit 32.3 %      MCV 91.0 fL      MCH 29.0 pg      MCHC 31.9 g/dL      RDW 13.9 %      RDW-SD 47.0 fl      MPV 10.9 fL      Platelets 180 10*3/mm3      Neutrophil % 66.6 %      Lymphocyte % 17.0 %      Monocyte % 13.5 %      Eosinophil % 2.1 %      Basophil % 0.4 %      Immature Grans % 0.4 %      Neutrophils, Absolute 5.02 10*3/mm3      Lymphocytes, Absolute 1.28 10*3/mm3      Monocytes, Absolute 1.02 10*3/mm3      Eosinophils, Absolute 0.16 10*3/mm3      Basophils, Absolute 0.03 10*3/mm3      Immature Grans, Absolute 0.03 10*3/mm3      nRBC 0.0 /100 WBC             Condition on Discharge:  stable  Vital Signs  Temp:  [97.9 °F (36.6 °C)-99 °F (37.2 °C)] 98.4 °F (36.9 °C)  Heart Rate:  [65-73] 66  Resp:  [15-20] 16  BP: ()/(51-70) 95/51    Physical Exam:   Physical Exam  Vitals and nursing note reviewed.   Constitutional:       Appearance: She is  well-developed.   HENT:      Head: Normocephalic and atraumatic.      Nose: Nose normal.   Eyes:      Conjunctiva/sclera: Conjunctivae normal.      Pupils: Pupils are equal, round, and reactive to light.   Neck:      Thyroid: No thyromegaly.      Vascular: No JVD.      Trachea: No tracheal deviation.   Cardiovascular:      Rate and Rhythm: Normal rate and regular rhythm.      Heart sounds: Normal heart sounds.   Pulmonary:      Effort: Pulmonary effort is normal. No respiratory distress.      Breath sounds: Normal breath sounds. No wheezing or rales.   Chest:      Chest wall: No tenderness.   Abdominal:      General: Bowel sounds are normal. There is no distension.      Palpations: Abdomen is soft.      Tenderness: There is no abdominal tenderness. There is no guarding or rebound.   Musculoskeletal:         General: Normal range of motion.      Cervical back: Normal range of motion and neck supple.   Lymphadenopathy:      Cervical: No cervical adenopathy.   Skin:     General: Skin is warm and dry.      Comments: Intact   Neurological:      Mental Status: She is alert and oriented to person, place, and time.      Cranial Nerves: No cranial nerve deficit.      Deep Tendon Reflexes: Reflexes are normal and symmetric.         LABS Reviewed Prior to Discharge:  Results from last 7 days   Lab Units 10/12/21  0548 10/10/21  1839 10/10/21  1142   SODIUM mmol/L 136 136 136   POTASSIUM mmol/L 4.0 4.2 4.2   CHLORIDE mmol/L 103 100 101   CO2 mmol/L 24.0 20.0* 24.0   BUN mg/dL 15 12 11   CREATININE mg/dL 0.85 0.83 0.78   GLUCOSE mg/dL 96 115* 120*   CALCIUM mg/dL 9.2 9.8 9.6   BILIRUBIN mg/dL  --   --  1.0   ALK PHOS U/L  --   --  54   ALT (SGPT) U/L  --   --  24   AST (SGOT) U/L  --   --  27       Results from last 7 days   Lab Units 10/12/21  0548 10/10/21  1839   PHOSPHORUS mg/dL 2.0* 3.0       Results from last 7 days   Lab Units 10/12/21  0548 10/11/21  0558 10/10/21  1142   WBC 10*3/mm3 7.54 8.37 11.23*   HEMOGLOBIN g/dL  10.3* 10.7* 12.0   HEMATOCRIT % 32.3* 32.3* 37.2   PLATELETS 10*3/mm3 180 178 192             Discharge Disposition  Home or Self Care    Discharge Medications     Discharge Medications      Continue These Medications      Instructions Start Date   B COMPLEX VITAMINS PO   1,000 mcg, Oral, Weekly      budesonide 90 MCG/ACT inhaler  Commonly known as: PULMICORT   1 puff, Inhalation, 2 Times Daily - RT      Carafate 1 g tablet  Generic drug: sucralfate   1 tablet, Oral, Every 8 Hours      CoQ-10 100 MG capsule   Oral      fish oil 500 MG capsule capsule   900 mg, Oral, 2 Times Daily      levothyroxine 25 MCG tablet  Commonly known as: SYNTHROID, LEVOTHROID   TAKE 1/2 TABLET BY MOUTH EVERY MORNING BEFORE BREAKFAST      Magnesium 100 MG capsule   400 mg, Oral, Daily      MAGNESIUM GLUCONATE PO   250 mg, Oral, Daily      meclizine 25 MG tablet  Commonly known as: ANTIVERT   12.5-25 mg, Oral, 3 Times Daily PRN      nebivolol 5 MG tablet  Commonly known as: BYSTOLIC   5 mg, Oral, Daily      pantoprazole 40 MG EC tablet  Commonly known as: PROTONIX   40 mg, Oral, 2 Times Daily - RT      rosuvastatin 5 MG tablet  Commonly known as: CRESTOR   5 mg, Oral, Every Evening             Discharge Diet:   Diet Instructions     Diet: Cardiac      Discharge Diet: Cardiac          Activity at Discharge:   Activity Instructions     Activity as Tolerated      Measure Blood Pressure      Measure Weight            Follow-up Appointments  No future appointments.  Additional Instructions for the Follow-ups that You Need to Schedule     Discharge Follow-up with PCP   As directed       Currently Documented PCP:    Manolo Galarza MD    PCP Phone Number:    [phone]     Follow Up Details: 3 days               Test Results Pending at Discharge           This document has been electronically signed by Thalia Tucker MD on October 12, 2021 19:22 CDT        Time: Discharge 32 min        EMR Dragon/Transcription disclaimer:   Dictated  utilizing Dragon dictation.

## 2021-10-16 LAB
BH CV REST NUCLEAR ISOTOPE DOSE: 10.4 MCI
BH CV STRESS BP STAGE 1: NORMAL
BH CV STRESS COMMENTS STAGE 1: NORMAL
BH CV STRESS DOSE REGADENOSON STAGE 1: 0.4
BH CV STRESS DURATION MIN STAGE 1: 0
BH CV STRESS DURATION SEC STAGE 1: 10
BH CV STRESS HR STAGE 1: 87
BH CV STRESS NUCLEAR ISOTOPE DOSE: 36.4 MCI
BH CV STRESS PROTOCOL 1: NORMAL
BH CV STRESS RECOVERY BP: NORMAL MMHG
BH CV STRESS RECOVERY HR: 99 BPM
BH CV STRESS STAGE 1: 1
LV EF NUC BP: 80 %
MAXIMAL PREDICTED HEART RATE: 141 BPM
PERCENT MAX PREDICTED HR: 78.01 %
STRESS BASELINE BP: NORMAL MMHG
STRESS BASELINE HR: 87 BPM
STRESS PERCENT HR: 92 %
STRESS POST ESTIMATED WORKLOAD: 1 METS
STRESS POST PEAK BP: NORMAL MMHG
STRESS POST PEAK HR: 110 BPM
STRESS TARGET HR: 120 BPM

## 2021-10-20 ENCOUNTER — TRANSCRIBE ORDERS (OUTPATIENT)
Dept: ADMINISTRATIVE | Facility: HOSPITAL | Age: 81
End: 2021-10-20

## 2021-10-20 DIAGNOSIS — R53.83 FATIGUE, UNSPECIFIED TYPE: ICD-10-CM

## 2021-10-20 DIAGNOSIS — J45.30 MILD PERSISTENT ASTHMA WITHOUT COMPLICATION: Primary | ICD-10-CM

## 2021-10-20 DIAGNOSIS — R06.00 DYSPNEA, UNSPECIFIED TYPE: ICD-10-CM

## 2021-10-25 PROBLEM — J45.30 MILD PERSISTENT ASTHMA WITHOUT COMPLICATION: Status: ACTIVE | Noted: 2021-09-13

## 2021-10-25 PROBLEM — Z77.22 HISTORY OF SECOND HAND SMOKE EXPOSURE: Status: ACTIVE | Noted: 2021-02-04

## 2021-10-25 LAB
QT INTERVAL: 422 MS
QTC INTERVAL: 417 MS

## 2021-10-25 PROCEDURE — 87635 SARS-COV-2 COVID-19 AMP PRB: CPT | Performed by: NURSE PRACTITIONER

## 2021-10-29 ENCOUNTER — DOCUMENTATION (OUTPATIENT)
Dept: PULMONOLOGY | Facility: CLINIC | Age: 81
End: 2021-10-29

## 2021-10-29 NOTE — PROGRESS NOTES
Pt here for a Full PFT at the request of Dr. Manolo Galarza, her family physician.     Upon entry to the testing room, pt informed Tech that she had been diagnosed with Bilateral Lower Lobe Pneumonia on Monday.     I asked how the pt was feeling and she said she still didn't feel very good. I told her that if we performed the test today, that 1) it would most likely be inaccurate because of the lower lobe pneumonia and 2) if she felt bad, it was likely she would feel worse after the test, as well as she wouldn't give me a good enough effort for accurate readings.     Pt asked if she could wait and take the test later and I told her it was up to her if she wanted to do the test today or wait.     Pt decided to wait and we rescheduled her appointment for November 16.     I tried to reach Dr. Galarza's office to explain what was going on, but no answer at his nurse's desk.     Pt said she would call his office and make them aware of the situation.         Halima Up  Cardiovascular Tech  10/29/2021  1025am

## 2021-11-30 ENCOUNTER — OFFICE VISIT (OUTPATIENT)
Dept: PULMONOLOGY | Facility: CLINIC | Age: 81
End: 2021-11-30

## 2021-11-30 DIAGNOSIS — J45.30 MILD PERSISTENT ASTHMA WITHOUT COMPLICATION: ICD-10-CM

## 2021-11-30 DIAGNOSIS — R53.83 FATIGUE, UNSPECIFIED TYPE: ICD-10-CM

## 2021-11-30 DIAGNOSIS — R06.00 DYSPNEA, UNSPECIFIED TYPE: ICD-10-CM

## 2021-11-30 PROCEDURE — 94729 DIFFUSING CAPACITY: CPT | Performed by: INTERNAL MEDICINE

## 2021-11-30 PROCEDURE — 94727 GAS DIL/WSHOT DETER LNG VOL: CPT | Performed by: INTERNAL MEDICINE

## 2021-11-30 PROCEDURE — 94010 BREATHING CAPACITY TEST: CPT | Performed by: INTERNAL MEDICINE

## 2021-11-30 NOTE — PROGRESS NOTES
Full PFT w/out bronchodilator performed (no post)    Good patient effort and cooperation.     Ordered by Dr. Manool Galarza, read by Dr. Eloy Fisher.

## 2022-04-01 ENCOUNTER — HOSPITAL ENCOUNTER (EMERGENCY)
Facility: HOSPITAL | Age: 82
Discharge: HOME | End: 2022-04-01
Attending: FAMILY MEDICINE | Admitting: FAMILY MEDICINE

## 2022-04-01 ENCOUNTER — APPOINTMENT (OUTPATIENT)
Dept: GENERAL RADIOLOGY | Facility: HOSPITAL | Age: 82
End: 2022-04-01

## 2022-04-01 VITALS
BODY MASS INDEX: 29.59 KG/M2 | TEMPERATURE: 97.8 F | HEART RATE: 66 BPM | SYSTOLIC BLOOD PRESSURE: 100 MMHG | HEIGHT: 63 IN | OXYGEN SATURATION: 95 % | DIASTOLIC BLOOD PRESSURE: 49 MMHG | WEIGHT: 167 LBS | RESPIRATION RATE: 18 BRPM

## 2022-04-01 DIAGNOSIS — S22.31XA CLOSED FRACTURE OF ONE RIB OF RIGHT SIDE, INITIAL ENCOUNTER: Primary | ICD-10-CM

## 2022-04-01 PROCEDURE — 71101 X-RAY EXAM UNILAT RIBS/CHEST: CPT

## 2022-04-01 PROCEDURE — 96375 TX/PRO/DX INJ NEW DRUG ADDON: CPT

## 2022-04-01 PROCEDURE — 25010000002 ONDANSETRON PER 1 MG: Performed by: PHYSICIAN ASSISTANT

## 2022-04-01 PROCEDURE — 25010000002 MORPHINE PER 10 MG: Performed by: FAMILY MEDICINE

## 2022-04-01 PROCEDURE — 96374 THER/PROPH/DIAG INJ IV PUSH: CPT

## 2022-04-01 PROCEDURE — 99283 EMERGENCY DEPT VISIT LOW MDM: CPT

## 2022-04-01 RX ORDER — CLONIDINE HYDROCHLORIDE 0.2 MG/1
0.2 TABLET ORAL ONCE
Status: COMPLETED | OUTPATIENT
Start: 2022-04-01 | End: 2022-04-01

## 2022-04-01 RX ORDER — HYDROCODONE BITARTRATE AND ACETAMINOPHEN 5; 325 MG/1; MG/1
1 TABLET ORAL EVERY 6 HOURS PRN
Qty: 12 TABLET | Refills: 0 | Status: SHIPPED | OUTPATIENT
Start: 2022-04-01

## 2022-04-01 RX ORDER — ONDANSETRON 2 MG/ML
4 INJECTION INTRAMUSCULAR; INTRAVENOUS ONCE
Status: COMPLETED | OUTPATIENT
Start: 2022-04-01 | End: 2022-04-01

## 2022-04-01 RX ORDER — HYDROCODONE BITARTRATE AND ACETAMINOPHEN 5; 325 MG/1; MG/1
1 TABLET ORAL ONCE
Status: COMPLETED | OUTPATIENT
Start: 2022-04-01 | End: 2022-04-01

## 2022-04-01 RX ADMIN — HYDROCODONE BITARTRATE AND ACETAMINOPHEN 1 TABLET: 5; 325 TABLET ORAL at 09:59

## 2022-04-01 RX ADMIN — MORPHINE SULFATE 4 MG: 4 INJECTION INTRAVENOUS at 11:09

## 2022-04-01 RX ADMIN — ONDANSETRON 4 MG: 2 INJECTION INTRAMUSCULAR; INTRAVENOUS at 11:09

## 2022-04-01 RX ADMIN — CLONIDINE HYDROCHLORIDE 0.2 MG: 0.2 TABLET ORAL at 10:28

## 2022-04-01 NOTE — ED NOTES
"Pt states she is starting to \"feel some relief\" from pain after pain medication. No s/s of adverse reaction to medication at this time  "

## 2022-04-01 NOTE — ED PROVIDER NOTES
Subjective   Patient presents to emergency department for right-sided rib pain secondary to fall this morning.  States she fell onto a vertical 2 x 4 on a wheelchair ramp which is not finished yet.  Pain is aggravated by movement and alleviated by laying down and resting.  She denies shortness of breath but states it is painful to breathe deeply.      History provided by:  Patient   used: No        Review of Systems   Constitutional: Negative for chills and fever.   HENT: Negative for sore throat and trouble swallowing.    Eyes: Negative for visual disturbance.   Respiratory: Negative for cough and shortness of breath.    Cardiovascular: Positive for chest pain (right lateral rib tenderness).   Gastrointestinal: Negative for abdominal pain, nausea and vomiting.   Skin: Negative for color change and wound.   Neurological: Negative for dizziness, syncope and headaches.       Past Medical History:   Diagnosis Date   • Allergic rhinitis    • Benign paroxysmal positional vertigo    • Chronic laryngitis    • GERD (gastroesophageal reflux disease)    • Hypertension    • Hypothyroidism    • Nephrolithiasis    • Temporomandibular joint disorder    • Vertigo        Allergies   Allergen Reactions   • Codeine Other (See Comments)     oversedation   • Minoxidil Unknown - High Severity     Made her heart feel funny    • Claritin-D 12 Hour [Loratadine-Pseudoephedrine Er] Anxiety and Palpitations   • Prednisone Anxiety and Palpitations       Past Surgical History:   Procedure Laterality Date   • CHOLECYSTECTOMY     • COLONOSCOPY N/A 12/11/2017   • ENDOSCOPY N/A 12/11/2017   • ENDOSCOPY N/A 2/8/2018   • ENDOSCOPY N/A 7/23/2020       Family History   Problem Relation Age of Onset   • Hypertension Mother    • Heart disease Father    • Cancer Brother        Social History     Socioeconomic History   • Marital status:    Tobacco Use   • Smoking status: Never Smoker   • Smokeless tobacco: Never Used   Substance  "and Sexual Activity   • Alcohol use: No   • Drug use: No   • Sexual activity: Not Currently           Objective      /53   Pulse 60   Temp 97.8 °F (36.6 °C) (Infrared)   Resp 18   Ht 158.8 cm (62.5\")   Wt 75.8 kg (167 lb)   SpO2 93%   BMI 30.06 kg/m²     Physical Exam  Vitals and nursing note reviewed.   Constitutional:       General: She is not in acute distress.     Appearance: Normal appearance. She is normal weight. She is not ill-appearing.   HENT:      Head: Normocephalic and atraumatic.      Mouth/Throat:      Pharynx: Oropharynx is clear.   Eyes:      Conjunctiva/sclera: Conjunctivae normal.   Cardiovascular:      Rate and Rhythm: Normal rate and regular rhythm.      Pulses: Normal pulses.      Heart sounds: Normal heart sounds.   Pulmonary:      Effort: Pulmonary effort is normal. No respiratory distress.      Breath sounds: Normal breath sounds. No wheezing.   Chest:      Chest wall: Tenderness present. No crepitus.       Skin:     General: Skin is warm.      Capillary Refill: Capillary refill takes less than 2 seconds.   Neurological:      Mental Status: She is alert. Mental status is at baseline.   Psychiatric:         Mood and Affect: Mood normal.         Behavior: Behavior normal.         Thought Content: Thought content normal.         Procedures           ED Course  ED Course as of 04/01/22 1236   Fri Apr 01, 2022   1234 Reviewed eKASPER #[id number]  [GRIFFIN]      ED Course User Index  [GRIFFIN] Andriy Yang PA-C      XR Ribs Right With PA Chest    Result Date: 4/1/2022  Narrative: EXAMINATION:  XR RIBS RIGHT W PA CHEST CLINICAL HISTORY:  81 years Female,fall, right rib pain COMPARISON:  Chest x-ray dated 10/25/2021 FINDINGS:  Possible nondisplaced fracture through the right 12th rib posteriorly. Stable cardiomegaly. Mild central pulmonary vascular congestion. No pleural effusion or pneumothorax. Prominence of the fat pad along the right heart, present previously.     Impression: 1. " Possible nondisplaced fracture through the right 12th rib posteriorly. 2. Stable cardiomegaly with mild central pulmonary vascular congestion. Electronically signed by:  Ward Grigsby MD  4/1/2022 10:14 AM CDT Workstation: [id number]                                               [facility]    Final diagnoses:   Closed fracture of one rib of right side, initial encounter       ED Disposition  ED Disposition     ED Disposition   Discharge    Condition   Stable    Comment   --             Manolo Galarza MD  [address] [place] [address]  [phone]    Call in 1 day      [facility] EMERGENCY DEPARTMENT  [address]  [facility] [address] [phone]  Go to   if symptoms worsen.         Medication List      New Prescriptions    HYDROcodone-acetaminophen 5-325 MG per tablet  Commonly known as: NORCO  Take 1 tablet by mouth Every 6 (Six) Hours As Needed for Moderate Pain .           Where to Get Your Medications      These medications were sent to [facility]/pharmacy #[id number] - [place], [place] - [address] [place] [address] - [phone]  - [phone] [address] [address]    Phone: [phone]   · HYDROcodone-acetaminophen 5-325 MG per tablet          Andriy Yang PA-C  04/01/22 0492

## 2022-09-16 ENCOUNTER — PREP FOR SURGERY (OUTPATIENT)
Dept: OTHER | Facility: HOSPITAL | Age: 82
End: 2022-09-16

## 2022-09-16 DIAGNOSIS — K22.4 DYSKINESIA OF ESOPHAGUS: ICD-10-CM

## 2022-09-16 DIAGNOSIS — K27.9 PEPTIC ULCER DISEASE: ICD-10-CM

## 2022-09-16 DIAGNOSIS — K21.9 GASTROESOPHAGEAL REFLUX DISEASE WITHOUT ESOPHAGITIS: Primary | ICD-10-CM

## 2022-09-16 DIAGNOSIS — R49.9 ABNORMAL VOICE: ICD-10-CM

## 2022-09-16 RX ORDER — DEXTROSE AND SODIUM CHLORIDE 5; .45 G/100ML; G/100ML
30 INJECTION, SOLUTION INTRAVENOUS CONTINUOUS PRN
Status: CANCELLED | OUTPATIENT
Start: 2022-09-29

## 2022-09-29 ENCOUNTER — ANESTHESIA EVENT (OUTPATIENT)
Dept: GASTROENTEROLOGY | Facility: HOSPITAL | Age: 82
End: 2022-09-29

## 2022-09-29 ENCOUNTER — HOSPITAL ENCOUNTER (OUTPATIENT)
Facility: HOSPITAL | Age: 82
Setting detail: HOSPITAL OUTPATIENT SURGERY
Discharge: HOME | End: 2022-09-29
Attending: INTERNAL MEDICINE | Admitting: INTERNAL MEDICINE

## 2022-09-29 ENCOUNTER — ANESTHESIA (OUTPATIENT)
Dept: GASTROENTEROLOGY | Facility: HOSPITAL | Age: 82
End: 2022-09-29

## 2022-09-29 VITALS
HEART RATE: 59 BPM | HEIGHT: 63 IN | SYSTOLIC BLOOD PRESSURE: 158 MMHG | BODY MASS INDEX: 29.88 KG/M2 | TEMPERATURE: 97.3 F | WEIGHT: 168.6 LBS | OXYGEN SATURATION: 97 % | RESPIRATION RATE: 20 BRPM | DIASTOLIC BLOOD PRESSURE: 56 MMHG

## 2022-09-29 DIAGNOSIS — R49.9 ABNORMAL VOICE: ICD-10-CM

## 2022-09-29 DIAGNOSIS — K27.9 PEPTIC ULCER DISEASE: ICD-10-CM

## 2022-09-29 DIAGNOSIS — K22.4 DYSKINESIA OF ESOPHAGUS: ICD-10-CM

## 2022-09-29 DIAGNOSIS — K21.9 GASTROESOPHAGEAL REFLUX DISEASE WITHOUT ESOPHAGITIS: ICD-10-CM

## 2022-09-29 PROCEDURE — 88305 TISSUE EXAM BY PATHOLOGIST: CPT

## 2022-09-29 PROCEDURE — 25010000002 PROPOFOL 10 MG/ML EMULSION

## 2022-09-29 RX ORDER — DEXTROSE AND SODIUM CHLORIDE 5; .45 G/100ML; G/100ML
30 INJECTION, SOLUTION INTRAVENOUS CONTINUOUS PRN
Status: DISCONTINUED | OUTPATIENT
Start: 2022-09-29 | End: 2022-09-29 | Stop reason: HOSPADM

## 2022-09-29 RX ORDER — LIDOCAINE HYDROCHLORIDE 20 MG/ML
INJECTION, SOLUTION INTRAVENOUS AS NEEDED
Status: DISCONTINUED | OUTPATIENT
Start: 2022-09-29 | End: 2022-09-29 | Stop reason: SURG

## 2022-09-29 RX ORDER — PROPOFOL 10 MG/ML
VIAL (ML) INTRAVENOUS AS NEEDED
Status: DISCONTINUED | OUTPATIENT
Start: 2022-09-29 | End: 2022-09-29 | Stop reason: SURG

## 2022-09-29 RX ADMIN — DEXTROSE AND SODIUM CHLORIDE 30 ML/HR: 5; 450 INJECTION, SOLUTION INTRAVENOUS at 08:27

## 2022-09-29 RX ADMIN — PROPOFOL 60 MG: 10 INJECTION, EMULSION INTRAVENOUS at 09:02

## 2022-09-29 RX ADMIN — PROPOFOL 10 MG: 10 INJECTION, EMULSION INTRAVENOUS at 09:05

## 2022-09-29 RX ADMIN — PROPOFOL 20 MG: 10 INJECTION, EMULSION INTRAVENOUS at 09:03

## 2022-09-29 RX ADMIN — LIDOCAINE HYDROCHLORIDE 80 MG: 20 INJECTION, SOLUTION INTRAVENOUS at 09:02

## 2022-09-29 NOTE — ANESTHESIA PREPROCEDURE EVALUATION
Anesthesia Evaluation     Patient summary reviewed and Nursing notes reviewed   no history of anesthetic complications:  NPO Solid Status: > 8 hours  NPO Liquid Status: > 2 hours           Airway   Mallampati: II  TM distance: >3 FB  Neck ROM: full  no difficulty expected  Dental    (+) upper dentures    Comment: Upper partial      Pulmonary     breath sounds clear to auscultation  (+) asthma,    ROS comment: Chronic laryngitis  Cardiovascular     Rhythm: regular  Rate: normal    (+) hypertension well controlled, hyperlipidemia,       Neuro/Psych  (+) dizziness/light headedness (begin paroxysmal positional vertigo), weakness, psychiatric history Anxiety,    GI/Hepatic/Renal/Endo    (+) obesity,  GERD well controlled, PUD,  renal disease stones, thyroid problem hypothyroidism    Musculoskeletal (-) negative ROS        ROS comment: TMJ  Abdominal   (+) obese,    Substance History - negative use     OB/GYN negative ob/gyn ROS         Other - negative ROS                         Anesthesia Plan    ASA 3     general   total IV anesthesia  intravenous induction     Anesthetic plan, risks, benefits, and alternatives have been provided, discussed and informed consent has been obtained with: patient.

## 2022-09-29 NOTE — ANESTHESIA POSTPROCEDURE EVALUATION
Patient: Hallie Milan    Procedure Summary     Date: 09/29/22 Room / Location: [facility] ENDOSCOPY 2 / [facility] ENDOSCOPY    Anesthesia Start: 0855 Anesthesia Stop: 0908    Procedure: ESOPHAGOGASTRODUODENOSCOPY 11:30 (N/A ) Diagnosis:       Gastroesophageal reflux disease without esophagitis      Dyskinesia of esophagus      Peptic ulcer disease      Abnormal voice      (Gastroesophageal reflux disease without esophagitis [K21.9])      (Dyskinesia of esophagus [K22.4])      (Peptic ulcer disease [K27.9])      (Abnormal voice [R49.9])    Surgeons: Curtis Rick DO Provider: Anne Marc CRNA    Anesthesia Type: general ASA Status: 3          Anesthesia Type: general    Vitals  No vitals data found for the desired time range.          Post Anesthesia Care and Evaluation    Patient location during evaluation: bedside  Patient participation: waiting for patient participation  Level of consciousness: responsive to verbal stimuli  Pain management: adequate    Airway patency: patent  Anesthetic complications: No anesthetic complications  PONV Status: none  Cardiovascular status: acceptable and blood pressure returned to baseline  Respiratory status: acceptable and room air  Hydration status: acceptable    Comments: ---------------------------               09/29/22                      0803         ---------------------------   BP:        (!) 219/104      Pulse:         55           Resp:          18           Temp:   97.1 °F (36.2 °C)   SpO2:          98%         ---------------------------

## 2022-09-30 LAB — REF LAB TEST METHOD: NORMAL

## 2022-10-10 ENCOUNTER — OFFICE VISIT (OUTPATIENT)
Dept: OTOLARYNGOLOGY | Facility: CLINIC | Age: 82
End: 2022-10-10

## 2022-10-10 VITALS — HEIGHT: 62 IN | OXYGEN SATURATION: 97 % | BODY MASS INDEX: 30.91 KG/M2 | WEIGHT: 168 LBS | HEART RATE: 58 BPM

## 2022-10-10 DIAGNOSIS — R49.0 MUSCLE TENSION DYSPHONIA: ICD-10-CM

## 2022-10-10 DIAGNOSIS — J38.01 VOCAL CORD PARALYSIS, UNILATERAL PARTIAL: Primary | ICD-10-CM

## 2022-10-10 DIAGNOSIS — R13.13 PHARYNGEAL DYSPHAGIA: ICD-10-CM

## 2022-10-10 PROCEDURE — 31575 DIAGNOSTIC LARYNGOSCOPY: CPT | Performed by: OTOLARYNGOLOGY

## 2022-10-10 RX ORDER — FAMOTIDINE 40 MG/1
TABLET, FILM COATED ORAL
COMMUNITY
Start: 2022-09-13

## 2022-10-10 RX ORDER — SOLIFENACIN SUCCINATE 5 MG/1
TABLET, FILM COATED ORAL
COMMUNITY
Start: 2022-09-01

## 2022-10-10 RX ORDER — SERTRALINE HYDROCHLORIDE 25 MG/1
TABLET, FILM COATED ORAL
COMMUNITY
Start: 2022-09-22

## 2022-10-10 NOTE — PROGRESS NOTES
Chief complaint: Laryngeal edema    Assessment and Plan:  82Fwith possible aspiration and left TVF incomplete paralysis on FFL with supraglottic squeeze on phonation    -I will obtain a swallow test to further evaluate her complaints of dysphagia and possible aspiration with eating and drinking  -We will call her with the results of this test  -I will refer her for speech therapy to help with both her voice and swallowing complaints  -Follow-up as needed    History of present illness:    Ms. Milan is an 82 F with PMH including asthma senting for evaluation of supraglottic edema seen on recent EGD.  The patient reports she has had a 3-year of slowly progressive worsening vocal projection, decreased voice strength, and mild gravelly quality.  She denies any heartburn, globus, or indigestion.  She does endorse coughing and choking when she eats and drinks this does not seem to be worse with any particular consistency.  She has had 2 episodes of pneumonia in the last 1 year, she is not sure which side of pneumonia this was.  She states that sometimes she loses her voice entirely.  She denies dyspnea on exertion or change in breathing or noisy breathing.  She denies any rocio dysphagia or odynophagia.  No ear pain or pressure.  She does not smoke and has not smoked.    Vital Signs   Vitals:    10/10/22 0842   Pulse: 58   SpO2: 97%       Physical Exam:  General: NAD, awake and alert  Voice: asthenic, decreased pitch variation, mild intermittent breathy quality with intermittent glottic breaks, mild shimmer present.  Head: normocephalic, atraumatic  Eyes: EOMI, sclerae white, conjunctivae pink  Ears: pinnae intact without masses or lesions   Right: TM intact without injection or effusion   Left: TM intact without injection or effusion  Nose: external straight without deformity   Mucosa pink, not edematous. No polyps seen. No purulence.   Septum: grossly midline   Turbinates: not hypertrophied  OC/OP: mucosa moist and  pink, no masses or lesions, tongue is midline and mobile. Tonsils 1+ without exudate. Uvula single and elevates symmetrically.  Neck: supple, no masses or lesions. Thyroid without palpable masses.  Neuro: no focal deficits    Procedure: Flexible Fiberoptic Laryngoscopy  Indications: dysphagia, dysphonia  Anesthesia: Local  Surgeon: Ivis Duarte MD  EBL: none  Complications: none    Description:  Informed consent was verbally obtained. The nose was topically decongested with Neosynephrine and anesthetized with 4% lidocaine. The flexible endoscope was placed into the nasal cavity, and advanced to visualize the nasopharynx, oropharynx, hypopharynx, and larynx. The endoscope was removed at the conclusion of the exam.  Findings are as below:    Nasal cavity: Normal septum, normal turbinates, no evidence of polyps  Nasopharynx: Normal adenoid area and normal Eustachian tubes. No masses or lesions.  Oropharynx/Hypopharynx: BOT without masses, lesions, or edema. No masses or lesions of hypopharynx. No pooling of secretions. Posterior pharyngeal wall with without erythema, edema, or cobblestoning. Lateral pharyngeal squeeze with swallow right > left.  Larynx: Vallecula is normal.  Epiglottis thin and crisp. Supraglottis without masses, lesions, or edema.  Visualized subglottis without masses or lesions.  Interarytenoid area without pachydermia or scarring. There is supraglottic squeeze with phonation L>R. True vocal folds mobile with phonation bilaterally - left with decreased ROM- incomplete closure ~50% of the time, about 50% opening with mild arytenoid hooding.  Mucosa normal. No mucus stranding.  No masses or lesions.    Results Review:  Gastroenterology note and scanned imagery for EGD report from 9/29/2022 reviewed today and demonstrates mild supraglottic edema that appears diffuse with mild chronic irritation of the mucosal areas.  No findings of gastritis or esophagitis were noted at that encounter.    Review of  Systems:  Positive ROS items: Sore throat, trouble swallowing, choking, cough, wheezing, shortness of breath, palpitations, cold intolerance, neck pain, neck stiffness, dizziness, weakness, tremors, and easy bruising or bleeding.  Otherwise, a 14 system ROS is negative except as pertinent positives are mentioned above.    Histories:  Allergies   Allergen Reactions   • Codeine Other (See Comments)     oversedation   • Minoxidil Unknown - High Severity     Made her heart feel funny    • Claritin-D 12 Hour [Loratadine-Pseudoephedrine Er] Anxiety and Palpitations   • Prednisone Anxiety and Palpitations       Prior to Admission medications    Medication Sig Start Date End Date Taking? Authorizing Provider   albuterol sulfate  (90 Base) MCG/ACT inhaler INHALE 1 PUFF INTO THE LUNGS 2 TIMES DAILY AS NEEDED 12/16/21  Yes Emergency, Nurse Epic, RN   B COMPLEX VITAMINS PO Take 1,000 mcg by mouth 1 (One) Time Per Week.   Yes ProviderFarzaneh MD   budesonide (PULMICORT) 90 MCG/ACT inhaler Inhale 1 puff 2 (Two) Times a Day.   Yes ProviderFarzaneh MD   busPIRone (BUSPAR) 10 MG tablet Take 0.5-1 tablets by mouth 3 (Three) Times a Day. 3/25/22  Yes Vladimir Hercules MD   Coenzyme Q10 (COQ-10) 100 MG capsule Take  by mouth. 5/8/20  Yes Emergency, Nurse Caro RN   famotidine (PEPCID) 40 MG tablet TAKE 1 TABLET BY MOUTH NIGHTLY AS NEEDED FOR HEARTBURN. 9/13/22  Yes ProviderFarzaneh MD   guaiFENesin (MUCINEX) 600 MG 12 hr tablet Take 1 tablet by mouth 2 (Two) Times a Day. 10/25/21  Yes Jo Argueta APRN   HYDROcodone-acetaminophen (NORCO) 5-325 MG per tablet Take 1 tablet by mouth Every 6 (Six) Hours As Needed for Moderate Pain . 4/1/22  Yes Simone Whitney MD   ipratropium-albuterol (DUO-NEB) 0.5-2.5 mg/3 ml nebulizer Inhale 3 mL. 10/18/21 10/14/22 Yes Emergency, Nurse Caro RN   levothyroxine (SYNTHROID, LEVOTHROID) 25 MCG tablet TAKE 1/2 TABLET BY MOUTH EVERY MORNING BEFORE BREAKFAST 8/1/19  Yes  Emergency, Nurse MEME Elizondo   magnesium oxide (MAG-OX) 400 MG tablet Take 1 tablet by mouth Daily. 2/4/22  Yes Emergency, Nurse Caro RN   meclizine (ANTIVERT) 25 MG tablet Take 12.5-25 mg by mouth 3 (Three) Times a Day As Needed for dizziness.   Yes Provider, Historical, MD   nebivolol (BYSTOLIC) 5 MG tablet Take 1 tablet by mouth Daily. 1/13/22  Yes Emergency, Nurse Caro RN   Omega-3 Fatty Acids (FISH OIL) 500 MG capsule Take 900 mg by mouth 2 (Two) Times a Day.   Yes Provider, Historical, MD   pantoprazole (PROTONIX) 20 MG EC tablet Take 20 mg by mouth Daily. 3/1/22 3/2/23 Yes Emergency, Nurse Caro RN   rOPINIRole (REQUIP) 0.5 MG tablet  3/24/22  Yes Emergency, Nurse Caro RN   rosuvastatin (CRESTOR) 5 MG tablet Take 1 tablet by mouth Every Evening. 2/3/22  Yes Emergency, Nurse Caro RN   sertraline (ZOLOFT) 25 MG tablet TAKE 1 TABLET (25 MG TOTAL) BY MOUTH DAILY. 9/22/22  Yes Provider, MD Farzaneh   solifenacin (VESICARE) 5 MG tablet TAKE 1 TABLET (5 MG TOTAL) BY MOUTH DAILY. 9/1/22  Yes Provider, MD Farzaneh   sucralfate (CARAFATE) 1 g tablet Take 1 tablet by mouth Every 8 (Eight) Hours. 7/6/20  Yes Emergency, Nurse Epic, RN       Past Medical History:   Diagnosis Date   • Allergic rhinitis    • Benign paroxysmal positional vertigo    • Chronic laryngitis    • GERD (gastroesophageal reflux disease)    • Hypertension    • Hypothyroidism    • Nephrolithiasis    • Temporomandibular joint disorder    • Vertigo        Past Surgical History:   Procedure Laterality Date   • CHOLECYSTECTOMY     • COLONOSCOPY N/A 12/11/2017   • ENDOSCOPY N/A 12/11/2017   • ENDOSCOPY N/A 2/8/2018   • ENDOSCOPY N/A 7/23/2020   • ENDOSCOPY N/A 9/29/2022    Procedure: ESOPHAGOGASTRODUODENOSCOPY 11:30;  Surgeon: Curtis Rick DO;  Location: [facility] ENDOSCOPY;  Service: Gastroenterology;  Laterality: N/A;       Social History     Socioeconomic History   • Marital status:    Tobacco Use   • Smoking status: Never   • Smokeless  tobacco: Never   Substance and Sexual Activity   • Alcohol use: No   • Drug use: No   • Sexual activity: Not Currently       Family History   Problem Relation Age of Onset   • Hypertension Mother    • Heart disease Father    • Cancer Brother              This document has been electronically signed by Ivis Duarte MD on October 10, 2022 08:52 CDT

## 2022-10-26 ENCOUNTER — HOSPITAL ENCOUNTER (OUTPATIENT)
Dept: GENERAL RADIOLOGY | Facility: HOSPITAL | Age: 82
Discharge: HOME | End: 2022-10-26
Admitting: OTOLARYNGOLOGY

## 2022-10-26 ENCOUNTER — HOSPITAL ENCOUNTER (OUTPATIENT)
Dept: SPEECH THERAPY | Facility: HOSPITAL | Age: 82
Setting detail: THERAPIES SERIES
Discharge: HOME | End: 2022-10-26

## 2022-10-26 DIAGNOSIS — R13.13 PHARYNGEAL DYSPHAGIA: ICD-10-CM

## 2022-10-26 DIAGNOSIS — J38.01 VOCAL CORD PARALYSIS, UNILATERAL PARTIAL: ICD-10-CM

## 2022-10-26 DIAGNOSIS — R49.0 MUSCLE TENSION DYSPHONIA: ICD-10-CM

## 2022-10-26 PROCEDURE — 92611 MOTION FLUOROSCOPY/SWALLOW: CPT | Performed by: SPEECH-LANGUAGE PATHOLOGIST

## 2022-10-26 PROCEDURE — 63710000001 BARIUM SULFATE 96 % RECONSTITUTED SUSPENSION: Performed by: OTOLARYNGOLOGY

## 2022-10-26 PROCEDURE — A9270 NON-COVERED ITEM OR SERVICE: HCPCS | Performed by: OTOLARYNGOLOGY

## 2022-10-26 PROCEDURE — 92610 EVALUATE SWALLOWING FUNCTION: CPT | Performed by: SPEECH-LANGUAGE PATHOLOGIST

## 2022-10-26 PROCEDURE — 74230 X-RAY XM SWLNG FUNCJ C+: CPT

## 2022-10-26 RX ADMIN — BARIUM SULFATE 20 ML: 960 POWDER, FOR SUSPENSION ORAL at 09:44

## 2022-10-26 NOTE — THERAPY EVALUATION
"Speech Language Pathology   [facility] FEES / Discharge Summary  [facility]       Patient Name: Hallie Milan  : 1940  MRN: [id number]    Today's Date: 10/26/2022      Visit Date: 10/26/2022     SPEECH PATHOLOGY MODIFIED BARIUM SWALLOW STUDY     Chief Complaint:  Coughing w/and w/o food and liquids; throat burning; reports \"choking\" 2x per meal  Allergies: NKFA  Present diet textures:  Regular solids/thin liquids  Radiologist:  Dr. Nelson    Views: Patient seated at 90 degrees in:    _X_lateral view    __A/P    Ability to follow instructions: __Excellent        _X_Good          __Fair  __Poor      Dentition: _X_Natural  __Dentures   __Edentulous         __Partials    Presence of Oral Motor Weakness: No    The following consistencies were given, with symptoms as noted:  Consistency Method Labial Seal Oral Prep AP Transit Premature Spillage Delayed Swallow Reflex Laryngeal Penetration Aspiration Pooling Valleculae Pooling pyriform sinuses   Thin   cup    Yes; to pyriform sinuses        Puree spoon    Yes; to level of vallecula    before    Peaches spoon    Yes; to level of vallecula    before    Velasquez Cracker     Yes; to level of vallecula    before      Summary:  [facility] completed this date.  Pt demonstrated premature spillage w/thin liquids and all solid consistencies.  Premature spillage of the liquids reached the level of the pyriform sinuses; however, no swallow delay noted.  Bolus reached the level of the vallecula w/all solid consistencies w/timely initiation of the swallow.  No aspiration or penetration occured during study.  Cricopharyngeal bar noted.  Pt able to clear entire bolus w/o residue for all consistencies trialed.  SLP recommends continued current diet of regular solids w/thin liquids.  SLP did recommend strategies of oral prep set and small bites/sips to reduce premature spillage of liquids and solids into the pharynx.  Pt verbalized understanding.  SLP offered to step out of fluro room to " educate ; however, pt reported that it was not necessary.  SLP to contact MD skaggs/results and recommendations.      Impairments:  _X_Premature loss of bolus  __Reduced velopharyngeal closure  __Decreased lingual propulsion  __Reduced epiglottic inversion  __Decreased hyolaryngeal elevation  __Reduced laryngeal closure  __Reduced esophageal sphincter opening      Penetration-Aspiration Scale Rating:     Category Score Descriptions   No penetration or aspiration 1 Contrast does not enter the airway   Penetration 2 Contrast enters the airway, remains above vocal folds; no residue    3 Contrast remains above vocal folds; visible residue remains    4 Contrast contacts vocal folds; no residue    5 Contrast contacts vocal folds; visible residue remains   Aspiration 6 Contrast passes glottis; no subglottic residue visible    7 Contrast passes glottis; visible subglottic residue despite patient's response    8 Contrast passes glottis; visible subglottic residue; absent patient response   Aspiration:  __Prior __During __After the swallow  Cough: __Delayed __Immediate   __Absent with penetration/aspiration  Cough response:  __Weak __Strong    Rubio Assessment of Swallowing Ability (MASA):     Dysphagia Scale Rating:    Dysphagia Rating Scale Explanation   0   Normal swallowing mechanism     1 Minimal Dysphagia- video swallow shows slight deviance from a normal swallow. Patient may report change in sensation during swallow. No change in diet is required.     2 Mild Dysphagia- oropharyngeal dysphagia present, which can be managed by specific swallow suggestions. Slight modification in consistency of diet may be indicated.      3 Mild-Moderate Dysphagia- potential for aspiration exists but is diminished by specific swallow techniques and a modified diet. Time for eating is significantly increased; thus supplemental nutrition may be indicated.      4 Moderate Dysphagia- significant potential for aspiration exists. Trace  "aspiration of one or more consistencies may be seen under videofluoroscopy. Patient may eat certain consistencies by using specific techniques to minimize potential for aspiration and/or facilitate swallowing. Supervision at mealtimes required. May require supplemental nutrition orally or via feeding tube.      5 Moderately Severe Dysphagia- patient aspirates 5% to 10% on one or more consistencies, with potential for aspiration on all consistencies. Potential for aspiration minimized by specific swallow instructions. Cough reflex absent or non-protective. Alterative mode of feeding required to maintain patient's nutritional needs. If pulmonary status is compromised, \"nothing by mouth\" may be indicated.      6 Severe Dysphagia- more than 10% aspiration for all consistencies. \"Nothing by mouth\" recommended.        Recommendation:  __Non-oral nutrition/hydration  __Trial feeding with Speech Pathologist ONLY  _X_May have PO nutrition/hydration to include:   _X_Regular   __Mechanical soft   __Soft   __ Puree   _X_Thin Liquids   __Nectar thick liquids   __ Honey thick liquid    Compensatory Swallow Strategies suggested:   _X_sit 90 degrees for all PO    _X_small bites   _X_small sips   __cyclic eating (2 bites/1 sip)   __eat/drink slowly   __chew food well   __empty mouth each time between bites   __swallow __ times after each __bite __sip __at the end of the meal   __check mouth for pocketing   __supervision at all meals for compliance with compensatory swallow strategies.           Visit Dx:     ICD-10-CM ICD-9-CM   1. Vocal cord paralysis, unilateral partial  J38.01 478.31   2. Pharyngeal dysphagia  R13.13 787.23       Patient Active Problem List   Diagnosis   • GERD (gastroesophageal reflux disease)   • Benign paroxysmal positional vertigo   • Morgagni hernia   • Class 1 obesity due to excess calories with serious comorbidity and body mass index (BMI) of 31.0 to 31.9 in adult   • Benign essential HTN   • PUD (peptic " ulcer disease)   • Chest pain on breathing   • Chest pain   • RLL pneumonia   • Atelectasis of right lung   • Acquired hypothyroidism   • B12 deficiency   • Bilateral leg weakness   • Diverticulosis of colon without diverticulitis   • Environmental and seasonal allergies   • MAXIMINO (generalized anxiety disorder)   • History of chest pain   • History of colonic polyps   • History of laparoscopic cholecystectomy   • History of pneumonia   • Mild intermittent asthma without complication   • Mixed hyperlipidemia   • OAB (overactive bladder)   • Gastritis   • Duodenal ulcer   • History of second hand smoke exposure   • Screen for colon cancer   • Mild persistent asthma without complication   • Dyskinesia of esophagus   • Abnormal voice        Past Medical History:   Diagnosis Date   • Allergic rhinitis    • Benign paroxysmal positional vertigo    • Chronic laryngitis    • GERD (gastroesophageal reflux disease)    • Hypertension    • Hypothyroidism    • Nephrolithiasis    • Temporomandibular joint disorder    • Vertigo         Past Surgical History:   Procedure Laterality Date   • CHOLECYSTECTOMY     • COLONOSCOPY N/A 12/11/2017   • ENDOSCOPY N/A 12/11/2017   • ENDOSCOPY N/A 2/8/2018   • ENDOSCOPY N/A 7/23/2020   • ENDOSCOPY N/A 9/29/2022    Procedure: ESOPHAGOGASTRODUODENOSCOPY 11:30;  Surgeon: Curtis Rick DO;  Location: [facility] ENDOSCOPY;  Service: Gastroenterology;  Laterality: N/A;                      SLP Adult Swallow Evaluation     Row Name 10/26/22 0925       Rehab Evaluation    Document Type evaluation  -CK    Total Evaluation Minutes, SLP 55  -CK    Subjective Information complains of  throat pain, hoarseness, dysphagia  -CK    Patient Observations alert;cooperative;agree to therapy  -CK    Patient/Family/Caregiver Comments/Observations Pt seen in radiology for MBS  -CK    Patient Effort good  -CK       General Information    Patient Profile Reviewed yes  -CK    Pertinent History Of Current Problem Pt reports  sharp, burning throat pain and choking/coughing w/solids and liquids approximately 2x per meal, as well as, coughing w/o PO intake.  -CK    Current Method of Nutrition regular textures;thin liquids  -CK    Precautions/Limitations, Vision WFL with corrective lenses  -CK    Precautions/Limitations, Hearing WFL  -CK    Prior Level of Function-Communication WFL  -CK    Prior Level of Function-Swallowing no diet consistency restrictions  -CK       Pain    Additional Documentation Pain Scale: Numbers Pre/Post-Treatment (Group)  -CK       Pain Scale: Numbers Pre/Post-Treatment    Pretreatment Pain Rating 3/10  -CK    Posttreatment Pain Rating 3/10  -CK    Pain Location - throat  -CK    Pre/Posttreatment Pain Comment described as burning/sharp pain  -CK       Oral Motor Structure and Function    Dentition Assessment natural, present and adequate  -CK    Secretion Management WNL/WFL  -CK    Mucosal Quality moist, healthy  -CK    Volitional Swallow WFL  -CK    Volitional Cough WFL  -CK       MBS/VFSS    Utensils Used cup  -CK    Consistencies Trialed regular textures;soft textures;pureed;thin liquids  -CK       MBS/VFSS Interpretation    Oral Prep Phase WFL  -CK    Oral Transit Phase impaired  -CK    Oral Residue WFL  -CK       Oral Transit Phase    Impaired Oral Transit Phase premature spillage of liquids into pharynx  -CK       Initiation of Pharyngeal Swallow    Initiation of Pharyngeal Swallow bolus in valleculae;bolus in pyriform sinuses  -CK    Pharyngeal Phase functional pharyngeal phase of swallowing  -CK    Pharyngeal Phase, Comment MBS completed this date.  Pt demonstrated premature spillage w/thin liquids and all solid consistencies.  Premature spillage of the liquids reached the level of the pyriform sinuses; however, no swallow delay noted.  Bolus reached the level of the vallecula w/all solid consistencies w/timely initiation of the swallow.  No aspiration or penetration occured during study.  Cricopharyngeal  bar noted.  Pt able to clear entire bolus w/o residue for all consistencies trialed.  SLP recommends continued current diet of regular solids w/thin liquids.  SLP did recommend strategies of oral prep set and small bites/sips to reduce premature spillage of liquids and solids into the pharynx.  Pt verbalized understanding.  SLP offered to step out of fluro room to educate ; however, pt reported that it was not necessary.  SLP to contact MD w/results and recommendations.  -CK       Esophageal Phase    Esophageal Phase other (see comments)  cricopharyngeal bar noted.  -CK          User Key  (r) = Recorded By, (t) = Taken By, (c) = Cosigned By    Initials Name Provider Type    Jo Reynoso MS CCC-SLP Speech and Language Pathologist                                OP SLP Education     Row Name 10/26/22 1138       Education    Barriers to Learning No barriers identified  -CK    Education Provided Described results of evaluation;Patient expressed understanding of evaluation  -CK    Assessed Learning needs;Learning motivation  -CK    Learning Motivation Strong  -CK    Learning Method Explanation;Other (comment)  reviewed imaging w/pt  -CK    Teaching Response Verbalized understanding  -CK          User Key  (r) = Recorded By, (t) = Taken By, (c) = Cosigned By    Initials Name Effective Dates    Jo Reynoso MS CCC-SLP 06/16/21 -                                    Time Calculation:   Untimed Charges  SLP Eval/Re-eval : ST Motion Fluoro Eval Swallow - 06587  [id number] Motion Fluoro Eval Swallow Minutes: 55  Total Minutes  Untimed Charges Total Minutes: 55   Total Minutes: 55    Therapy Charges for Today     Code Description Service Date Service Provider Modifiers Qty    [id number]  ST MOTION FLUORO EVAL SWALLOW 4 10/26/2022 Jo Maldonado MS CCC-SLP GN 1                  Jo Maldonado MS CCC-SLP  10/26/2022

## 2022-10-27 NOTE — THERAPY EVALUATION
Outpatient Speech Language Pathology   Adult Swallow Initial Evaluation  [facility]     Patient Name: Hallie Milan  : 1940  MRN: [id number]  Today's Date: 10/26/2022         Visit Date: 10/26/2022   Patient Active Problem List   Diagnosis   • GERD (gastroesophageal reflux disease)   • Benign paroxysmal positional vertigo   • Morgagni hernia   • Class 1 obesity due to excess calories with serious comorbidity and body mass index (BMI) of 31.0 to 31.9 in adult   • Benign essential HTN   • PUD (peptic ulcer disease)   • Chest pain on breathing   • Chest pain   • RLL pneumonia   • Atelectasis of right lung   • Acquired hypothyroidism   • B12 deficiency   • Bilateral leg weakness   • Diverticulosis of colon without diverticulitis   • Environmental and seasonal allergies   • MAXIMINO (generalized anxiety disorder)   • History of chest pain   • History of colonic polyps   • History of laparoscopic cholecystectomy   • History of pneumonia   • Mild intermittent asthma without complication   • Mixed hyperlipidemia   • OAB (overactive bladder)   • Gastritis   • Duodenal ulcer   • History of second hand smoke exposure   • Screen for colon cancer   • Mild persistent asthma without complication   • Dyskinesia of esophagus   • Abnormal voice        Past Medical History:   Diagnosis Date   • Allergic rhinitis    • Benign paroxysmal positional vertigo    • Chronic laryngitis    • GERD (gastroesophageal reflux disease)    • Hypertension    • Hypothyroidism    • Nephrolithiasis    • Temporomandibular joint disorder    • Vertigo         Past Surgical History:   Procedure Laterality Date   • CHOLECYSTECTOMY     • COLONOSCOPY N/A 2017   • ENDOSCOPY N/A 2017   • ENDOSCOPY N/A 2018   • ENDOSCOPY N/A 2020   • ENDOSCOPY N/A 2022    Procedure: ESOPHAGOGASTRODUODENOSCOPY 11:30;  Surgeon: Curtis Rick DO;  Location: [facility] ENDOSCOPY;  Service: Gastroenterology;  Laterality: N/A;         Visit Dx:      ICD-10-CM ICD-9-CM   1. Vocal cord paralysis, unilateral partial  J38.01 478.31   2. Muscle tension dysphonia  R49.0 784.42   3. Pharyngeal dysphagia  R13.13 787.23            OP SLP Assessment/Plan - 10/26/22 1005        SLP Assessment    Functional Problems Swallowing;Voice  -EK    Impact on Function: Swallowing Impact on social aspects of eating  -EK    Clinical Impression: Swallowing oropharyngeal phase dysphagia  -EK    Clinical Impression- Voice Mild voice disorder   partial left vocal cord paralysis -EK    Functional Problems Comment SLP will further assess voice next session.  -EK    Clinical Impression Comments SLP will further review MBS results and share results with patient next session and assess voice. SLP will provide swallow and vocal hygiene/strategies next session.  -EK    SLP Diagnosis dysphagia; dysphonia  -EK    Prognosis Good (comment)  -EK    Patient/caregiver participated in establishment of treatment plan and goals Yes  -EK    Patient would benefit from skilled therapy intervention Yes  -EK       SLP Plan    Frequency 1 day every other week  -EK    Duration 4 sessions  -EK    Planned CPT's? SLP SWALLOW THERAPY: 83460;SLP INDIVIDUAL SPEECH THERAPY: 14588  -EK    Plan Comments Next visit: November 9 at 9:30  -EK          User Key  (r) = Recorded By, (t) = Taken By, (c) = Cosigned By    Initials Name Provider Type    EK Pamela Maldonado CCC-SLP Speech and Language Pathologist                 SLP Adult Swallow Evaluation     Row Name 10/26/22 1005       Rehab Evaluation    Document Type evaluation  -EK    Subjective Information complains of  throat burning, sharp pain in throat, and difficulty swallowing  -EK    Patient Observations alert;cooperative;agree to therapy  -EK    Patient/Family/Caregiver Comments/Observations   -EK    Patient Effort good  -EK       General Information    Patient Profile Reviewed yes  -EK    Pertinent History Of Current Problem Pt was seen by   Prabhjot for EGD with laryngeal edeme; then referred to ENT with Dr. Duarte. Pt reports sharp pain with swallowing and throat burning. Pt coughs with solids and liquids. Pt does have L TVF with incomplete paralysis.  -EK    Current Method of Nutrition regular textures;thin liquids  -EK    Precautions/Limitations, Vision WFL with corrective lenses  -EK    Precautions/Limitations, Hearing WFL  -EK    Prior Level of Function-Communication WFL  -EK    Prior Level of Function-Swallowing no diet consistency restrictions  -EK    Plans/Goals Discussed with patient  -EK       Pain    Additional Documentation Pain Scale: Numbers Pre/Post-Treatment (Group)  -EK       Pain Scale: Numbers Pre/Post-Treatment    Pretreatment Pain Rating 3/10  throat pain and burning  -EK    Posttreatment Pain Rating 3/10  -EK       Oral Motor Structure and Function    Dentition Assessment natural, present and adequate  -EK    Secretion Management WNL/WFL  -EK    Mucosal Quality moist, healthy  -EK    Volitional Swallow WFL  -EK    Volitional Cough WFL  -EK       Oral Musculature and Cranial Nerve Assessment    Oral Motor General Assessment WFL  -EK    Oral Motor, Comment Pt reports takes hard candy and water all the time to help decrease cough and pain in throat.  -EK       General Eating/Swallowing Observations    Respiratory Support Currently in Use room air  -EK    Eating/Swallowing Skills self-fed  -EK    Positioning During Eating upright in chair  -EK    Utensils Used cup  -EK    Consistencies Trialed thin liquids  -EK       Respiratory    Respiratory Status WFL;room air  -EK       Clinical Swallow Eval    Oral Prep Phase WFL  -EK    Oral Transit WFL  -EK    Oral Residue WFL  -EK    Pharyngeal Phase no overt signs/symptoms of pharyngeal impairment  -EK       Initiation of Pharyngeal Swallow    Pharyngeal Phase, Comment MBS completed today. SLP will await full results however preliminary results indicate no penetration or aspiration. Pt does  display premature spillage with liquids. Noted cricopharyngeal bar.  -EK       SLP Evaluation Clinical Impression    SLP Swallowing Diagnosis mild;functional pharyngeal phase;oral dysphagia;pharyngeal dysphagia  cricopharyngeal bar noted  -EK    Rehab Potential/Prognosis, Swallowing good, to achieve stated therapy goals  -EK    Swallow Criteria for Skilled Therapeutic Interventions Met demonstrates skilled criteria  -EK       SLP Treatment Clinical Impressions    Treatment Assessment (SLP) oral dysphagia  -EK    Treatment Assessment Comments (SLP) SLP will follow with pt for diet tolerance and compensatory strategies. Pt reports burning and pain in throat. Pt does report coughing and embarassing enough she does not like to eat in public. SLP asked pt to begin monitoring if the coughing is noted with or without liquids or solids.. Concern for reflux and allergies possibly.  -EK    Care Plan Review evaluation/treatment results reviewed  -EK       Recommendations    Therapy Frequency (Swallow) 1 day every other week  -EK    SLP Diet Recommendation thin liquids;regular textures  -EK    Recommended Precautions and Strategies upright posture during/after eating;small bites of food and sips of liquid;multiple swallows per bite of food;alternate between small bites of food and sips of liquid  -EK    Oral Care Recommendations Oral Care BID/PRN  -EK    Monitor for Signs of Aspiration yes;notify SLP if any concerns  -EK       Swallow Goals (SLP)    Diet Tolerance Goal Selection (SLP) Patient will tolerate trials of  -EK       (STG) Patient will tolerate trials of    Consistencies Trialed (Tolerate trials) regular textures;thin liquids  -EK    Desired Outcome (Tolerate trials) with adequate oral prep/transit/clearance;without signs/symptoms of aspiration;with use of compensatory strategies (see comments)  -EK    Progress/Outcomes (Tolerate trials) new goal  -EK          User Key  (r) = Recorded By, (t) = Taken By, (c) =  Cosigned By    Initials Name Provider Type    Pamela Kruse CCC-SLP Speech and Language Pathologist                 SLP SLC Evaluation - 10/26/22 1005        Pain Scale: Numbers Pre/Post-Treatment    Pain Location - throat  -EK          User Key  (r) = Recorded By, (t) = Taken By, (c) = Cosigned By    Initials Name Provider Type    Pamela Kruse CCC-SLP Speech and Language Pathologist                           OP SLP Education     Row Name 10/26/22 1005       Education    Barriers to Learning No barriers identified  -EK    Education Provided Described results of evaluation  -EK    Assessed Learning motivation  -EK    Learning Motivation Strong  -EK    Learning Method Explanation;Demonstration  -EK    Teaching Response Verbalized understanding;Demonstrated understanding  -EK    Education Comments Discussed results of today's evaluation and goals for plan of care.  -EK          User Key  (r) = Recorded By, (t) = Taken By, (c) = Cosigned By    Initials Name Effective Dates    Pamela Kruse CCC-SLP 06/16/21 -                SLP OP Goals     Row Name 10/26/22 1908          SLP Time Calculation    SLP Goal Re-Cert Due Date 11/24/22  -EK           User Key  (r) = Recorded By, (t) = Taken By, (c) = Cosigned By    Initials Name Provider Type    Pamela Kruse CCC-SLP Speech and Language Pathologist                       Time Calculation:   SLP Start Time: 1005  SLP Stop Time: 1049  SLP Time Calculation (min): 44 min  Total Timed Code Minutes- SLP: 44 minute(s)    Therapy Charges for Today     Code Description Service Date Service Provider Modifiers Qty    54510455131 HC ST EVAL ORAL PHARYNG SWALLOW 3 10/26/2022 Pamela Maldonado CCC-SLP GN 1                   CHRISTIANO Saldana  10/26/2022

## 2022-11-09 ENCOUNTER — HOSPITAL ENCOUNTER (OUTPATIENT)
Dept: SPEECH THERAPY | Facility: HOSPITAL | Age: 82
Setting detail: THERAPIES SERIES
Discharge: HOME | End: 2022-11-09

## 2022-11-09 DIAGNOSIS — R13.12 OROPHARYNGEAL DYSPHAGIA: ICD-10-CM

## 2022-11-09 PROCEDURE — 92507 TX SP LANG VOICE COMM INDIV: CPT | Performed by: SPEECH-LANGUAGE PATHOLOGIST

## 2022-11-09 NOTE — THERAPY TREATMENT NOTE
Outpatient Speech Language Pathology   Adult Speech Language Cognitive Treatment Note  [facility]     Patient Name: Hallie Milan  : 1940  MRN: [id number]  Today's Date: 2022         Visit Date: 2022   Patient Active Problem List   Diagnosis   • GERD (gastroesophageal reflux disease)   • Benign paroxysmal positional vertigo   • Morgagni hernia   • Class 1 obesity due to excess calories with serious comorbidity and body mass index (BMI) of 31.0 to 31.9 in adult   • Benign essential HTN   • PUD (peptic ulcer disease)   • Chest pain on breathing   • Chest pain   • RLL pneumonia   • Atelectasis of right lung   • Acquired hypothyroidism   • B12 deficiency   • Bilateral leg weakness   • Diverticulosis of colon without diverticulitis   • Environmental and seasonal allergies   • MAXIMINO (generalized anxiety disorder)   • History of chest pain   • History of colonic polyps   • History of laparoscopic cholecystectomy   • History of pneumonia   • Mild intermittent asthma without complication   • Mixed hyperlipidemia   • OAB (overactive bladder)   • Gastritis   • Duodenal ulcer   • History of second hand smoke exposure   • Screen for colon cancer   • Mild persistent asthma without complication   • Dyskinesia of esophagus   • Abnormal voice          Visit Dx:    ICD-10-CM ICD-9-CM   1. Oropharyngeal dysphagia  R13.12 787.22        OP SLP Assessment/Plan - 22 0929        SLP Assessment    Functional Problems Swallowing  -EK    Clinical Impression: Swallowing oropharyngeal phase dysphagia;Mild:  -EK    Clinical Impression- Voice Mild voice disorder   Pt not as concerned with voice as she is pain/burning in throat -EK    Functional Problems Comment pain, burning in throat,  -EK    Clinical Impression Comments Pt was seen by Dr. Rick for EGD with laryngeal edeme; then referred to ENT with Dr. Duarte. Pt reports sharp pain with swallowing and throat burning. Pt coughs with solids and liquids. Pt does  have L TVF with incomplete paralysis.  -EK    SLP Diagnosis dysphagia; dysphonia  -EK    Prognosis Good (comment)  -EK    Patient/caregiver participated in establishment of treatment plan and goals Yes  -EK    Patient would benefit from skilled therapy intervention Yes  -EK       SLP Plan    Frequency Next session: November 30 at 9:30  -EK    Plan Comments continue plan of care  -EK          User Key  (r) = Recorded By, (t) = Taken By, (c) = Cosigned By    Initials Name Provider Type    EK Pamela Maldonado CCC-SLP Speech and Language Pathologist                   SLP Adult Swallow Evaluation     Row Name 11/09/22 0929       Rehab Evaluation    Document Type therapy note (daily note)  -EK    Subjective Information complains of  -EK    Patient Observations alert;cooperative;agree to therapy  -EK    Patient/Family/Caregiver Comments/Observations   -EK    Patient Effort good  -EK       General Information    Patient Profile Reviewed yes  -EK    Pertinent History Of Current Problem Pt was seen by Dr. Rick for EGD with laryngeal edeme; then referred to ENT with Dr. Duarte. Pt reports sharp pain with swallowing and throat burning. Pt coughs with solids and liquids. Pt does have L TVF with incomplete paralysis.  -EK    Current Method of Nutrition regular textures;thin liquids  -EK    Precautions/Limitations, Vision WFL with corrective lenses  -EK    Precautions/Limitations, Hearing WFL  -EK    Prior Level of Function-Communication WFL  -EK    Prior Level of Function-Swallowing no diet consistency restrictions  -EK    Plans/Goals Discussed with patient  -EK       Pain Scale: Numbers Pre/Post-Treatment    Pretreatment Pain Rating 3/10  throat pain and burning  -EK    Posttreatment Pain Rating 3/10  -EK       Oral Motor Structure and Function    Dentition Assessment natural, present and adequate  -EK    Secretion Management WNL/WFL  -EK    Mucosal Quality moist, healthy  -EK    Volitional Swallow WFL  -EK     Volitional Cough WFL  -EK       Oral Musculature and Cranial Nerve Assessment    Oral Motor General Assessment WFL  -EK       General Eating/Swallowing Observations    Respiratory Support Currently in Use room air  -EK    Eating/Swallowing Skills self-fed  -EK    Positioning During Eating upright in chair  -EK    Utensils Used cup  -EK    Consistencies Trialed thin liquids  -EK       Respiratory    Respiratory Status WFL;room air  -EK       Clinical Swallow Eval    Oral Prep Phase WFL  -EK    Oral Transit WFL  -EK    Oral Residue WFL  -EK    Pharyngeal Phase no overt signs/symptoms of pharyngeal impairment  -EK    Clinical Swallow Evaluation Summary Based on MBS results, pt displays premature spillage therefore SLP edcuated pt on need for oral prep set. Pt able to demonstrate independently during today's visit.  -EK       SLP Evaluation Clinical Impression    SLP Swallowing Diagnosis mild;functional pharyngeal phase;oral dysphagia;pharyngeal dysphagia  cricopharyngeal bar noted  -EK    Rehab Potential/Prognosis, Swallowing good, to achieve stated therapy goals  -EK    Swallow Criteria for Skilled Therapeutic Interventions Met demonstrates skilled criteria  -EK       SLP Treatment Clinical Impressions    Treatment Assessment Comments (SLP) SLP shared MBS results and discussed no aspiration or penetration noted. Pt continues to note coughing, pain and burning in throat. Pt not overly concerned about voice. SLP did not that pt coughed and voice changed/decreased with coughing. SLP provided pt with exercises for muscle tension dysphonia.Pt more concerned about the pain, burning in her throat that triggers the cough. Pt reports she is coughing in the night and coughing up phlegm. SLP discussed LPR and/or allergies with post nasal drip but these do not explain the pain. Pt is concerned about the burning and pain. Pt continues to take Protonix in am, Pepcid at night. She reports she starting taking a zyrtect recently as  well.  -EK    Care Plan Review care plan/treatment goals reviewed  -EK       Recommendations    Therapy Frequency (Swallow) 1 day every other week  -EK    SLP Diet Recommendation thin liquids;regular textures  -EK    Recommended Precautions and Strategies upright posture during/after eating;small bites of food and sips of liquid;multiple swallows per bite of food;alternate between small bites of food and sips of liquid  -EK    Oral Care Recommendations Oral Care BID/PRN  -EK    Monitor for Signs of Aspiration yes;notify SLP if any concerns  -EK       Swallow Goals (SLP)    Diet Tolerance Goal Selection (SLP) Patient will tolerate trials of  -EK       (STG) Patient will tolerate trials of    Consistencies Trialed (Tolerate trials) regular textures;thin liquids  -EK    Desired Outcome (Tolerate trials) with adequate oral prep/transit/clearance;without signs/symptoms of aspiration;with use of compensatory strategies (see comments)  -EK    Progress/Outcomes (Tolerate trials) goal met  -EK    Comment (Tolerate trials) MBS reveals no penetration or aspiration.  -EK          User Key  (r) = Recorded By, (t) = Taken By, (c) = Cosigned By    Initials Name Provider Type    EK Pamela Maldonado CCC-SLP Speech and Language Pathologist              Pt does display sustained phonation an average of 16.3 seconds however the quality is inconsistent. Pt feels the voice will improve when the pain and burning decrease. SLP did provide some basic exercises for muscle tension dysphonia. SLP did try head turn since L TVF has partial paralysis. Head turn did not result in improvement.                  OP SLP Education     Row Name 11/09/22 5443       Education    Barriers to Learning No barriers identified  -EK    Education Provided Patient demonstrated recommended strategies  -EK    Assessed Learning motivation  -EK    Learning Motivation Strong  -EK    Learning Method Explanation;Demonstration  -EK    Teaching Response  Verbalized understanding;Demonstrated understanding  -EK    Education Comments Educated on oral prep set, MBS results, exercises for muscle tension dysphonia.SLP provided handouts of exercises for relaxation and cervical exercises.  -EK          User Key  (r) = Recorded By, (t) = Taken By, (c) = Cosigned By    Initials Name Effective Dates    Pamela Kruse CCC-SLP 06/16/21 -                      Time Calculation:   SLP Start Time: 0929  SLP Stop Time: 1019  SLP Time Calculation (min): 50 min  Total Timed Code Minutes- SLP: 50 minute(s)    Therapy Charges for Today     Code Description Service Date Service Provider Modifiers Qty    31877205688 HC ST TREATMENT SPEECH 3 11/9/2022 Pamela Maldonado CCC-SLP GN 1                   CHRISTIANO Saldana  11/9/2022

## 2022-11-30 ENCOUNTER — HOSPITAL ENCOUNTER (OUTPATIENT)
Dept: SPEECH THERAPY | Facility: HOSPITAL | Age: 82
Setting detail: THERAPIES SERIES
Discharge: HOME | End: 2022-11-30

## 2022-11-30 DIAGNOSIS — R13.10 DYSPHAGIA, UNSPECIFIED TYPE: ICD-10-CM

## 2022-11-30 PROCEDURE — 92526 ORAL FUNCTION THERAPY: CPT | Performed by: SPEECH-LANGUAGE PATHOLOGIST

## 2023-04-14 ENCOUNTER — TRANSCRIBE ORDERS (OUTPATIENT)
Dept: ORTHOPEDIC SURGERY | Facility: CLINIC | Age: 83
End: 2023-04-14
Payer: MEDICARE

## 2023-04-14 DIAGNOSIS — M75.81 RIGHT ROTATOR CUFF TENDINITIS: Primary | ICD-10-CM

## 2023-04-20 ENCOUNTER — OFFICE VISIT (OUTPATIENT)
Dept: ORTHOPEDIC SURGERY | Facility: CLINIC | Age: 83
End: 2023-04-20
Payer: MEDICARE

## 2023-04-20 VITALS — HEIGHT: 63 IN | BODY MASS INDEX: 28.53 KG/M2 | WEIGHT: 161 LBS

## 2023-04-20 DIAGNOSIS — M25.511 CHRONIC RIGHT SHOULDER PAIN: Primary | ICD-10-CM

## 2023-04-20 DIAGNOSIS — M25.619 LIMITED RANGE OF MOTION (ROM) OF SHOULDER: ICD-10-CM

## 2023-04-20 DIAGNOSIS — M75.41 IMPINGEMENT SYNDROME OF RIGHT SHOULDER: ICD-10-CM

## 2023-04-20 DIAGNOSIS — M19.019 AC JOINT ARTHROPATHY: ICD-10-CM

## 2023-04-20 DIAGNOSIS — M75.101 ROTATOR CUFF SYNDROME OF RIGHT SHOULDER: ICD-10-CM

## 2023-04-20 DIAGNOSIS — G89.29 CHRONIC RIGHT SHOULDER PAIN: Primary | ICD-10-CM

## 2023-04-20 RX ORDER — PANTOPRAZOLE SODIUM 20 MG/1
1 TABLET, DELAYED RELEASE ORAL DAILY
COMMUNITY
Start: 2023-02-19

## 2023-04-20 RX ORDER — SOLIFENACIN SUCCINATE 5 MG/1
5 TABLET, FILM COATED ORAL
COMMUNITY
Start: 2023-03-29 | End: 2023-04-20

## 2023-04-20 RX ADMIN — LIDOCAINE HYDROCHLORIDE 2 ML: 10 INJECTION, SOLUTION INFILTRATION; PERINEURAL at 09:24

## 2023-04-20 RX ADMIN — TRIAMCINOLONE ACETONIDE 40 MG: 40 INJECTION, SUSPENSION INTRA-ARTICULAR; INTRAMUSCULAR at 09:24

## 2023-04-20 NOTE — PROGRESS NOTES
Hallie Milan is a 82 y.o. female   Primary provider:  Manolo Galarza MD       Chief Complaint   Patient presents with   • Right Shoulder - Initial Evaluation, Pain     HISTORY OF PRESENT ILLNESS:    History of Present Illness  82-year-old female patient presents to office accompanied by her spouse for evaluation of chronic right shoulder pain that radiates into her right arm.  Patient has experienced intermittent, but chronic, right shoulder pain over the course of several years.  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  It is unclear as to whether or not she had a specific injury to her right shoulder at that time.  In recent months, the patient has experienced progressively worsening right shoulder pain.  She was evaluated by her primary care physician, Dr. Galarza, at [facility] on 3/29/2023 and referred to orthopedics for further evaluation and management.  Notes reviewed indicate that she declined a referral to physical therapy at that time.  Pain is currently described as intermittent and moderate in severity. Pain is described as aching in nature with limited range of motion and weakness.  Patient also reports intermittent pain/numbness/tingling that radiates as far as her right hand/fingers.  Patient also reports that she has a history of chronic neck pain for many years.  Pain is worse with movement/use of her right arm/shoulder.  Pain improves mildly with rest and Tylenol.  X-rays of the right shoulder performed in office today.  Current pain scale is 2/10.    CONCURRENT MEDICAL HISTORY:    Past Medical History:   Diagnosis Date   • Allergic rhinitis    • Benign paroxysmal positional vertigo    • Chronic laryngitis    • GERD (gastroesophageal reflux disease)    • Hypertension    •  Hypothyroidism    • Nephrolithiasis    • Temporomandibular joint disorder    • Vertigo        Allergies   Allergen Reactions   • Codeine Other (See Comments)     oversedation   • Minoxidil Unknown - High Severity     Made her heart feel funny    • Claritin-D 12 Hour [Loratadine-Pseudoephedrine Er] Anxiety and Palpitations   • Prednisone Anxiety and Palpitations         Current Outpatient Medications:   •  albuterol sulfate  (90 Base) MCG/ACT inhaler, INHALE 1 PUFF INTO THE LUNGS 2 TIMES DAILY AS NEEDED, Disp: , Rfl:   •  B COMPLEX VITAMINS PO, Take 1,000 mcg by mouth 1 (One) Time Per Week., Disp: , Rfl:   •  benzonatate (TESSALON) 100 MG capsule, Take 1 capsule by mouth 3 (Three) Times a Day As Needed for Cough., Disp: 30 capsule, Rfl: 0  •  busPIRone (BUSPAR) 10 MG tablet, Take 0.5-1 tablets by mouth 3 (Three) Times a Day., Disp: 90 tablet, Rfl: 0  •  Coenzyme Q10 (COQ-10) 100 MG capsule, Take  by mouth., Disp: , Rfl:   •  levothyroxine (SYNTHROID, LEVOTHROID) 25 MCG tablet, TAKE 1/2 TABLET BY MOUTH EVERY MORNING BEFORE BREAKFAST, Disp: , Rfl:   •  magnesium oxide (MAG-OX) 400 MG tablet, Take 1 tablet by mouth Daily., Disp: , Rfl:   •  meclizine (ANTIVERT) 25 MG tablet, Take 12.5-25 mg by mouth 3 (Three) Times a Day As Needed for dizziness., Disp: , Rfl:   •  nebivolol (BYSTOLIC) 5 MG tablet, Take 1 tablet by mouth Daily., Disp: , Rfl:   •  Omega-3 Fatty Acids (FISH OIL) 500 MG capsule, Take 900 mg by mouth 2 (Two) Times a Day., Disp: , Rfl:   •  pantoprazole (PROTONIX) 20 MG EC tablet, Take 1 tablet by mouth Daily., Disp: , Rfl:   •  rOPINIRole (REQUIP) 0.5 MG tablet, , Disp: , Rfl:   •  rosuvastatin (CRESTOR) 5 MG tablet, Take 1 tablet by mouth Every Evening., Disp: , Rfl:   •  solifenacin (VESICARE) 5 MG tablet, TAKE 1 TABLET (5 MG TOTAL) BY MOUTH DAILY., Disp: , Rfl:   •  sucralfate (CARAFATE) 1 g tablet, Take 1 tablet by mouth Every 8 (Eight) Hours., Disp: , Rfl:   •  ipratropium-albuterol (DUO-NEB)  "0.5-2.5 mg/3 ml nebulizer, Inhale 3 mL., Disp: , Rfl:     Past Surgical History:   Procedure Laterality Date   • CHOLECYSTECTOMY     • COLONOSCOPY N/A 12/11/2017   • ENDOSCOPY N/A 12/11/2017   • ENDOSCOPY N/A 2/8/2018   • ENDOSCOPY N/A 7/23/2020   • ENDOSCOPY N/A 9/29/2022    Procedure: ESOPHAGOGASTRODUODENOSCOPY 11:30;  Surgeon: Curtis Rick DO;  Location: [facility] ENDOSCOPY;  Service: Gastroenterology;  Laterality: N/A;       Family History   Problem Relation Age of Onset   • Hypertension Mother    • Heart disease Father    • Cancer Brother         Social History     Socioeconomic History   • Marital status:    Tobacco Use   • Smoking status: Never   • Smokeless tobacco: Never   Substance and Sexual Activity   • Alcohol use: No   • Drug use: No   • Sexual activity: Not Currently        Review of Systems   Constitutional: Negative.    HENT: Positive for postnasal drip.    Eyes: Positive for visual disturbance.   Respiratory: Positive for cough and wheezing.    Cardiovascular: Negative.    Gastrointestinal: Positive for nausea and vomiting.   Endocrine: Positive for cold intolerance and polydipsia.   Genitourinary: Negative.    Musculoskeletal: Positive for arthralgias, myalgias and neck pain.        Right shoulder/arm pain.   Skin: Negative.    Allergic/Immunologic: Negative.    Neurological: Positive for dizziness.   Hematological: Bruises/bleeds easily.   Psychiatric/Behavioral: Positive for sleep disturbance. The patient is nervous/anxious.        PHYSICAL EXAMINATION:       Ht 158.8 cm (62.5\")   Wt 73 kg (161 lb)   BMI 28.98 kg/m²     Physical Exam  Vitals reviewed.   Constitutional:       General: She is not in acute distress.     Appearance: She is well-developed. She is not ill-appearing.   HENT:      Head: Normocephalic.   Pulmonary:      Effort: Pulmonary effort is normal. No respiratory distress.   Abdominal:      General: There is no distension.      Palpations: Abdomen is soft. "   Musculoskeletal:         General: Tenderness (Mild, right shoulder) present. No swelling, deformity or signs of injury.   Skin:     General: Skin is warm and dry.      Capillary Refill: Capillary refill takes less than 2 seconds.      Findings: No erythema.   Neurological:      Mental Status: She is alert and oriented to person, place, and time.      GCS: GCS eye subscore is 4. GCS verbal subscore is 5. GCS motor subscore is 6.   Psychiatric:         Speech: Speech normal.         Behavior: Behavior normal.         Thought Content: Thought content normal.         Judgment: Judgment normal.         GAIT:     [x]  Normal  []  Antalgic    Assistive device: [x]  None  []  Walker     []  Crutches  []  Cane     []  Wheelchair  []  Stretcher    Right Shoulder Exam     Tenderness   The patient is experiencing tenderness in the acromioclavicular joint and acromion (Mild, diffuse, including the right trapezius muscle).    Range of Motion   Active abduction: 100   External rotation: 80   Forward flexion: 150   Internal rotation 90 degrees: 70     Muscle Strength   Abduction: 4/5   Supraspinatus: 4/5     Tests   Lobo test: positive  Cross arm: positive  Impingement: positive  Drop arm: negative    Other   Erythema: absent  Sensation: normal  Pulse: present    Comments:  Pain and limitations with range of motion.  Tenderness to palpation in multiple areas, including the musculature of the shoulder, right trapezius muscle and right-sided paraspinal cervical muscles.  Empty can test positive.  Full can test positive.  No swelling appreciated.  No signs of infection noted.  No signs of injury noted.  Neurovascularly intact.          XR Shoulder 2+ View Right    Result Date: 4/20/2023  Narrative: INTERNAL-EXTERNAL ROTATION AND TRANSSCAPULAR VIEWS OF THE RIGHT SHOULDER HISTORY: Pain. COMPARISON: None. FINDINGS: Mild to moderate AC joint osteoarthrosis.  Shoulder alignment is anatomic. Normal excursion on internal and external  rotation.  No fracture is seen.  The visualized right upper ribs appear intact.  Soft tissues are unremarkable.     Impression: AC joint osteoarthrosis.  No acute radiographic abnormality at the right shoulder.     ASSESSMENT:    Diagnoses and all orders for this visit:    Chronic right shoulder pain  -     XR Shoulder 2+ View Right  -     Large Joint Arthrocentesis: R subacromial bursa    AC joint arthropathy  -     Large Joint Arthrocentesis: R subacromial bursa    Impingement syndrome of right shoulder  -     Large Joint Arthrocentesis: R subacromial bursa    Rotator cuff syndrome of right shoulder  -     Large Joint Arthrocentesis: R subacromial bursa    Limited range of motion (ROM) of shoulder  -     Large Joint Arthrocentesis: R subacromial bursa    PLAN    Large Joint Arthrocentesis: R subacromial bursa  Date/Time: 4/20/2023 9:24 AM  Consent given by: patient  Timeout: Immediately prior to procedure a time out was called to verify the correct patient, procedure, equipment, support staff and site/side marked as required   Supporting Documentation  Indications: pain and diagnostic evaluation   Procedure Details  Location: shoulder - R subacromial bursa  Preparation: Patient was prepped and draped in the usual sterile fashion  Needle size: 22 G  Approach: posterior  Medications administered: 40 mg triamcinolone acetonide 40 MG/ML; 2 mL lidocaine 1 %  Patient tolerance: patient tolerated the procedure well with no immediate complications      X-rays of the right shoulder performed in office today and reviewed with no acute findings noted.  Patient has moderate degenerative changes noted at the AC joint.  No significant degenerative/osteoarthritic changes are noted at the glenohumeral joint.  Patient has experienced progressively worsening right shoulder pain in recent months for unknown reasons.  She has a history of chronic right shoulder pain that has been more intermittent and milder in severity in the past.   We discussed multiple possibilities as a source of her pain including rotator cuff abnormalities/degenerative tears, tendinitis/tendinosis, bursitis and/impingement.  We discussed that she may need MRI imaging of the right shoulder for further evaluation and more definitive diagnosis.  At this time, the patient wants to wait on MRI imaging and proceed with some conservative care.  Recommend proceeding today with a trial of a subacromial injection of steroid to the right shoulder for management of pain/inflammation.    Patient also has a history of chronic cervical spine pain for many years.  Additionally, she has some pain that radiates into her lower right arm/hand with intermittent numbness/tingling symptoms.  We discussed that the symptoms may be suspicious for cervical radiculopathy, although her exam is more consistent with shoulder pathology.  Physical exam and special testing are indicative of rotator cuff pathology and pain can be reproduced with motion of her shoulder joint and special testing.  However, we discussed that she may have some overlapping symptoms of different conditions and this does not totally exclude the possibility of some involvement of her cervical spine.  However, we discussed that if the localized injection of steroid offers good pain relief, and that helps to confirm her current diagnoses.    Recommend the following:     -Rest and activity modification with avoidance of heavy lifting, pulling, tugging and repetitive motions for now.  -Gentle, progressive range of motion exercises with the affected shoulder intermittently several times daily as pain allows.  -Ice therapy to the affected shoulder intermittently 3-4 times daily for 15 minutes at a time to minimize pain/inflammation.  -Tylenol, Aleve or Ibuprofen as needed for pain/discomfort.  -Gradual progression of activity and use of the affected arm/shoulder as tolerated and based on pain/symptoms.    Follow-up in 2 weeks for  recheck.  If pain persists or worsens, consider MRI imaging of the right shoulder for further evaluation.  If she continues to have any symptoms suggestive of cervical radiculopathy, consider MRI imaging of the cervical spine.  Consider referral to physical therapy.    Time spent of a minimum of 30 minutes including the face to face evaluation, reviewing of medical history and prior medial records, reviewing of diagnostic studies, documentation, patient education and coordination of care.     EMR Dragon/Transciption Disclaimer: Some of this note may be an electronic transcription/translation of spoken language to printed text using the Dragon Dictation System.     Return in about 2 weeks (around 5/4/2023) for Recheck.        This document has been electronically signed by LAW Diane on April 23, 2023 15:40 CDT      LAW Diane

## 2023-04-23 PROBLEM — M25.511 RIGHT SHOULDER PAIN: Status: ACTIVE | Noted: 2023-04-23

## 2023-04-23 PROBLEM — M25.619 LIMITED RANGE OF MOTION (ROM) OF SHOULDER: Status: ACTIVE | Noted: 2023-04-23

## 2023-04-23 PROBLEM — M75.41 IMPINGEMENT SYNDROME OF RIGHT SHOULDER: Status: ACTIVE | Noted: 2023-04-23

## 2023-04-23 PROBLEM — M75.101 ROTATOR CUFF SYNDROME OF RIGHT SHOULDER: Status: ACTIVE | Noted: 2023-04-23

## 2023-04-23 PROBLEM — M19.019 AC JOINT ARTHROPATHY: Status: ACTIVE | Noted: 2023-04-23

## 2023-04-23 RX ORDER — TRIAMCINOLONE ACETONIDE 40 MG/ML
40 INJECTION, SUSPENSION INTRA-ARTICULAR; INTRAMUSCULAR
Status: COMPLETED | OUTPATIENT
Start: 2023-04-20 | End: 2023-04-20

## 2023-04-23 RX ORDER — LIDOCAINE HYDROCHLORIDE 10 MG/ML
2 INJECTION, SOLUTION INFILTRATION; PERINEURAL
Status: COMPLETED | OUTPATIENT
Start: 2023-04-20 | End: 2023-04-20

## 2023-05-05 ENCOUNTER — OFFICE VISIT (OUTPATIENT)
Dept: ORTHOPEDIC SURGERY | Facility: CLINIC | Age: 83
End: 2023-05-05
Payer: MEDICARE

## 2023-05-05 VITALS — WEIGHT: 160 LBS | BODY MASS INDEX: 28.35 KG/M2 | HEIGHT: 63 IN

## 2023-05-05 DIAGNOSIS — G89.29 CHRONIC RIGHT SHOULDER PAIN: Primary | ICD-10-CM

## 2023-05-05 DIAGNOSIS — M75.41 IMPINGEMENT SYNDROME OF RIGHT SHOULDER: ICD-10-CM

## 2023-05-05 DIAGNOSIS — M25.619 LIMITED RANGE OF MOTION (ROM) OF SHOULDER: ICD-10-CM

## 2023-05-05 DIAGNOSIS — M19.019 AC JOINT ARTHROPATHY: ICD-10-CM

## 2023-05-05 DIAGNOSIS — M25.511 CHRONIC RIGHT SHOULDER PAIN: Primary | ICD-10-CM

## 2023-05-05 DIAGNOSIS — M75.101 ROTATOR CUFF SYNDROME OF RIGHT SHOULDER: ICD-10-CM

## 2023-05-05 NOTE — PROGRESS NOTES
"Hallie Milan is a 83 y.o. female returns for     Chief Complaint   Patient presents with   • Right Shoulder - Follow-up, Pain     HISTORY OF PRESENT ILLNESS: Patient presents to office accompanied by her spouse for follow-up of chronic right shoulder pain with pain that radiates into her right upper arm.  Initial onset of pain occurred several years ago and has been intermittent over the course of several years.  However, in recent months, the pain has progressively worsened with associated limited range of motion.  Patient established care with orthopedics on 4/20/2023 and was given a subacromial injection of steroid at that time, which she states did offer some partial pain relief.  Patient continues to complain of persistent right shoulder pain with limited range of motion that limits her daily activities and affects her daily life.  Patient has also tried rest/activity modification and Tylenol.  No new complaints or concerns noted since last office visit.  No falls or injuries reported since last office visit.  Current pain scale is 0/10.     CONCURRENT MEDICAL HISTORY:    The following portions of the patient's history were reviewed and updated as appropriate: allergies, current medications, past family history, past medical history, past social history, past surgical history and problem list.     ROS  No fevers or chills.  No chest pain or shortness of air.  No GI or  disturbances. Right shoulder pain.     PHYSICAL EXAMINATION:       Ht 158.8 cm (62.5\")   Wt 72.6 kg (160 lb)   BMI 28.80 kg/m²     Physical Exam  Vitals reviewed.   Constitutional:       General: She is not in acute distress.     Appearance: She is well-developed. She is not ill-appearing.   HENT:      Head: Normocephalic.   Pulmonary:      Effort: Pulmonary effort is normal. No respiratory distress.   Abdominal:      General: There is no distension.      Palpations: Abdomen is soft.   Musculoskeletal:         General: Tenderness " present. No swelling, deformity or signs of injury.   Skin:     General: Skin is warm and dry.      Capillary Refill: Capillary refill takes less than 2 seconds.      Findings: No erythema.   Neurological:      Mental Status: She is alert and oriented to person, place, and time.      GCS: GCS eye subscore is 4. GCS verbal subscore is 5. GCS motor subscore is 6.   Psychiatric:         Speech: Speech normal.         Behavior: Behavior normal.         Thought Content: Thought content normal.         Judgment: Judgment normal.         GAIT:     [x]  Normal  []  Antalgic    Assistive device: [x]  None  []  Walker     []  Crutches  []  Cane     []  Wheelchair  []  Stretcher    Right Shoulder Exam     Tenderness   The patient is experiencing tenderness in the acromioclavicular joint and acromion (Mild, diffuse, including the right trapezius muscle).    Range of Motion   Active abduction: 100   External rotation: 80   Forward flexion: 150   Internal rotation 90 degrees: 70     Muscle Strength   Abduction: 4/5   Supraspinatus: 4/5     Tests   Lobo test: positive  Cross arm: positive  Impingement: positive  Drop arm: negative    Other   Erythema: absent  Sensation: normal  Pulse: present    Comments:  Pain and limitations with range of motion.  Tenderness to palpation in multiple areas, including the musculature of the shoulder, right trapezius muscle and right-sided paraspinal cervical muscles.  Empty can test positive.  Full can test positive.  No swelling appreciated.  No signs of infection noted.  No signs of injury noted.  Neurovascularly intact.              XR Shoulder 2+ View Right    Result Date: 4/20/2023  Narrative: INTERNAL-EXTERNAL ROTATION AND TRANSSCAPULAR VIEWS OF THE RIGHT SHOULDER HISTORY: Pain. COMPARISON: None. FINDINGS: Mild to moderate AC joint osteoarthrosis.  Shoulder alignment is anatomic. Normal excursion on internal and external rotation.  No fracture is seen.  The visualized right upper ribs  appear intact.  Soft tissues are unremarkable.     Impression: AC joint osteoarthrosis.  No acute radiographic abnormality at the right shoulder.       ASSESSMENT:    Diagnoses and all orders for this visit:    Chronic right shoulder pain  -     MRI Shoulder Right Without Contrast; Future    AC joint arthropathy  -     MRI Shoulder Right Without Contrast; Future    Impingement syndrome of right shoulder  -     MRI Shoulder Right Without Contrast; Future    Rotator cuff syndrome of right shoulder  -     MRI Shoulder Right Without Contrast; Future    Limited range of motion (ROM) of shoulder  -     MRI Shoulder Right Without Contrast; Future    PLAN    Patient continues to complain of persistent/chronic right shoulder pain that has been progressively worsening in recent months with limited range of motion.  Patient was recently given a subacromial injection of steroid into her right shoulder on 4/20/2023, which she states did offer some pain relief but she continues to have limitations in range of motion as well as some persistent pain.  We again discussed multiple possibilities as a source of her pain/symptoms including rotator cuff pathology and/impingement.  We discussed proceeding with advanced imaging for more definitive diagnosis, which will help to guide further treatment recommendations and the patient is agreeable to this.  Recommend MRI of the right shoulder to evaluate for rotator cuff abnormalities/degenerative tears, tendinitis/tendinosis, bursitis and/or impingement.    Recommend the following:      -Rest and activity modification with avoidance of heavy lifting, pulling, tugging and repetitive motions for now.  -Gentle, progressive range of motion exercises with the affected shoulder intermittently several times daily as pain allows.  -Ice therapy to the affected shoulder intermittently 3-4 times daily for 15 minutes at a time to minimize pain/inflammation.  -Tylenol, Aleve or Ibuprofen as needed for  pain/discomfort.  -Gradual progression of activity and use of the affected arm/shoulder as tolerated and based on pain/symptoms.    Follow-up after MRI to discuss results and further treatment recommendations.  Consider surgical consult if indicated.  Consider referral to physical therapy.     Time spent of a minimum of 30 minutes including the face to face evaluation, reviewing of medical history and prior medial records, reviewing of diagnostic studies, ordering additional tests, documentation, patient education and coordination of care.     EMR Dragon/Transciption Disclaimer: Some of this note may be an electronic transcription/translation of spoken language to printed text using the Dragon Dictation System.     Return for follow up after MRI.        This document has been electronically signed by LAW Diane on May 8, 2023 08:30 CDT      LAW Diane

## 2023-05-16 DIAGNOSIS — M25.619 LIMITED RANGE OF MOTION (ROM) OF SHOULDER: ICD-10-CM

## 2023-05-16 DIAGNOSIS — G89.29 CHRONIC RIGHT SHOULDER PAIN: ICD-10-CM

## 2023-05-16 DIAGNOSIS — M75.101 ROTATOR CUFF SYNDROME OF RIGHT SHOULDER: ICD-10-CM

## 2023-05-16 DIAGNOSIS — M25.511 CHRONIC RIGHT SHOULDER PAIN: ICD-10-CM

## 2023-05-16 DIAGNOSIS — M75.41 IMPINGEMENT SYNDROME OF RIGHT SHOULDER: ICD-10-CM

## 2023-05-16 DIAGNOSIS — M19.019 AC JOINT ARTHROPATHY: ICD-10-CM

## 2023-05-17 ENCOUNTER — OFFICE VISIT (OUTPATIENT)
Dept: ORTHOPEDIC SURGERY | Facility: CLINIC | Age: 83
End: 2023-05-17
Payer: MEDICARE

## 2023-05-17 VITALS — WEIGHT: 161.9 LBS | HEIGHT: 63 IN | BODY MASS INDEX: 28.69 KG/M2

## 2023-05-17 DIAGNOSIS — M19.019 AC JOINT ARTHROPATHY: ICD-10-CM

## 2023-05-17 DIAGNOSIS — G89.29 CHRONIC RIGHT SHOULDER PAIN: Primary | ICD-10-CM

## 2023-05-17 DIAGNOSIS — M75.41 IMPINGEMENT SYNDROME OF RIGHT SHOULDER: ICD-10-CM

## 2023-05-17 DIAGNOSIS — M75.111 NONTRAUMATIC INCOMPLETE TEAR OF RIGHT ROTATOR CUFF: ICD-10-CM

## 2023-05-17 DIAGNOSIS — M25.511 CHRONIC RIGHT SHOULDER PAIN: Primary | ICD-10-CM

## 2023-05-17 DIAGNOSIS — M25.619 LIMITED RANGE OF MOTION (ROM) OF SHOULDER: ICD-10-CM

## 2023-05-17 DIAGNOSIS — M75.101 ROTATOR CUFF SYNDROME OF RIGHT SHOULDER: ICD-10-CM

## 2023-05-17 NOTE — PROGRESS NOTES
"Hallie Milan is a 83 y.o. female returns for     Chief Complaint   Patient presents with   • Right Shoulder - Follow-up, Pain     MRI results     HISTORY OF PRESENT ILLNESS: Patient presents to office accompanied by her spouse for follow-up of chronic right shoulder pain with pain that radiates into her right upper arm and to discuss MRI results of the right shoulder.  Initial onset of pain occurred several years ago and has been intermittent over the course of several years.  However, in recent months, the patient has experienced progressively worsening pain with associated limited range of motion.  Patient established care with orthopedics for right shoulder pain on 4/20/2023 and was given a subacromial injection of steroid, which did offer some partial pain relief.  Patient has continued to experience persistent right shoulder pain with limited range of motion that limits her daily activities and affects her daily life.  She has also tried rest/activity modification and Tylenol with intermittent, mild improvement.  She continues to experience increased pain with activity and use of her right arm/shoulder as well as with certain motions/positions with her right shoulder.  No new complaints or concerns noted since last office visit.  No falls or injuries reported since last office visit.  Current pain scale is 0/10 (while at rest).      CONCURRENT MEDICAL HISTORY:    The following portions of the patient's history were reviewed and updated as appropriate: allergies, current medications, past family history, past medical history, past social history, past surgical history and problem list.     ROS  No fevers or chills.  No chest pain or shortness of air.  No GI or  disturbances. Right shoulder pain.     PHYSICAL EXAMINATION:       Ht 158.8 cm (62.5\")   Wt 73.4 kg (161 lb 14.4 oz)   BMI 29.14 kg/m²     Physical Exam  Vitals reviewed.   Constitutional:       General: She is not in acute distress.     " Appearance: She is well-developed. She is not ill-appearing.   HENT:      Head: Normocephalic.   Pulmonary:      Effort: Pulmonary effort is normal. No respiratory distress.   Abdominal:      General: There is no distension.      Palpations: Abdomen is soft.   Musculoskeletal:         General: Tenderness (Mild, right shoulder) present. No swelling, deformity or signs of injury.   Skin:     General: Skin is warm and dry.      Capillary Refill: Capillary refill takes less than 2 seconds.      Findings: No erythema.   Neurological:      Mental Status: She is alert and oriented to person, place, and time.      GCS: GCS eye subscore is 4. GCS verbal subscore is 5. GCS motor subscore is 6.   Psychiatric:         Speech: Speech normal.         Behavior: Behavior normal.         Thought Content: Thought content normal.         Judgment: Judgment normal.         GAIT:     [x]  Normal  []  Antalgic    Assistive device: [x]  None  []  Walker     []  Crutches  []  Cane     []  Wheelchair  []  Stretcher    Right Shoulder Exam     Tenderness   The patient is experiencing tenderness in the acromioclavicular joint and acromion (Mild, diffuse, including the right trapezius muscle).    Range of Motion   Active abduction: 100   External rotation: 80   Forward flexion: 150   Internal rotation 90 degrees: 70     Muscle Strength   Abduction: 4/5   Supraspinatus: 4/5     Tests   Lobo test: positive  Cross arm: positive  Impingement: positive  Drop arm: negative    Other   Erythema: absent  Sensation: normal  Pulse: present    Comments:  Pain and limitations with range of motion.  Tenderness to palpation in multiple areas, including the musculature of the shoulder, right trapezius muscle and right-sided paraspinal cervical muscles.  Empty can test positive.  Full can test positive.  No swelling appreciated.  No signs of infection noted.  No signs of injury noted.  Neurovascularly intact.        XR Shoulder 2+ View Right     Result Date:  4/20/2023  Narrative: INTERNAL-EXTERNAL ROTATION AND TRANSSCAPULAR VIEWS OF THE RIGHT SHOULDER HISTORY: Pain. COMPARISON: None. FINDINGS: Mild to moderate AC joint osteoarthrosis.  Shoulder alignment is anatomic. Normal excursion on internal and external rotation.  No fracture is seen.  The visualized right upper ribs appear intact.  Soft tissues are unremarkable.      Impression: AC joint osteoarthrosis.  No acute radiographic abnormality at the right shoulder.     MRI Shoulder Right Without Contrast  Order: [id number]  Impression        1.  The long head of the biceps tendon is dissected but remains intact.  There is probably a small associated tear along the anterior aspect of the superior labrum.     2.  Extensive but incomplete tear of the supraspinatus tendon as described.     3.  Moderate hypertrophic degenerative change in the AC joint without impingement on the supraspinatus.     [id number]  Narrative    Indication:  Right shoulder pain.     MRI, Right Shoulder without Gadolinium:  No marrow edema or sign of bony injury.  The subscapularis has a normal appearance.     There is an interstitial tear or dissection of the long head of the biceps.  The tendon remains intact and is not avulsed.     There is an extensive but incomplete tear of the supraspinatus tendon with much of the anterior tendon having sustained a full-thickness tear with torn fibers retracted 1.5 cm.  In addition, there is extensive undersurface tearing with torn undersurface   fibers retracted 2.7 cm.  The posterior tendon has a shallow undersurface tear without significantly retracted fibers and this anchors a mildly reduced muscle belly in position.  The infraspinatus tendon has thickening along its anterior margin that may   represent a combination of old injury and tendinosis.  The teres minor is unremarkable.  The glenohumeral joint is in good condition.  There is hypertrophic degenerative change in the AC joint which is of  moderate severity but does not significantly   impinge on the supraspinatus.    Exam End: 05/12/23 12:43    Specimen Collected: 05/12/23 12:45 Last Resulted: 05/12/23 13:03   Received From: [facility]  Result Received: 05/17/23 10:40         ASSESSMENT:    Diagnoses and all orders for this visit:    Chronic right shoulder pain  -     Ambulatory Referral to Physical Therapy Evaluate and treat; (as indicated); Stretching, ROM, Strengthening    AC joint arthropathy  -     Ambulatory Referral to Physical Therapy Evaluate and treat; (as indicated); Stretching, ROM, Strengthening    Impingement syndrome of right shoulder  -     Ambulatory Referral to Physical Therapy Evaluate and treat; (as indicated); Stretching, ROM, Strengthening    Rotator cuff syndrome of right shoulder  -     Ambulatory Referral to Physical Therapy Evaluate and treat; (as indicated); Stretching, ROM, Strengthening    Nontraumatic incomplete tear of right rotator cuff  -     Ambulatory Referral to Physical Therapy Evaluate and treat; (as indicated); Stretching, ROM, Strengthening    Limited range of motion (ROM) of shoulder  -     Ambulatory Referral to Physical Therapy Evaluate and treat; (as indicated); Stretching, ROM, Strengthening    PLAN    MRI of right shoulder reviewed and results are discussed with patient and spouse today.  We discussed that she has evidence of moderate osteoarthritic changes at the AC joint but no significant underlying impingement.  Overall, the radiologist indicates that the glenohumeral joint is in good condition.  We discussed that she has evidence of an extensive, but not complete, tear of the supraspinatus tendon.  Additionally, there is evidence of thickening and tendinosis of the infraspinatus tendon.  Patient has a long history of chronic right shoulder pain that has progressively worsened in the past few months with no known injury or incident.  We discussed that her rotator cuff tear would be a  degenerative tear in light of the fact that she has not sustained any particular injury or trauma to her right shoulder.  We discussed continued conservative management versus surgical consult as the tear is noted to be extensive.  Patient reports that she is not currently interested in surgical intervention and wants to exhaust conservative management.  I have recommended a referral to physical therapy with a focus on improving her range of motion and conditioning/strengthening exercises and the patient is agreeable to this and appears motivated to attend PT.    Recommend the following:      -Rest and activity modification with avoidance of heavy lifting, pulling, tugging and repetitive motions for now.  -Gentle, progressive range of motion exercises with the affected shoulder intermittently several times daily as pain allows.  -Ice therapy to the affected shoulder intermittently 3-4 times daily for 15 minutes at a time to minimize pain/inflammation.  -Tylenol, Aleve or Ibuprofen as needed for pain/discomfort.  -Gradual progression of activity and use of the affected arm/shoulder as tolerated and based on pain/symptoms.    Follow-up in 6 weeks for recheck.     Time spent of a minimum of 20 minutes including the face to face evaluation, reviewing of medical history and prior medial records, reviewing of diagnostic studies, ordering additional treatments, documentation, patient education and coordination of care.     EMR Dragon/Transciption Disclaimer: Some of this note may be an electronic transcription/translation of spoken language to printed text using the Dragon Dictation System.     Return in about 6 weeks (around 6/28/2023) for Recheck.        This document has been electronically signed by LAW Diane on May 17, 2023 13:35 CDT      LAW Diane

## 2023-05-22 ENCOUNTER — HOSPITAL ENCOUNTER (OUTPATIENT)
Dept: PHYSICAL THERAPY | Facility: HOSPITAL | Age: 83
Setting detail: THERAPIES SERIES
Discharge: HOME | End: 2023-05-22
Payer: MEDICARE

## 2023-05-22 DIAGNOSIS — M75.101 ROTATOR CUFF SYNDROME OF RIGHT SHOULDER: ICD-10-CM

## 2023-05-22 DIAGNOSIS — M19.011 ARTHRITIS OF RIGHT ACROMIOCLAVICULAR JOINT: ICD-10-CM

## 2023-05-22 DIAGNOSIS — M75.111 NONTRAUMATIC INCOMPLETE TEAR OF RIGHT ROTATOR CUFF: ICD-10-CM

## 2023-05-22 DIAGNOSIS — M25.511 CHRONIC RIGHT SHOULDER PAIN: ICD-10-CM

## 2023-05-22 DIAGNOSIS — M25.619 LIMITED RANGE OF MOTION (ROM) OF SHOULDER: ICD-10-CM

## 2023-05-22 DIAGNOSIS — G89.29 CHRONIC RIGHT SHOULDER PAIN: ICD-10-CM

## 2023-05-22 DIAGNOSIS — M25.511 ACUTE PAIN OF RIGHT SHOULDER: Primary | ICD-10-CM

## 2023-05-22 DIAGNOSIS — M25.611 DECREASED RANGE OF MOTION OF RIGHT SHOULDER: ICD-10-CM

## 2023-05-22 DIAGNOSIS — M75.41 IMPINGEMENT SYNDROME OF RIGHT SHOULDER: ICD-10-CM

## 2023-05-22 DIAGNOSIS — M19.019 AC JOINT ARTHROPATHY: ICD-10-CM

## 2023-05-22 PROCEDURE — 97161 PT EVAL LOW COMPLEX 20 MIN: CPT

## 2023-05-22 NOTE — THERAPY EVALUATION
Outpatient Physical Therapy Ortho Initial Evaluation  [facility]     Patient Name: Hallie Milan  : 1940  MRN: [id number]  Today's Date: 2023   Visit attendance: 1/1  % Improved: ofelia AIKEN appt: 2023  Recert due date: 23    Therapy diagnosis:R shoulder pain, partial RC tear; AC joint arthropathy       Visit Date: 2023    Patient Active Problem List   Diagnosis   • GERD (gastroesophageal reflux disease)   • Benign paroxysmal positional vertigo   • Morgagni hernia   • Class 1 obesity due to excess calories with serious comorbidity and body mass index (BMI) of 31.0 to 31.9 in adult   • Benign essential HTN   • PUD (peptic ulcer disease)   • Chest pain on breathing   • Chest pain   • RLL pneumonia   • Atelectasis of right lung   • Acquired hypothyroidism   • B12 deficiency   • Bilateral leg weakness   • Diverticulosis of colon without diverticulitis   • Environmental and seasonal allergies   • MAXIMINO (generalized anxiety disorder)   • History of chest pain   • History of colonic polyps   • History of laparoscopic cholecystectomy   • History of pneumonia   • Mild intermittent asthma without complication   • Mixed hyperlipidemia   • OAB (overactive bladder)   • Gastritis   • Duodenal ulcer   • History of second hand smoke exposure   • Screen for colon cancer   • Mild persistent asthma without complication   • Dyskinesia of esophagus   • Abnormal voice   • AC joint arthropathy   • Right shoulder pain   • Impingement syndrome of right shoulder   • Rotator cuff syndrome of right shoulder   • Limited range of motion (ROM) of shoulder   • Nontraumatic incomplete tear of right rotator cuff        Past Medical History:   Diagnosis Date   • Allergic rhinitis    • Benign paroxysmal positional vertigo    • Chronic laryngitis    • GERD (gastroesophageal reflux disease)    • Hypertension    • Hypothyroidism    • Nephrolithiasis    • Temporomandibular joint disorder    • Vertigo         Past  "Surgical History:   Procedure Laterality Date   • CHOLECYSTECTOMY     • COLONOSCOPY N/A 12/11/2017   • ENDOSCOPY N/A 12/11/2017   • ENDOSCOPY N/A 2/8/2018   • ENDOSCOPY N/A 7/23/2020   • ENDOSCOPY N/A 9/29/2022    Procedure: ESOPHAGOGASTRODUODENOSCOPY 11:30;  Surgeon: Curtis Rick DO;  Location: [facility] ENDOSCOPY;  Service: Gastroenterology;  Laterality: N/A;       Visit Dx:     ICD-10-CM ICD-9-CM   1. Acute pain of right shoulder  M25.511 719.41   2. Arthritis of right acromioclavicular joint  M19.011 716.91   3. Nontraumatic incomplete tear of right rotator cuff  M75.111 726.13   4. Impingement syndrome of right shoulder  M75.41 726.2   5. Decreased range of motion of right shoulder  M25.611 719.51          Patient History     Row Name 05/22/23 1108             History    Chief Complaint Muscle weakness;Pain;Difficulty with daily activities;Fatigue/poor endurance;Joint stiffness  -      Type of Pain Shoulder pain  -      Date Current Problem(s) Began 03/22/23  -      Brief Description of Current Complaint R dominant shoulder pain after scrubbing bathtub/shower 2 months ago.  Pt has had increased pain R shoulder since then, reaching out with R dominant arm. Pain after using arm. Pt says R shoulder sometimes \"locks up\" like turning off light switch.She likes to color and play dominos. R shoulder will \"stick\" with pain intermittently.  Pt  says R shoulder pain does wake her some at night. She takes Tylenol some.  Allergy to meds; see chart. Pt feels  she's limited about 25% in R shoulder. Return to see NP in June. Pt going out of town 5/29/23 and won't be back till 6/3/23.  She did have cortisone injection in R shoulder/pt thinks this was in her deltoid m. This has helped.  -      Patient/Caregiver Goals Comment Pt's goals for PT:  reduce pain and return to prior level of function cleaning home, daily activity  -      Hand Dominance right-handed  -      What clinical tests have you had for this " problem? MRI  -      Results of Clinical Tests R AC jt arthropathy, incomplete tear R RC  -MH         Pain     Pain Location Shoulder  -      Pain at Present 2  -      Pain at Best 2  -      Pain at Worst 5  -MH      Is your sleep disturbed? Yes  -      Is medication used to assist with sleep? No  -         Safety    Are you being hurt, hit, or frightened by anyone at home or in your life? No  -MH      Are you being neglected by a caregiver No  -MH      Have you had any of the following issues with Anxiety;Depression  -            User Key  (r) = Recorded By, (t) = Taken By, (c) = Cosigned By    Initials Name Provider Type     Keren Dowling, PT Physical Therapist                 PT Ortho     Row Name 05/22/23 1102       Subjective Comments    Subjective Comments see pt history  -       Precautions and Contraindications    Precautions/Limitations no known precautions/limitations  -       Subjective Pain    Able to rate subjective pain? yes  -MH    Pre-Treatment Pain Level 2  -MH    Post-Treatment Pain Level 2  -MH       Posture/Observations    Posture/Observations Comments rounded shoulders, increased Tsp kyphosis/flexion  -       Special Tests/Palpation    Special Tests/Palpation --  TTP R coracoid/pec minor; upper trap, deltoid  -       Shoulder Impingement/Rotator Cuff Special Tests    Lobo-Kane Test (RC Lesion vs. Bursitis) Right:;Positive  -    Neer Impingement Test (RC Lesion vs. Bursitis) Right:;Negative  -    Empty Can Test (RC Lesion) Right:;Positive  -    Speed's Test (LH of Biceps Lesion) Right:;Negative  -       General ROM    GENERAL ROM COMMENTS L uninvolved AROM: flexion 155; ER 70  -MH       Right Upper Ext    Rt Shoulder Abduction AROM 170  -MH    Rt Shoulder Extension AROM 60  -MH    Rt Shoulder Flexion AROM 150  -MH    Rt Shoulder External Rotation AROM arm to side elbow bent 60  -MH    Rt Shoulder External Rotation PROM 70  -MH    Rt Shoulder Internal  Rotation AROM T10  -MH    Rt Shoulder Internal Rotation PROM 70  -       MMT Right Upper Ext    Rt Shoulder Flexion MMT, Gross Movement (4+/5) good plus  -MH    Rt Shoulder ABduction MMT, Gross Movement (4/5) good  -MH    Rt Shoulder Internal Rotation MMT, Gross Movement (5/5) normal  -MH    Rt Shoulder External Rotation MMT, Gross Movement (4/5) good  -    Rt Upper Extremity Comments  prone R middle and lower trap 3+/5 painful  compared to L 4-/5 without pain  -          User Key  (r) = Recorded By, (t) = Taken By, (c) = Cosigned By    Initials Name Provider Type     Keren Dowling, PT Physical Therapist                            Therapy Education  Education Details: PT ed pt in rationale of PT, role of PT and goals to progress toward per pt request; initiated HEP c pt ed written handout. Pt requires assist to complete with correct technique  Given: HEP, Symptoms/condition management, Posture/body mechanics  Program: New  How Provided: Verbal, Demonstration, Written  Provided to: Patient  Level of Understanding: Verbalized, Demonstrated      PT OP Goals     Row Name 05/22/23 1105          PT Short Term Goals    STG Date to Achieve 06/19/23  -     STG 1 R shoulder AROM flexion at least 160  -     STG 2 R shoulder ER arm to side at least 65  -MH     STG 3 Pt will initiated resisted R shoulder strengthening without increased pain reports  -     STG 4 Worst pain ratings R shoulder no greater than 3/10 to demo clinically measurable pain reduction  -        Long Term Goals    LTG Date to Achieve 07/17/23  -     LTG 1 Quick Dash improved from eval 36.36 to no greater than 26 to demo improvement R shoulder function  -     LTG 2 Negative Lobo Kane impingement sign R shoulder  -     LTG 3 Negative R shoulder empty can test  -     LTG 4 R shoulder ER MMT 5/5  -     LTG 5 Prone R shoulder mid and low trap MMT at least 4-/5  -MH        Time Calculation    PT Goal Re-Cert Due Date 06/12/23  -            User Key  (r) = Recorded By, (t) = Taken By, (c) = Cosigned By    Initials Name Provider Type     Keren Dowling, PT Physical Therapist                 PT Assessment/Plan     Row Name 05/22/23 1108          PT Assessment    Functional Limitations Limitation in home management;Limitations in community activities;Limitations in functional capacity and performance;Performance in self-care ADL  -     Impairments Coordination;Endurance;Joint mobility;Joint integrity;Motor function;Muscle strength;Pain;Range of motion;Posture  -     Assessment Comments PT eval completed wtih pt demo positive empty can test, Lobo Kane test for RC involvement;  Quick dash dysfunction at 36.36 baseline score. Pt demo weakness of posterior shoulder girdle R involved more so than L and decreased pec flexibiltiy noted in posture with rounded shoulders. Pt will benefit from PT having known partial RC tear R shoulder addressing restoring R shoulder flexibility/ROM, Tsp mobility to reduce impingement and RC strength, posterior shoulder girdle strengthening; pain management strategies. Pt agreeable to participate. Eager to improve LOF and achieve PLOF which she has been unable to do.  -     Rehab Potential Good  -     Patient/caregiver participated in establishment of treatment plan and goals Yes  -     Patient would benefit from skilled therapy intervention Yes  -        PT Plan    PT Frequency 2x/week  -     Predicted Duration of Therapy Intervention (PT) 6-8 wks depdending on pt progress  -     Planned CPT's? PT EVAL LOW COMPLEXITY: 16227;PT RE-EVAL: 47629;PT THER PROC EA 15 MIN: 79408;PT THER ACT EA 15 MIN: 38763;PT MANUAL THERAPY EA 15 MIN: 68583;PT NEUROMUSC RE-EDUCATION EA 15 MIN: 09253;PT AQUATIC THERAPY EA 15 MIN: 57101;PT SELF CARE/HOME MGMT/TRAIN EA 15: 45616;PT HOT OR COLD PACK TREAT MCARE;PT ELECTRICAL STIM UNATTEND: ;PT ELECTRICAL STIM ATTD EA 15 MIN: 34258;PT THER SUPP EA 15 MIN  -     PT Plan  Comments Assess pt performance HEP issued 5-22-23 and progress prn; address unmet goals in restoring R shoulder ROM, strength, pain management strategies prn  -           User Key  (r) = Recorded By, (t) = Taken By, (c) = Cosigned By    Initials Name Provider Type    Keren Claire PT Physical Therapist                   OP Exercises     Row Name 05/22/23 5542             Subjective Comments    Subjective Comments see pt history  -         Subjective Pain    Able to rate subjective pain? yes  -      Pre-Treatment Pain Level 2  -      Post-Treatment Pain Level 2  -         Exercise 1    Exercise Name 1 PT teresa/pt education  -      Additional Comments eval only approved  -         Exercise 2    Exercise Name 2 supine clasped hand AROM shoulder flexion  -      Reps 2 10  -MH      Time 2 10 sec  -      Additional Comments HEP  -         Exercise 3    Exercise Name 3 SL R shoulder abduction AROM  -      Reps 3 20  -MH      Additional Comments HEP  -         Exercise 4    Exercise Name 4 doorway pec stretch low hands  -      Reps 4 4  -      Time 4 30 seconds  -      Additional Comments HEP  -            User Key  (r) = Recorded By, (t) = Taken By, (c) = Cosigned By    Initials Name Provider Type    Keren Claire, PT Physical Therapist                              Outcome Measure Options: Quick DASH  Quick DASH  Open a tight or new jar.: Severe Difficulty  Do heavy household chores (e.g., wash walls, wash floors): Severe Difficulty  Carry a shopping bag or briefcase: Moderate Difficulty  Wash your back: Moderate Difficulty  Use a knife to cut food: No Difficulty  Recreational activities in which you take some force or impact through your arm, should or hand (e.g. golf, hammering, tennis, etc.): No Difficulty  During the past week, to what extent has your arm, shoulder, or hand problem interfered with your normal social activites with family, friends, neighbors or groups?: Not at  all  During the past week, were you limited in your work or other regular daily activities as a result of your arm, shoulder or hand problem?: Slightly Limited  Arm, Shoulder, or hand pain: Moderate  Tingling (pins and needles) in your arm, shoulder, or hand: Moderate  During the past week, how much difficulty have you had sleeping because of the pain in your arm, shoulder or hand?: Mild Difficulty  Number of Questions Answered: 11  Quick DASH Score: 36.36         Time Calculation:     Start Time: 1108  Stop Time: 1148  Time Calculation (min): 40 min     Therapy Charges for Today     Code Description Service Date Service Provider Modifiers Qty    [id number] HC PT EVAL LOW COMPLEXITY 3 5/22/2023 Keren Dowling, PT GP 1          PT G-Codes  Outcome Measure Options: Quick DASH  Quick DASH Score: 36.36         Keren Dowling PT  5/22/2023

## 2023-06-10 ENCOUNTER — APPOINTMENT (OUTPATIENT)
Dept: GENERAL RADIOLOGY | Facility: HOSPITAL | Age: 83
End: 2023-06-10
Payer: MEDICARE

## 2023-06-10 ENCOUNTER — HOSPITAL ENCOUNTER (EMERGENCY)
Facility: HOSPITAL | Age: 83
Discharge: HOME | End: 2023-06-10
Attending: FAMILY MEDICINE
Payer: MEDICARE

## 2023-06-10 ENCOUNTER — APPOINTMENT (OUTPATIENT)
Dept: CT IMAGING | Facility: HOSPITAL | Age: 83
End: 2023-06-10
Payer: MEDICARE

## 2023-06-10 VITALS
HEART RATE: 66 BPM | TEMPERATURE: 97.6 F | WEIGHT: 162 LBS | SYSTOLIC BLOOD PRESSURE: 162 MMHG | HEIGHT: 63 IN | BODY MASS INDEX: 28.7 KG/M2 | OXYGEN SATURATION: 100 % | DIASTOLIC BLOOD PRESSURE: 68 MMHG | RESPIRATION RATE: 18 BRPM

## 2023-06-10 DIAGNOSIS — J18.9 COMMUNITY ACQUIRED PNEUMONIA OF RIGHT MIDDLE LOBE OF LUNG: ICD-10-CM

## 2023-06-10 DIAGNOSIS — I10 HTN (HYPERTENSION), BENIGN: Primary | ICD-10-CM

## 2023-06-10 LAB
ALBUMIN SERPL-MCNC: 3.9 G/DL (ref 3.5–5.2)
ALBUMIN/GLOB SERPL: 1.3 G/DL
ALP SERPL-CCNC: 51 U/L (ref 39–117)
ALT SERPL W P-5'-P-CCNC: 18 U/L (ref 1–33)
ANION GAP SERPL CALCULATED.3IONS-SCNC: 12 MMOL/L (ref 5–15)
AST SERPL-CCNC: 19 U/L (ref 1–32)
BASOPHILS # BLD AUTO: 0.04 10*3/MM3 (ref 0–0.2)
BASOPHILS NFR BLD AUTO: 0.7 % (ref 0–1.5)
BILIRUB SERPL-MCNC: 0.5 MG/DL (ref 0–1.2)
BUN SERPL-MCNC: 12 MG/DL (ref 8–23)
BUN/CREAT SERPL: 16.4 (ref 7–25)
CALCIUM SPEC-SCNC: 9.5 MG/DL (ref 8.6–10.5)
CHLORIDE SERPL-SCNC: 102 MMOL/L (ref 98–107)
CO2 SERPL-SCNC: 25 MMOL/L (ref 22–29)
CREAT SERPL-MCNC: 0.73 MG/DL (ref 0.57–1)
DEPRECATED RDW RBC AUTO: 47.9 FL (ref 37–54)
EGFRCR SERPLBLD CKD-EPI 2021: 82.7 ML/MIN/1.73
EOSINOPHIL # BLD AUTO: 0.25 10*3/MM3 (ref 0–0.4)
EOSINOPHIL NFR BLD AUTO: 4.1 % (ref 0.3–6.2)
ERYTHROCYTE [DISTWIDTH] IN BLOOD BY AUTOMATED COUNT: 13.9 % (ref 12.3–15.4)
GEN 5 2HR TROPONIN T REFLEX: 18 NG/L
GLOBULIN UR ELPH-MCNC: 3.1 GM/DL
GLUCOSE SERPL-MCNC: 106 MG/DL (ref 65–99)
HCT VFR BLD AUTO: 35.8 % (ref 34–46.6)
HGB BLD-MCNC: 11.7 G/DL (ref 12–15.9)
HOLD SPECIMEN: NORMAL
HOLD SPECIMEN: NORMAL
IMM GRANULOCYTES # BLD AUTO: 0.01 10*3/MM3 (ref 0–0.05)
IMM GRANULOCYTES NFR BLD AUTO: 0.2 % (ref 0–0.5)
LYMPHOCYTES # BLD AUTO: 1.91 10*3/MM3 (ref 0.7–3.1)
LYMPHOCYTES NFR BLD AUTO: 31.2 % (ref 19.6–45.3)
MCH RBC QN AUTO: 30.7 PG (ref 26.6–33)
MCHC RBC AUTO-ENTMCNC: 32.7 G/DL (ref 31.5–35.7)
MCV RBC AUTO: 94 FL (ref 79–97)
MONOCYTES # BLD AUTO: 0.79 10*3/MM3 (ref 0.1–0.9)
MONOCYTES NFR BLD AUTO: 12.9 % (ref 5–12)
NEUTROPHILS NFR BLD AUTO: 3.13 10*3/MM3 (ref 1.7–7)
NEUTROPHILS NFR BLD AUTO: 50.9 % (ref 42.7–76)
NRBC BLD AUTO-RTO: 0 /100 WBC (ref 0–0.2)
NT-PROBNP SERPL-MCNC: 1312 PG/ML (ref 0–1800)
PLATELET # BLD AUTO: 197 10*3/MM3 (ref 140–450)
PMV BLD AUTO: 10.1 FL (ref 6–12)
POTASSIUM SERPL-SCNC: 3.7 MMOL/L (ref 3.5–5.2)
PROT SERPL-MCNC: 7 G/DL (ref 6–8.5)
QT INTERVAL: 456 MS
QTC INTERVAL: 420 MS
RBC # BLD AUTO: 3.81 10*6/MM3 (ref 3.77–5.28)
SODIUM SERPL-SCNC: 139 MMOL/L (ref 136–145)
TROPONIN T DELTA: 1 NG/L
TROPONIN T SERPL HS-MCNC: 17 NG/L
WBC NRBC COR # BLD: 6.13 10*3/MM3 (ref 3.4–10.8)
WHOLE BLOOD HOLD COAG: NORMAL
WHOLE BLOOD HOLD SPECIMEN: NORMAL

## 2023-06-10 PROCEDURE — 96367 TX/PROPH/DG ADDL SEQ IV INF: CPT

## 2023-06-10 PROCEDURE — 25010000002 AZITHROMYCIN PER 500 MG: Performed by: FAMILY MEDICINE

## 2023-06-10 PROCEDURE — 85025 COMPLETE CBC W/AUTO DIFF WBC: CPT | Performed by: FAMILY MEDICINE

## 2023-06-10 PROCEDURE — 96365 THER/PROPH/DIAG IV INF INIT: CPT

## 2023-06-10 PROCEDURE — 70450 CT HEAD/BRAIN W/O DYE: CPT

## 2023-06-10 PROCEDURE — 93005 ELECTROCARDIOGRAM TRACING: CPT | Performed by: FAMILY MEDICINE

## 2023-06-10 PROCEDURE — 25010000002 HYDRALAZINE PER 20 MG: Performed by: FAMILY MEDICINE

## 2023-06-10 PROCEDURE — 80053 COMPREHEN METABOLIC PANEL: CPT | Performed by: FAMILY MEDICINE

## 2023-06-10 PROCEDURE — 99284 EMERGENCY DEPT VISIT MOD MDM: CPT

## 2023-06-10 PROCEDURE — 84484 ASSAY OF TROPONIN QUANT: CPT | Performed by: FAMILY MEDICINE

## 2023-06-10 PROCEDURE — 25010000002 CEFTRIAXONE PER 250 MG: Performed by: FAMILY MEDICINE

## 2023-06-10 PROCEDURE — 83880 ASSAY OF NATRIURETIC PEPTIDE: CPT | Performed by: FAMILY MEDICINE

## 2023-06-10 PROCEDURE — 36415 COLL VENOUS BLD VENIPUNCTURE: CPT

## 2023-06-10 PROCEDURE — 71045 X-RAY EXAM CHEST 1 VIEW: CPT

## 2023-06-10 PROCEDURE — 96375 TX/PRO/DX INJ NEW DRUG ADDON: CPT

## 2023-06-10 RX ORDER — CEFDINIR 300 MG/1
300 CAPSULE ORAL 2 TIMES DAILY
Qty: 14 CAPSULE | Refills: 0 | Status: SHIPPED | OUTPATIENT
Start: 2023-06-10

## 2023-06-10 RX ORDER — HYDRALAZINE HYDROCHLORIDE 20 MG/ML
10 INJECTION INTRAMUSCULAR; INTRAVENOUS ONCE
Status: COMPLETED | OUTPATIENT
Start: 2023-06-10 | End: 2023-06-10

## 2023-06-10 RX ORDER — SODIUM CHLORIDE 0.9 % (FLUSH) 0.9 %
10 SYRINGE (ML) INJECTION AS NEEDED
Status: DISCONTINUED | OUTPATIENT
Start: 2023-06-10 | End: 2023-06-10 | Stop reason: HOSPADM

## 2023-06-10 RX ORDER — DOXYCYCLINE 100 MG/1
100 CAPSULE ORAL 2 TIMES DAILY
Qty: 14 CAPSULE | Refills: 0 | Status: SHIPPED | OUTPATIENT
Start: 2023-06-10

## 2023-06-10 RX ADMIN — HYDRALAZINE HYDROCHLORIDE 10 MG: 20 INJECTION INTRAMUSCULAR; INTRAVENOUS at 12:35

## 2023-06-10 RX ADMIN — AZITHROMYCIN MONOHYDRATE 500 MG: 500 INJECTION, POWDER, LYOPHILIZED, FOR SOLUTION INTRAVENOUS at 14:34

## 2023-06-10 RX ADMIN — CEFTRIAXONE SODIUM 2 G: 2 INJECTION, POWDER, FOR SOLUTION INTRAMUSCULAR; INTRAVENOUS at 14:05

## 2023-06-10 NOTE — ED PROVIDER NOTES
Subjective   History of Present Illness  Patient is a 83 years old with past medical history of hypertension and vertigo who presented here today because of elevated blood pressure at home.  Denies any headache or shortness of breath or chest pain.  Patient states she usually have low blood pressure.      Review of Systems   All other systems reviewed and are negative.    Past Medical History:   Diagnosis Date    Allergic rhinitis     Benign paroxysmal positional vertigo     Chronic laryngitis     GERD (gastroesophageal reflux disease)     Hypertension     Hypothyroidism     Nephrolithiasis     Temporomandibular joint disorder     Vertigo        Allergies   Allergen Reactions    Codeine Other (See Comments)     oversedation    Minoxidil Unknown - High Severity     Made her heart feel funny     Claritin-D 12 Hour [Loratadine-Pseudoephedrine Er] Anxiety and Palpitations    Prednisone Anxiety and Palpitations       Past Surgical History:   Procedure Laterality Date    CHOLECYSTECTOMY      COLONOSCOPY N/A 12/11/2017    ENDOSCOPY N/A 12/11/2017    ENDOSCOPY N/A 2/8/2018    ENDOSCOPY N/A 7/23/2020    ENDOSCOPY N/A 9/29/2022    Procedure: ESOPHAGOGASTRODUODENOSCOPY 11:30;  Surgeon: Curtis Rick DO;  Location: [facility] ENDOSCOPY;  Service: Gastroenterology;  Laterality: N/A;       Family History   Problem Relation Age of Onset    Hypertension Mother     Heart disease Father     Cancer Brother        Social History     Socioeconomic History    Marital status:    Tobacco Use    Smoking status: Never    Smokeless tobacco: Never   Substance and Sexual Activity    Alcohol use: No    Drug use: No    Sexual activity: Not Currently           Objective   Physical Exam  Vitals and nursing note reviewed.   Constitutional:       Appearance: Normal appearance. She is obese.   HENT:      Head: Normocephalic and atraumatic.      Right Ear: Tympanic membrane, ear canal and external ear normal.      Left Ear: Tympanic membrane,  ear canal and external ear normal.      Nose: Nose normal.   Eyes:      Extraocular Movements: Extraocular movements intact.      Conjunctiva/sclera: Conjunctivae normal.      Pupils: Pupils are equal, round, and reactive to light.   Cardiovascular:      Rate and Rhythm: Normal rate and regular rhythm.      Pulses: Normal pulses.      Heart sounds: Normal heart sounds.   Pulmonary:      Effort: Pulmonary effort is normal.      Breath sounds: Normal breath sounds.   Abdominal:      General: Abdomen is flat. Bowel sounds are normal.      Palpations: Abdomen is soft.   Musculoskeletal:         General: Normal range of motion.      Cervical back: Normal range of motion and neck supple.   Skin:     General: Skin is warm.      Capillary Refill: Capillary refill takes less than 2 seconds.   Neurological:      General: No focal deficit present.      Mental Status: She is alert and oriented to person, place, and time.   Psychiatric:         Mood and Affect: Mood normal.         Behavior: Behavior normal.       ECG 12 Lead      Date/Time: 6/10/2023 12:31 PM  Performed by: Simone Whitney MD  Authorized by: Simone Whitney MD   Interpreted by physician  Rhythm: sinus bradycardia  Rate: bradycardic  BPM: 51  QRS axis: left  Conduction: conduction normal  ST Segments: ST segments normal  T Waves: T waves normal  Other: no other findings  Other findings: LVH  Clinical impression: abnormal ECG             ED Course      Labs Reviewed   TROPONIN - Abnormal; Notable for the following components:       Result Value    HS Troponin T 17 (*)     All other components within normal limits    Narrative:     High Sensitive Troponin T Reference Range:  <10.0 ng/L- Negative Female for AMI  <15.0 ng/L- Negative Male for AMI  >=10 - Abnormal Female indicating possible myocardial injury.  >=15 - Abnormal Male indicating possible myocardial injury.   Clinicians would have to utilize clinical acumen, EKG, Troponin, and serial changes  to determine if it is an Acute Myocardial Infarction or myocardial injury due to an underlying chronic condition.        COMPREHENSIVE METABOLIC PANEL - Abnormal; Notable for the following components:    Glucose 106 (*)     All other components within normal limits    Narrative:     GFR Normal >60  Chronic Kidney Disease <60  Kidney Failure <15    The GFR formula is only valid for adults with stable renal function between ages 18 and 70.   CBC WITH AUTO DIFFERENTIAL - Abnormal; Notable for the following components:    Hemoglobin 11.7 (*)     Monocyte % 12.9 (*)     All other components within normal limits   HIGH SENSITIVITIY TROPONIN T 2HR - Abnormal; Notable for the following components:    HS Troponin T 18 (*)     All other components within normal limits    Narrative:     High Sensitive Troponin T Reference Range:  <10.0 ng/L- Negative Female for AMI  <15.0 ng/L- Negative Male for AMI  >=10 - Abnormal Female indicating possible myocardial injury.  >=15 - Abnormal Male indicating possible myocardial injury.   Clinicians would have to utilize clinical acumen, EKG, Troponin, and serial changes to determine if it is an Acute Myocardial Infarction or myocardial injury due to an underlying chronic condition.        BNP (IN-HOUSE) - Normal    Narrative:     Among patients with dyspnea, NT-proBNP is highly sensitive for the detection of acute congestive heart failure. In addition NT-proBNP of <300 pg/ml effectively rules out acute congestive heart failure with 99% negative predictive value.     RAINBOW DRAW    Narrative:     The following orders were created for panel order [place] Draw.  Procedure                               Abnormality         Status                     ---------                               -----------         ------                     Green Top (Gel)[[id number]]                                  Final result               Lavender Top[[id number]]                                     Final result                Gold Top - SST[[id number]]                                   Final result               Light Blue Top[[id number]]                                   Final result                 Please view results for these tests on the individual orders.   CBC AND DIFFERENTIAL    Narrative:     The following orders were created for panel order CBC & Differential.  Procedure                               Abnormality         Status                     ---------                               -----------         ------                     CBC Auto Differential[[id number]]        Abnormal            Final result                 Please view results for these tests on the individual orders.   GREEN TOP   LAVENDER TOP   GOLD TOP - SST   LIGHT BLUE TOP       XR Chest 1 View   Final Result      CT Head Without Contrast   Final Result   No acute intracranial process.                                               Medical Decision Making  Patient is a 83 years old who presented here today because of elevated blood pressure.  Patient is occasionally have dizziness but has history of vertigo.  Patient also said that she has been coughing off and on for more now.  Chest x-ray was done which showed as possible pneumonia.  Patient was given Rocephin and Zithromax in the hospital.  Patient was discharged with some antibiotic to finish his about 7 days.  Follow-up with primary care in 3 days.  Come back if symptoms get worse.  Patient also given hydralazine for blood pressure and the blood pressure improved.    Amount and/or Complexity of Data Reviewed  Labs: ordered.  Radiology: ordered.  ECG/medicine tests: ordered and independent interpretation performed.    Risk  Prescription drug management.        Final diagnoses:   HTN (hypertension), benign   Community acquired pneumonia of right middle lobe of lung       ED Disposition  ED Disposition       ED Disposition   Discharge    Condition   Stable    Comment   --               Manolo Galarza  MD ADRIANE  [address] [place] [address]  [phone]    In 3 days           Medication List        New Prescriptions      cefdinir 300 MG capsule  Commonly known as: OMNICEF  Take 1 capsule by mouth 2 (Two) Times a Day.     doxycycline 100 MG capsule  Commonly known as: MONODOX  Take 1 capsule by mouth 2 (Two) Times a Day.               Where to Get Your Medications        These medications were sent to [facility]/pharm[facility] #[id number] - [place], [place] - [address] [place] [address] - [phone]  - [phone] [place] [address]      Phone: [phone]   cefdinir 300 MG capsule  doxycycline 100 MG capsule            Simone Whitney MD  06/10/23 1814       Simone Whitney MD  06/10/23 1819

## 2023-06-13 LAB
QT INTERVAL: 456 MS
QTC INTERVAL: 420 MS

## 2023-06-30 PROBLEM — M54.42 CHRONIC LEFT-SIDED LOW BACK PAIN WITH LEFT-SIDED SCIATICA: Status: ACTIVE | Noted: 2023-06-30

## 2023-06-30 PROBLEM — M25.552 LEFT HIP PAIN: Status: ACTIVE | Noted: 2023-06-30

## 2023-06-30 PROBLEM — M79.605 PAIN OF LEFT LOWER EXTREMITY: Status: ACTIVE | Noted: 2023-06-30

## 2023-06-30 PROBLEM — G89.29 CHRONIC LEFT-SIDED LOW BACK PAIN WITH LEFT-SIDED SCIATICA: Status: ACTIVE | Noted: 2023-06-30

## 2023-07-05 LAB
QT INTERVAL: 456 MS
QTC INTERVAL: 420 MS

## 2023-07-29 ENCOUNTER — HOSPITAL ENCOUNTER (EMERGENCY)
Facility: HOSPITAL | Age: 83
Discharge: HOME | End: 2023-07-29
Attending: STUDENT IN AN ORGANIZED HEALTH CARE EDUCATION/TRAINING PROGRAM
Payer: MEDICARE

## 2023-07-29 ENCOUNTER — APPOINTMENT (OUTPATIENT)
Dept: CT IMAGING | Facility: HOSPITAL | Age: 83
End: 2023-07-29
Payer: MEDICARE

## 2023-07-29 VITALS
RESPIRATION RATE: 20 BRPM | BODY MASS INDEX: 28.53 KG/M2 | HEART RATE: 83 BPM | DIASTOLIC BLOOD PRESSURE: 67 MMHG | TEMPERATURE: 99 F | OXYGEN SATURATION: 99 % | SYSTOLIC BLOOD PRESSURE: 155 MMHG | HEIGHT: 63 IN | WEIGHT: 161 LBS

## 2023-07-29 DIAGNOSIS — U07.1 COVID: Primary | ICD-10-CM

## 2023-07-29 LAB
FLUAV SUBTYP SPEC NAA+PROBE: NOT DETECTED
FLUBV RNA ISLT QL NAA+PROBE: NOT DETECTED
HOLD SPECIMEN: NORMAL
HOLD SPECIMEN: NORMAL
SARS-COV-2 RNA RESP QL NAA+PROBE: DETECTED
WHOLE BLOOD HOLD COAG: NORMAL
WHOLE BLOOD HOLD SPECIMEN: NORMAL

## 2023-07-29 PROCEDURE — 99284 EMERGENCY DEPT VISIT MOD MDM: CPT

## 2023-07-29 PROCEDURE — 87636 SARSCOV2 & INF A&B AMP PRB: CPT | Performed by: STUDENT IN AN ORGANIZED HEALTH CARE EDUCATION/TRAINING PROGRAM

## 2023-07-29 PROCEDURE — 96374 THER/PROPH/DIAG INJ IV PUSH: CPT

## 2023-07-29 PROCEDURE — 70450 CT HEAD/BRAIN W/O DYE: CPT

## 2023-07-29 PROCEDURE — 93010 ELECTROCARDIOGRAM REPORT: CPT | Performed by: INTERNAL MEDICINE

## 2023-07-29 PROCEDURE — 25010000002 HYDRALAZINE PER 20 MG: Performed by: STUDENT IN AN ORGANIZED HEALTH CARE EDUCATION/TRAINING PROGRAM

## 2023-07-29 PROCEDURE — 93005 ELECTROCARDIOGRAM TRACING: CPT | Performed by: STUDENT IN AN ORGANIZED HEALTH CARE EDUCATION/TRAINING PROGRAM

## 2023-07-29 RX ORDER — SODIUM CHLORIDE 0.9 % (FLUSH) 0.9 %
10 SYRINGE (ML) INJECTION AS NEEDED
Status: DISCONTINUED | OUTPATIENT
Start: 2023-07-29 | End: 2023-07-29 | Stop reason: HOSPADM

## 2023-07-29 RX ORDER — HYDRALAZINE HYDROCHLORIDE 20 MG/ML
20 INJECTION INTRAMUSCULAR; INTRAVENOUS ONCE
Status: COMPLETED | OUTPATIENT
Start: 2023-07-29 | End: 2023-07-29

## 2023-07-29 RX ADMIN — HYDRALAZINE HYDROCHLORIDE 20 MG: 20 INJECTION INTRAMUSCULAR; INTRAVENOUS at 13:55

## 2023-07-29 NOTE — ED PROVIDER NOTES
Subjective   History of Present Illness  83-year-old female comes to the ER with a 1 day history of headache, body aches, cough and shortness of breath.  She reports feeling fatigued.  Her  is in the hospital and has the coronavirus.  Patient was sent down to the ER for testing after her symptoms started.  She denies other symptoms    History provided by:  Patient   used: No      Review of Systems   Constitutional:  Positive for activity change and fatigue. Negative for chills and fever.   HENT:  Negative for drooling.    Eyes:  Negative for redness.   Respiratory:  Positive for cough and shortness of breath. Negative for apnea and wheezing.    Cardiovascular:  Negative for chest pain and palpitations.   Gastrointestinal:  Negative for abdominal pain, constipation, diarrhea, nausea and vomiting.   Genitourinary:  Negative for flank pain.   Musculoskeletal:  Positive for arthralgias.   Skin:  Negative for color change.   Neurological:  Positive for headaches. Negative for dizziness, seizures, weakness, light-headedness and numbness.   Psychiatric/Behavioral:  Negative for confusion.      Past Medical History:   Diagnosis Date    Allergic rhinitis     Benign paroxysmal positional vertigo     Chronic laryngitis     GERD (gastroesophageal reflux disease)     Hypertension     Hypothyroidism     Nephrolithiasis     Temporomandibular joint disorder     Vertigo        Allergies   Allergen Reactions    Codeine Other (See Comments)     oversedation    Minoxidil Unknown - High Severity     Made her heart feel funny     Claritin-D 12 Hour [Loratadine-Pseudoephedrine Er] Anxiety and Palpitations    Prednisone Anxiety and Palpitations       Past Surgical History:   Procedure Laterality Date    CHOLECYSTECTOMY      COLONOSCOPY N/A 12/11/2017    ENDOSCOPY N/A 12/11/2017    ENDOSCOPY N/A 2/8/2018    ENDOSCOPY N/A 7/23/2020    ENDOSCOPY N/A 9/29/2022    Procedure: ESOPHAGOGASTRODUODENOSCOPY 11:30;  Surgeon:  "Curtis Rick DO;  Location: [facility] ENDOSCOPY;  Service: Gastroenterology;  Laterality: N/A;       Family History   Problem Relation Age of Onset    Hypertension Mother     Heart disease Father     Cancer Brother        Social History     Socioeconomic History    Marital status:    Tobacco Use    Smoking status: Never    Smokeless tobacco: Never   Substance and Sexual Activity    Alcohol use: No    Drug use: No    Sexual activity: Not Currently           Objective   Vitals:    07/29/23 1235 07/29/23 1259 07/29/23 1354 07/29/23 1425   BP: (!) 203/73 180/77 (!) 186/75 155/67   BP Location: Right arm Left arm  Left arm   Patient Position: Sitting Lying  Lying   Pulse: 76 74 68 83   Resp: 20 20 19 20   Temp: 99 °F (37.2 °C)      TempSrc: Oral      SpO2: 96% 96% 98% 99%   Weight: 73 kg (161 lb)      Height: 160 cm (63\")        Physical Exam  Vitals and nursing note reviewed.   Constitutional:       General: She is not in acute distress.     Appearance: She is well-developed. She is not ill-appearing, toxic-appearing or diaphoretic.   Eyes:      General: No scleral icterus.     Conjunctiva/sclera: Conjunctivae normal.   Pulmonary:      Effort: Pulmonary effort is normal. No accessory muscle usage or respiratory distress.   Chest:      Chest wall: No tenderness.   Abdominal:      Palpations: Abdomen is soft.      Tenderness: There is no abdominal tenderness (deep palpation). There is no guarding or rebound.   Musculoskeletal:         General: Normal range of motion.   Skin:     General: Skin is warm and dry.      Capillary Refill: Capillary refill takes less than 2 seconds.   Neurological:      Mental Status: She is alert and oriented to person, place, and time.       Procedures           ED Course      CT Head Without Contrast   Final Result   No acute intracranial process.                                      Medical Decision Making  Vital signs are stable, afebrile.  Imaging negative for acute findings.  " Blood pressure improved with hydralazine.  Patient is COVID-positive.  Symptomatic care discussed.  Return precautions given.  Recommend PCP follow-up.    Problems Addressed:  COVID: complicated acute illness or injury    Amount and/or Complexity of Data Reviewed  Radiology: ordered.  ECG/medicine tests: ordered.    Risk  Prescription drug management.        Final diagnoses:   COVID       ED Disposition  ED Disposition       ED Disposition   Discharge    Condition   Stable    Comment   --               Manolo Galarza MD  [address] [place] [address]  [phone]    Schedule an appointment as soon as possible for a visit in 2 days  As needed, ER follow up         Medication List      No changes were made to your prescriptions during this visit.            Ramon Paz MD  07/29/23 9223

## 2023-07-30 LAB
QT INTERVAL: 374 MS
QTC INTERVAL: 423 MS

## (undated) DEVICE — SINGLE-USE BIOPSY FORCEPS: Brand: RADIAL JAW 4

## (undated) DEVICE — BITEBLOCK ENDO W/STRAP 60F A/ LF DISP

## (undated) DEVICE — CANN SMPL SOFTECH BIFLO ETCO2 A/M 7FT

## (undated) DEVICE — MSK ENDO PORT O2 POM ELITE CURAPLEX A/